# Patient Record
Sex: FEMALE | Race: WHITE | NOT HISPANIC OR LATINO | Employment: UNEMPLOYED | URBAN - METROPOLITAN AREA
[De-identification: names, ages, dates, MRNs, and addresses within clinical notes are randomized per-mention and may not be internally consistent; named-entity substitution may affect disease eponyms.]

---

## 2017-04-21 ENCOUNTER — APPOINTMENT (EMERGENCY)
Dept: RADIOLOGY | Facility: HOSPITAL | Age: 37
End: 2017-04-21
Payer: COMMERCIAL

## 2017-04-21 ENCOUNTER — APPOINTMENT (EMERGENCY)
Dept: CT IMAGING | Facility: HOSPITAL | Age: 37
End: 2017-04-21
Payer: COMMERCIAL

## 2017-04-21 ENCOUNTER — HOSPITAL ENCOUNTER (OUTPATIENT)
Facility: HOSPITAL | Age: 37
Setting detail: OBSERVATION
Discharge: HOME | End: 2017-04-22
Attending: INTERNAL MEDICINE | Admitting: INTERNAL MEDICINE
Payer: COMMERCIAL

## 2017-04-21 DIAGNOSIS — J45.909 ASTHMA: ICD-10-CM

## 2017-04-21 DIAGNOSIS — R10.9 ABDOMINAL PAIN: ICD-10-CM

## 2017-04-21 DIAGNOSIS — R07.89 ATYPICAL CHEST PAIN: ICD-10-CM

## 2017-04-21 DIAGNOSIS — I10 HYPERTENSION: ICD-10-CM

## 2017-04-21 DIAGNOSIS — R11.2 INTRACTABLE NAUSEA AND VOMITING: Primary | ICD-10-CM

## 2017-04-21 PROBLEM — I16.0 HYPERTENSIVE URGENCY: Status: ACTIVE | Noted: 2017-04-21

## 2017-04-21 LAB
ALBUMIN SERPL BCP-MCNC: 4.2 G/DL (ref 3.5–5)
ALP SERPL-CCNC: 49 U/L (ref 46–116)
ALT SERPL W P-5'-P-CCNC: 31 U/L (ref 12–78)
ANION GAP SERPL CALCULATED.3IONS-SCNC: 10 MMOL/L (ref 4–13)
AST SERPL W P-5'-P-CCNC: 20 U/L (ref 5–45)
ATRIAL RATE: 60 BPM
BACTERIA UR QL AUTO: ABNORMAL /HPF
BASOPHILS # BLD AUTO: 0.03 THOUSANDS/ΜL (ref 0–0.1)
BASOPHILS NFR BLD AUTO: 0 % (ref 0–1)
BILIRUB SERPL-MCNC: 0.69 MG/DL (ref 0.2–1)
BILIRUB UR QL STRIP: NEGATIVE
BUN SERPL-MCNC: 17 MG/DL (ref 5–25)
CALCIUM SERPL-MCNC: 8.9 MG/DL (ref 8.3–10.1)
CHLORIDE SERPL-SCNC: 104 MMOL/L (ref 100–108)
CLARITY UR: CLEAR
CO2 SERPL-SCNC: 27 MMOL/L (ref 21–32)
COLOR UR: YELLOW
CREAT SERPL-MCNC: 0.87 MG/DL (ref 0.6–1.3)
EOSINOPHIL # BLD AUTO: 0.07 THOUSAND/ΜL (ref 0–0.61)
EOSINOPHIL NFR BLD AUTO: 1 % (ref 0–6)
ERYTHROCYTE [DISTWIDTH] IN BLOOD BY AUTOMATED COUNT: 12.5 % (ref 11.6–15.1)
GFR SERPL CREATININE-BSD FRML MDRD: >60 ML/MIN/1.73SQ M
GLUCOSE SERPL-MCNC: 105 MG/DL (ref 65–140)
GLUCOSE UR STRIP-MCNC: NEGATIVE MG/DL
HCG UR QL: NEGATIVE
HCT VFR BLD AUTO: 42.2 % (ref 34.8–46.1)
HGB BLD-MCNC: 15 G/DL (ref 11.5–15.4)
HGB UR QL STRIP.AUTO: ABNORMAL
KETONES UR STRIP-MCNC: NEGATIVE MG/DL
LEUKOCYTE ESTERASE UR QL STRIP: NEGATIVE
LIPASE SERPL-CCNC: 295 U/L (ref 73–393)
LYMPHOCYTES # BLD AUTO: 3.33 THOUSANDS/ΜL (ref 0.6–4.47)
LYMPHOCYTES NFR BLD AUTO: 32 % (ref 14–44)
MCH RBC QN AUTO: 30.2 PG (ref 26.8–34.3)
MCHC RBC AUTO-ENTMCNC: 35.5 G/DL (ref 31.4–37.4)
MCV RBC AUTO: 85 FL (ref 82–98)
MONOCYTES # BLD AUTO: 0.45 THOUSAND/ΜL (ref 0.17–1.22)
MONOCYTES NFR BLD AUTO: 4 % (ref 4–12)
NEUTROPHILS # BLD AUTO: 6.63 THOUSANDS/ΜL (ref 1.85–7.62)
NEUTS SEG NFR BLD AUTO: 63 % (ref 43–75)
NITRITE UR QL STRIP: NEGATIVE
NON-SQ EPI CELLS URNS QL MICRO: ABNORMAL /HPF
P AXIS: 85 DEGREES
PH UR STRIP.AUTO: 7 [PH] (ref 4.5–8)
PLATELET # BLD AUTO: 261 THOUSANDS/UL (ref 149–390)
PMV BLD AUTO: 11.2 FL (ref 8.9–12.7)
POTASSIUM SERPL-SCNC: 3.5 MMOL/L (ref 3.5–5.3)
PR INTERVAL: 144 MS
PROT SERPL-MCNC: 7.5 G/DL (ref 6.4–8.2)
PROT UR STRIP-MCNC: NEGATIVE MG/DL
QRS AXIS: 77 DEGREES
QRSD INTERVAL: 74 MS
QT INTERVAL: 418 MS
QTC INTERVAL: 418 MS
RBC # BLD AUTO: 4.97 MILLION/UL (ref 3.81–5.12)
RBC #/AREA URNS AUTO: ABNORMAL /HPF
SODIUM SERPL-SCNC: 141 MMOL/L (ref 136–145)
SP GR UR STRIP.AUTO: 1.01 (ref 1–1.03)
SPECIMEN SOURCE: NORMAL
SPECIMEN SOURCE: NORMAL
T WAVE AXIS: 65 DEGREES
TROPONIN I BLD-MCNC: 0 NG/ML (ref 0–0.08)
TROPONIN I BLD-MCNC: 0 NG/ML (ref 0–0.08)
TSH SERPL DL<=0.05 MIU/L-ACNC: 1.36 UIU/ML (ref 0.36–3.74)
UROBILINOGEN UR QL STRIP.AUTO: 0.2 E.U./DL
VENTRICULAR RATE: 60 BPM
WBC # BLD AUTO: 10.51 THOUSAND/UL (ref 4.31–10.16)
WBC #/AREA URNS AUTO: ABNORMAL /HPF

## 2017-04-21 PROCEDURE — 96361 HYDRATE IV INFUSION ADD-ON: CPT

## 2017-04-21 PROCEDURE — 80053 COMPREHEN METABOLIC PANEL: CPT | Performed by: PHYSICIAN ASSISTANT

## 2017-04-21 PROCEDURE — 81025 URINE PREGNANCY TEST: CPT | Performed by: PHYSICIAN ASSISTANT

## 2017-04-21 PROCEDURE — 84443 ASSAY THYROID STIM HORMONE: CPT | Performed by: PHYSICIAN ASSISTANT

## 2017-04-21 PROCEDURE — 96376 TX/PRO/DX INJ SAME DRUG ADON: CPT

## 2017-04-21 PROCEDURE — 99285 EMERGENCY DEPT VISIT HI MDM: CPT

## 2017-04-21 PROCEDURE — 81002 URINALYSIS NONAUTO W/O SCOPE: CPT | Performed by: PHYSICIAN ASSISTANT

## 2017-04-21 PROCEDURE — 93005 ELECTROCARDIOGRAM TRACING: CPT | Performed by: PHYSICIAN ASSISTANT

## 2017-04-21 PROCEDURE — 83690 ASSAY OF LIPASE: CPT | Performed by: PHYSICIAN ASSISTANT

## 2017-04-21 PROCEDURE — 96375 TX/PRO/DX INJ NEW DRUG ADDON: CPT

## 2017-04-21 PROCEDURE — 36415 COLL VENOUS BLD VENIPUNCTURE: CPT | Performed by: PHYSICIAN ASSISTANT

## 2017-04-21 PROCEDURE — 85025 COMPLETE CBC W/AUTO DIFF WBC: CPT | Performed by: PHYSICIAN ASSISTANT

## 2017-04-21 PROCEDURE — 84484 ASSAY OF TROPONIN QUANT: CPT

## 2017-04-21 PROCEDURE — 74177 CT ABD & PELVIS W/CONTRAST: CPT

## 2017-04-21 PROCEDURE — 71020 HB CHEST X-RAY 2VW FRONTAL&LATL: CPT

## 2017-04-21 PROCEDURE — 96374 THER/PROPH/DIAG INJ IV PUSH: CPT

## 2017-04-21 PROCEDURE — 87086 URINE CULTURE/COLONY COUNT: CPT

## 2017-04-21 PROCEDURE — 81001 URINALYSIS AUTO W/SCOPE: CPT

## 2017-04-21 PROCEDURE — 94640 AIRWAY INHALATION TREATMENT: CPT

## 2017-04-21 RX ORDER — IPRATROPIUM BROMIDE AND ALBUTEROL SULFATE 2.5; .5 MG/3ML; MG/3ML
3 SOLUTION RESPIRATORY (INHALATION)
Status: DISCONTINUED | OUTPATIENT
Start: 2017-04-21 | End: 2017-04-21

## 2017-04-21 RX ORDER — NICOTINE 21 MG/24HR
1 PATCH, TRANSDERMAL 24 HOURS TRANSDERMAL DAILY
Status: DISCONTINUED | OUTPATIENT
Start: 2017-04-22 | End: 2017-04-22 | Stop reason: HOSPADM

## 2017-04-21 RX ORDER — MAGNESIUM HYDROXIDE/ALUMINUM HYDROXICE/SIMETHICONE 120; 1200; 1200 MG/30ML; MG/30ML; MG/30ML
30 SUSPENSION ORAL ONCE
Status: COMPLETED | OUTPATIENT
Start: 2017-04-21 | End: 2017-04-21

## 2017-04-21 RX ORDER — ONDANSETRON 2 MG/ML
4 INJECTION INTRAMUSCULAR; INTRAVENOUS ONCE
Status: COMPLETED | OUTPATIENT
Start: 2017-04-21 | End: 2017-04-21

## 2017-04-21 RX ORDER — MORPHINE SULFATE 2 MG/ML
2 INJECTION, SOLUTION INTRAMUSCULAR; INTRAVENOUS ONCE
Status: COMPLETED | OUTPATIENT
Start: 2017-04-21 | End: 2017-04-21

## 2017-04-21 RX ORDER — ONDANSETRON 2 MG/ML
INJECTION INTRAMUSCULAR; INTRAVENOUS
Status: COMPLETED
Start: 2017-04-21 | End: 2017-04-21

## 2017-04-21 RX ORDER — ONDANSETRON 2 MG/ML
4 INJECTION INTRAMUSCULAR; INTRAVENOUS EVERY 6 HOURS PRN
Status: DISCONTINUED | OUTPATIENT
Start: 2017-04-21 | End: 2017-04-22 | Stop reason: HOSPADM

## 2017-04-21 RX ORDER — CALCIUM CARBONATE 200(500)MG
1000 TABLET,CHEWABLE ORAL DAILY PRN
Status: DISCONTINUED | OUTPATIENT
Start: 2017-04-21 | End: 2017-04-22 | Stop reason: HOSPADM

## 2017-04-21 RX ORDER — ACETAMINOPHEN 325 MG/1
650 TABLET ORAL EVERY 6 HOURS PRN
Status: DISCONTINUED | OUTPATIENT
Start: 2017-04-21 | End: 2017-04-22 | Stop reason: HOSPADM

## 2017-04-21 RX ORDER — ALBUTEROL SULFATE 90 UG/1
2 AEROSOL, METERED RESPIRATORY (INHALATION) EVERY 4 HOURS PRN
Status: DISCONTINUED | OUTPATIENT
Start: 2017-04-21 | End: 2017-04-22 | Stop reason: HOSPADM

## 2017-04-21 RX ORDER — PANTOPRAZOLE SODIUM 40 MG/1
40 TABLET, DELAYED RELEASE ORAL
Status: DISCONTINUED | OUTPATIENT
Start: 2017-04-21 | End: 2017-04-22 | Stop reason: HOSPADM

## 2017-04-21 RX ORDER — HYDRALAZINE HYDROCHLORIDE 20 MG/ML
10 INJECTION INTRAMUSCULAR; INTRAVENOUS EVERY 6 HOURS PRN
Status: DISCONTINUED | OUTPATIENT
Start: 2017-04-21 | End: 2017-04-22 | Stop reason: HOSPADM

## 2017-04-21 RX ORDER — MAGNESIUM HYDROXIDE/ALUMINUM HYDROXICE/SIMETHICONE 120; 1200; 1200 MG/30ML; MG/30ML; MG/30ML
30 SUSPENSION ORAL EVERY 6 HOURS PRN
Status: DISCONTINUED | OUTPATIENT
Start: 2017-04-21 | End: 2017-04-22 | Stop reason: HOSPADM

## 2017-04-21 RX ORDER — LISINOPRIL 10 MG/1
10 TABLET ORAL DAILY
Status: DISCONTINUED | OUTPATIENT
Start: 2017-04-21 | End: 2017-04-22 | Stop reason: HOSPADM

## 2017-04-21 RX ORDER — SODIUM CHLORIDE 9 MG/ML
125 INJECTION, SOLUTION INTRAVENOUS CONTINUOUS
Status: DISCONTINUED | OUTPATIENT
Start: 2017-04-21 | End: 2017-04-22 | Stop reason: HOSPADM

## 2017-04-21 RX ORDER — SUCRALFATE ORAL 1 G/10ML
1000 SUSPENSION ORAL
Status: DISCONTINUED | OUTPATIENT
Start: 2017-04-21 | End: 2017-04-22 | Stop reason: HOSPADM

## 2017-04-21 RX ORDER — METOCLOPRAMIDE HYDROCHLORIDE 5 MG/ML
10 INJECTION INTRAMUSCULAR; INTRAVENOUS EVERY 6 HOURS PRN
Status: DISCONTINUED | OUTPATIENT
Start: 2017-04-21 | End: 2017-04-22 | Stop reason: HOSPADM

## 2017-04-21 RX ADMIN — ONDANSETRON 4 MG: 2 INJECTION INTRAMUSCULAR; INTRAVENOUS at 11:23

## 2017-04-21 RX ADMIN — Medication 1000 MG: at 15:51

## 2017-04-21 RX ADMIN — SUCRALFATE 1000 MG: 1 SUSPENSION ORAL at 21:01

## 2017-04-21 RX ADMIN — SUCRALFATE 1000 MG: 1 SUSPENSION ORAL at 15:50

## 2017-04-21 RX ADMIN — SODIUM CHLORIDE 125 ML/HR: 0.9 INJECTION, SOLUTION INTRAVENOUS at 14:03

## 2017-04-21 RX ADMIN — LISINOPRIL 10 MG: 5 TABLET ORAL at 15:49

## 2017-04-21 RX ADMIN — ONDANSETRON 4 MG: 2 INJECTION INTRAMUSCULAR; INTRAVENOUS at 09:36

## 2017-04-21 RX ADMIN — IOHEXOL 100 ML: 350 INJECTION, SOLUTION INTRAVENOUS at 10:46

## 2017-04-21 RX ADMIN — ALUMINUM HYDROXIDE, MAGNESIUM HYDROXIDE, AND SIMETHICONE 30 ML: 200; 200; 20 SUSPENSION ORAL at 13:41

## 2017-04-21 RX ADMIN — MORPHINE SULFATE 2 MG: 2 INJECTION, SOLUTION INTRAMUSCULAR; INTRAVENOUS at 11:24

## 2017-04-21 RX ADMIN — SODIUM CHLORIDE 1000 ML: 0.9 INJECTION, SOLUTION INTRAVENOUS at 09:35

## 2017-04-21 RX ADMIN — IPRATROPIUM BROMIDE AND ALBUTEROL SULFATE 3 ML: .5; 3 SOLUTION RESPIRATORY (INHALATION) at 11:21

## 2017-04-21 RX ADMIN — PANTOPRAZOLE SODIUM 40 MG: 40 TABLET, DELAYED RELEASE ORAL at 15:48

## 2017-04-22 VITALS
RESPIRATION RATE: 18 BRPM | HEIGHT: 63 IN | DIASTOLIC BLOOD PRESSURE: 94 MMHG | BODY MASS INDEX: 16.72 KG/M2 | SYSTOLIC BLOOD PRESSURE: 130 MMHG | WEIGHT: 94.36 LBS | OXYGEN SATURATION: 98 % | HEART RATE: 54 BPM | TEMPERATURE: 98.1 F

## 2017-04-22 PROBLEM — I16.0 HYPERTENSIVE URGENCY: Status: RESOLVED | Noted: 2017-04-21 | Resolved: 2017-04-22

## 2017-04-22 LAB
ANION GAP SERPL CALCULATED.3IONS-SCNC: 8 MMOL/L (ref 4–13)
BACTERIA UR CULT: NORMAL
BUN SERPL-MCNC: 10 MG/DL (ref 5–25)
CALCIUM SERPL-MCNC: 8.1 MG/DL (ref 8.3–10.1)
CHLORIDE SERPL-SCNC: 108 MMOL/L (ref 100–108)
CO2 SERPL-SCNC: 26 MMOL/L (ref 21–32)
CREAT SERPL-MCNC: 0.81 MG/DL (ref 0.6–1.3)
ERYTHROCYTE [DISTWIDTH] IN BLOOD BY AUTOMATED COUNT: 12.7 % (ref 11.6–15.1)
GFR SERPL CREATININE-BSD FRML MDRD: >60 ML/MIN/1.73SQ M
GLUCOSE SERPL-MCNC: 76 MG/DL (ref 65–140)
HCT VFR BLD AUTO: 42.9 % (ref 34.8–46.1)
HGB BLD-MCNC: 14.9 G/DL (ref 11.5–15.4)
MCH RBC QN AUTO: 30.3 PG (ref 26.8–34.3)
MCHC RBC AUTO-ENTMCNC: 34.7 G/DL (ref 31.4–37.4)
MCV RBC AUTO: 87 FL (ref 82–98)
PLATELET # BLD AUTO: 235 THOUSANDS/UL (ref 149–390)
PMV BLD AUTO: 11.3 FL (ref 8.9–12.7)
POTASSIUM SERPL-SCNC: 3.5 MMOL/L (ref 3.5–5.3)
RBC # BLD AUTO: 4.92 MILLION/UL (ref 3.81–5.12)
SODIUM SERPL-SCNC: 142 MMOL/L (ref 136–145)
WBC # BLD AUTO: 9.98 THOUSAND/UL (ref 4.31–10.16)

## 2017-04-22 PROCEDURE — 80307 DRUG TEST PRSMV CHEM ANLYZR: CPT | Performed by: INTERNAL MEDICINE

## 2017-04-22 PROCEDURE — 94640 AIRWAY INHALATION TREATMENT: CPT

## 2017-04-22 PROCEDURE — 94760 N-INVAS EAR/PLS OXIMETRY 1: CPT

## 2017-04-22 PROCEDURE — 85027 COMPLETE CBC AUTOMATED: CPT | Performed by: PHYSICIAN ASSISTANT

## 2017-04-22 PROCEDURE — 80048 BASIC METABOLIC PNL TOTAL CA: CPT | Performed by: PHYSICIAN ASSISTANT

## 2017-04-22 RX ORDER — LISINOPRIL 10 MG/1
10 TABLET ORAL DAILY
Qty: 30 TABLET | Refills: 0 | Status: SHIPPED | OUTPATIENT
Start: 2017-04-22 | End: 2017-04-22 | Stop reason: HOSPADM

## 2017-04-22 RX ORDER — SODIUM CHLORIDE FOR INHALATION 0.9 %
3 VIAL, NEBULIZER (ML) INHALATION EVERY 6 HOURS PRN
Status: DISCONTINUED | OUTPATIENT
Start: 2017-04-22 | End: 2017-04-22 | Stop reason: HOSPADM

## 2017-04-22 RX ORDER — LEVALBUTEROL 1.25 MG/.5ML
1.25 SOLUTION, CONCENTRATE RESPIRATORY (INHALATION) EVERY 6 HOURS PRN
Status: DISCONTINUED | OUTPATIENT
Start: 2017-04-22 | End: 2017-04-22 | Stop reason: CLARIF

## 2017-04-22 RX ORDER — HYDROXYZINE HYDROCHLORIDE 25 MG/1
25 TABLET, FILM COATED ORAL EVERY 6 HOURS PRN
Status: DISCONTINUED | OUTPATIENT
Start: 2017-04-22 | End: 2017-04-22 | Stop reason: HOSPADM

## 2017-04-22 RX ORDER — ALBUTEROL SULFATE 90 UG/1
2 AEROSOL, METERED RESPIRATORY (INHALATION) EVERY 4 HOURS PRN
Qty: 1 INHALER | Refills: 2 | Status: SHIPPED | OUTPATIENT
Start: 2017-04-22 | End: 2017-05-22

## 2017-04-22 RX ORDER — LEVALBUTEROL 1.25 MG/.5ML
1.25 SOLUTION, CONCENTRATE RESPIRATORY (INHALATION) EVERY 6 HOURS PRN
Status: DISCONTINUED | OUTPATIENT
Start: 2017-04-22 | End: 2017-04-22 | Stop reason: HOSPADM

## 2017-04-22 RX ADMIN — SUCRALFATE 1000 MG: 1 SUSPENSION ORAL at 11:47

## 2017-04-22 RX ADMIN — SODIUM CHLORIDE 125 ML/HR: 0.9 INJECTION, SOLUTION INTRAVENOUS at 01:30

## 2017-04-22 RX ADMIN — PANTOPRAZOLE SODIUM 40 MG: 40 TABLET, DELAYED RELEASE ORAL at 05:42

## 2017-04-22 RX ADMIN — ALUMINUM HYDROXIDE, MAGNESIUM HYDROXIDE, AND SIMETHICONE 30 ML: 200; 200; 20 SUSPENSION ORAL at 10:15

## 2017-04-22 RX ADMIN — LEVALBUTEROL 1.25 MG: 1.25 SOLUTION, CONCENTRATE RESPIRATORY (INHALATION) at 12:01

## 2017-04-22 RX ADMIN — METOCLOPRAMIDE 10 MG: 5 INJECTION, SOLUTION INTRAMUSCULAR; INTRAVENOUS at 11:48

## 2017-04-22 RX ADMIN — LISINOPRIL 10 MG: 5 TABLET ORAL at 10:16

## 2017-04-22 RX ADMIN — ISODIUM CHLORIDE 3 ML: 0.03 SOLUTION RESPIRATORY (INHALATION) at 12:01

## 2017-04-27 LAB
AMPHETAMINES UR QL SCN: NEGATIVE NG/ML
BARBITURATES UR QL SCN: NEGATIVE NG/ML
BENZODIAZ UR QL SCN: NEGATIVE NG/ML
BZE UR QL SCN: NEGATIVE NG/ML
CANNABINOIDS UR QL SCN: POSITIVE
METHADONE UR QL SCN: NEGATIVE NG/ML
OPIATES UR QL: NEGATIVE NG/ML
PCP UR QL: NEGATIVE NG/ML
PROPOXYPH UR QL: NEGATIVE NG/ML

## 2017-05-30 ENCOUNTER — ALLSCRIPTS OFFICE VISIT (OUTPATIENT)
Dept: OTHER | Facility: OTHER | Age: 37
End: 2017-05-30

## 2017-08-01 ENCOUNTER — GENERIC CONVERSION - ENCOUNTER (OUTPATIENT)
Dept: OTHER | Facility: OTHER | Age: 37
End: 2017-08-01

## 2017-09-08 ENCOUNTER — HOSPITAL ENCOUNTER (EMERGENCY)
Facility: HOSPITAL | Age: 37
Discharge: HOME | End: 2017-09-08
Attending: EMERGENCY MEDICINE | Admitting: EMERGENCY MEDICINE
Payer: COMMERCIAL

## 2017-09-08 VITALS
OXYGEN SATURATION: 98 % | HEART RATE: 75 BPM | DIASTOLIC BLOOD PRESSURE: 82 MMHG | WEIGHT: 112 LBS | RESPIRATION RATE: 16 BRPM | TEMPERATURE: 98.2 F | SYSTOLIC BLOOD PRESSURE: 122 MMHG | BODY MASS INDEX: 19.84 KG/M2

## 2017-09-08 DIAGNOSIS — E03.9 HYPOTHYROIDISM: ICD-10-CM

## 2017-09-08 DIAGNOSIS — F41.9 ANXIETY: ICD-10-CM

## 2017-09-08 DIAGNOSIS — Z32.00 ENCOUNTER FOR PREGNANCY TEST: ICD-10-CM

## 2017-09-08 DIAGNOSIS — N92.6 ABNORMAL MENSES: Primary | ICD-10-CM

## 2017-09-08 LAB — HCG UR QL: NEGATIVE

## 2017-09-08 PROCEDURE — 99284 EMERGENCY DEPT VISIT MOD MDM: CPT

## 2017-09-08 PROCEDURE — 81025 URINE PREGNANCY TEST: CPT | Performed by: EMERGENCY MEDICINE

## 2017-09-19 ENCOUNTER — ALLSCRIPTS OFFICE VISIT (OUTPATIENT)
Dept: OTHER | Facility: OTHER | Age: 37
End: 2017-09-19

## 2017-09-19 DIAGNOSIS — E03.9 HYPOTHYROIDISM: ICD-10-CM

## 2017-09-19 DIAGNOSIS — Z86.79 PERSONAL HISTORY OF OTHER DISEASES OF THE CIRCULATORY SYSTEM: ICD-10-CM

## 2017-09-19 DIAGNOSIS — E87.6 HYPOKALEMIA: ICD-10-CM

## 2017-10-30 ENCOUNTER — GENERIC CONVERSION - ENCOUNTER (OUTPATIENT)
Dept: OTHER | Facility: OTHER | Age: 37
End: 2017-10-30

## 2018-01-02 ENCOUNTER — GENERIC CONVERSION - ENCOUNTER (OUTPATIENT)
Dept: OTHER | Facility: OTHER | Age: 38
End: 2018-01-02

## 2018-01-10 NOTE — PROGRESS NOTES
Assessment    1  Well adult on routine health check (V70 0) (Z00 00)   2  Nicotine dependence (305 1) (F17 200)   3  Cervical cancer screening (V76 2) (Z12 4)    Plan  Nicotine dependence    · Nicotine 14 MG/24HR Transdermal Patch 24 Hour; APPLY 1 PATCH DAILY AS  DIRECTED   · Nicotine 21 MG/24HR Transdermal Patch 24 Hour; APPLY 1 PATCH DAILY AS  DIRECTED   · Nicotine 7 MG/24HR Transdermal Patch 24 Hour; APPLY 1 PATCH DAILY AS  DIRECTED  PMH: History of hypertension, Hypokalemia, Hypothyroidism    · (1) CBC/PLT/DIFF; Status:Active; Requested for:99Nse8990;    · (1) COMPREHENSIVE METABOLIC PANEL; Status:Active; Requested for:35Dhl0300;    · (1) LIPID PANEL, FASTING; Status:Active; Requested for:98Prf2800;    · (1) MICROALBUMIN CREATININE RATIO, RANDOM URINE; Status:Active; Requested  for:92Lld5938;     Discussion/Summary  health maintenance visit Currently, she eats an adequate diet  She will sign a release form to get her recent Pap smear done at [facility]  Advice and education were given regarding weight bearing exercise, tobacco cessation and sHe states that she does plan on joining a gym soon  Patient discussion: discussed with the patient  She will proceed with the smoking cessation patches starting with the 21 mg every 24 hours for 6 weeks then decrease to the 14 mg patch for 6 weeks and then lastly the 7 mg patch  Her 's license form has been completed and photocopied by myself  She will drop off her work form when she finds at home  She's unsure of what was needed with regards to titers on the form  She will proceed with blood work and make a follow-up appointment for her chronic medical conditions  Chief Complaint  Physical, has 's permit form to be completed  History of Present Illness  HM, Adult Female: The patient is being seen for a health maintenance evaluation  General Health: The patient's health since the last visit is described as good   She has regular dental visits  The patient hasn't had a dental visit  She complains of vision problems  She denies hearing loss  Lifestyle:  She consumes a diverse and healthy diet  She does not have any weight concerns  She does not exercise regularly  She uses tobacco  She denies alcohol use  She denies drug use  Reproductive health:  has appt with chyna  [facility]  Screening:   HPI: Patient states that she would also like the patches to quit smoking  She smokes close to one pack per day  She has had success with the patches in the past   The medical conditions on the 's license form has been reviewed and patient denies any of these conditions at this time  She states that she is temporarily coming from a family practice in [place]  When she gets insurance through her employer she would like to transfer to a provider with [facility] which she prefers  She states that overall she has never been too happy with the [facility]  Review of Systems    Constitutional: No fever, no chills, feels well, no tiredness, no recent weight gain or weight loss  Eyes: eyesight problems  ENT: no complaints of earache, no loss of hearing, no nose bleeds, no nasal discharge, no sore throat, no hoarseness  Cardiovascular: No complaints of slow heart rate, no fast heart rate, no chest pain, no palpitations, no leg claudication, no lower extremity edema  Respiratory: No complaints of shortness of breath, no wheezing, no cough, no SOB on exertion, no orthopnea, no PND  Gastrointestinal: No complaints of abdominal pain, no constipation, no nausea or vomiting, no diarrhea, no bloody stools  Active Problems    1  Acute severe exacerbation of asthma (493 92) (J45 901)   2  Acute upper respiratory infection (465 9) (J06 9)   3  Anxiety disorder (300 00) (F41 9)   4  Asthma (493 90) (J45 909)   5  Cervical cancer screening (V76 2) (Z12 4)   6   Chronic obstructive pulmonary disease (496) (J44 9)   7  Contraceptive management (V25 9) (Z30 9)   8  Depression screening (V79 0) (Z13 89)   9  Encounter for cervical Pap smear with pelvic exam (V76 2,V72 31) (Z01 419)   10  Encounter for 's license history and physical (V70 3) (Z02 4)   11  Hemorrhoids (455 6) (K64 9)   12  History of hypertension (V12 59) (Z86 79)   13  Hypokalemia (276 8) (E87 6)   14  Hypothyroidism (244 9) (E03 9)   15  Migraine headache (346 90) (G43 909)   16  Mitral regurgitation (424 0) (I34 0)   17  Nicotine dependence (305 1) (F17 200)   18  Polyuria (788 42) (R35 8)   19  Spontaneous , complete, without mention of complication (155 52) (D10 3)    Past Medical History    · History of hypertension (V12 59) (Z86 79)    Surgical History    · History of Appendectomy   · Contraceptive management (V25 9) (Z30 9)   · History of Exploratory Laparoscopy   · History of Splenectomy    Family History  Mother    · Family history of COPD (chronic obstructive pulmonary disease)  Brother    · Family history of Asthma   · Family history of COPD (chronic obstructive pulmonary disease)  Maternal Grandmother    · Family history of Asthma   · Family history of COPD (chronic obstructive pulmonary disease)   · Family history of Lung cancer  Maternal Grandfather    · Family history of multiple sclerosis (V17 2) (Z82 0)  Family History    · Family history of diabetes mellitus (V18 0) (Z83 3)   · Family history of multiple sclerosis (V17 2) (Z82 0)    Social History    · Denied: History of Alcohol use   · Current Every Day Smoker (305 1)   · Denied: History of Drug use   · No preference on Rastafarian beliefs    Current Meds   1  Albuterol Sulfate (2 5 MG/3ML) 0 083% Inhalation Nebulization Solution; Therapy: 94Mmd5641 to Recorded   2  [place] Thyroid 60 MG Oral Tablet; Therapy: 05OJV2723 to Recorded   3  Flovent  MCG/ACT Inhalation Aerosol; INHALE 1 PUFF TWICE DAILY;    Therapy: 25Apj2571 to (Last Kendal Kaur)  Requested for: 96Sna3404 Ordered   4  Ibuprofen 600 MG Oral Tablet; take 1 tablet every 6-8 hours as needed for pain  take   with food; Therapy: 66RSE1191 to (Evaluate:10Aug2015)  Requested for: 43NAR2099; Last   Rx:11Jun2015 Ordered   5  Magnesium 250 MG Oral Tablet; take one tablet daily; Therapy: 62TOG2702 to (Evaluate:30Mar2016)  Requested for: 15DVR0262; Last   Rx:16Mar2016 Ordered   6  Naproxen Sodium 550 MG Oral Tablet; TAKE 1 TABLET EVERY 12 HOURS AS   NEEDED; Therapy: 18TEL4235 to (Evaluate:15Apr2016)  Requested for: 24BBE9928; Last   Rx:16Mar2016 Ordered   7  Ondansetron HCl - 4 MG Oral Tablet; TAKE 1 TABLET EVERY 8 HOURS AS NEEDED   FOR NAUSEA; Therapy: 74EDL2161 to (Evaluate:21Mar2016)  Requested for: 53UKW6183; Last   Rx:16Mar2016 Ordered   8  PredniSONE 10 MG Oral Tablet; TAKE 4 TABLETS DAILY FOR 2 DAYS,3 TABLETS   DAILY FOR 2 DAYS, 2 TABLETS DAILY FOR 2 DAYS AND 1 TABLET DAILY FOR   2 DAYS, THEN STOP; Therapy: 04HTY8216 to (Last Rx:18Feb2016)  Requested for: 16RCT1866 Ordered   9  Symbicort 80-4 5 MCG/ACT Inhalation Aerosol; Therapy: (Recorded:19Sep2017) to Recorded   10  Ventolin  (90 Base) MCG/ACT Inhalation Aerosol Solution; INHALE 1 TO 2    PUFFS EVERY 4 TO 6 HOURS AS NEEDED; Therapy: 53LTN1606 to (Evaluate:11Mar2017)  Requested for: 76QTF1218; Last    Rx:16Mar2016 Ordered    Allergies    1  Ativan TABS   2  Penicillins   3  TEGretol TABS    Vitals   Recorded: 19Sep2017 04:55PM   Temperature 98 F, Tympanic   Heart Rate 92   Respiration 18   Systolic 970   Diastolic 70   Height 5 ft 3 5 in   Weight 110 lb    BMI Calculated 19 18   BSA Calculated 1 5   O2 Saturation 97   LMP 01-Sep-2017   Pain Scale 0     Physical Exam    Constitutional   General appearance: No acute distress, well appearing and well nourished      Ears, Nose, Mouth, and Throat   External inspection of ears and nose: Normal     Otoscopic examination: Tympanic membranes translucent with normal light reflex  Canals patent without erythema  Lips, teeth, and gums: Normal, good dentition  Oropharynx: Normal with no erythema, edema, exudate or lesions  Neck   Neck: Supple, symmetric, trachea midline, no masses  Thyroid: Normal, no thyromegaly  Pulmonary   Respiratory effort: No increased work of breathing or signs of respiratory distress  Auscultation of lungs: Clear to auscultation  Cardiovascular   Auscultation of heart: Normal rate and rhythm, normal S1 and S2, no murmurs  Abdomen   Abdomen: Non-tender, no masses  Liver and spleen: No hepatomegaly or splenomegaly  Lymphatic   Palpation of lymph nodes in neck: No lymphadenopathy      Musculoskeletal   Gait and station: Normal     Joints, bones, and muscles: Normal     Range of motion: Normal        Signatures   Electronically signed by : JULIA Fermin; Sep 19 2017  5:33PM EST                       (Author)    Electronically signed by : MEG Bruce ; Sep 20 2017  1:06PM EST

## 2018-01-13 VITALS
TEMPERATURE: 98 F | BODY MASS INDEX: 18.78 KG/M2 | RESPIRATION RATE: 18 BRPM | WEIGHT: 110 LBS | HEART RATE: 92 BPM | OXYGEN SATURATION: 97 % | HEIGHT: 64 IN | DIASTOLIC BLOOD PRESSURE: 70 MMHG | SYSTOLIC BLOOD PRESSURE: 100 MMHG

## 2018-01-15 NOTE — MISCELLANEOUS
Provider Comments  Provider Comments:   Patient is a no-show for her 9:00 appointment today      Signatures   Electronically signed by : Paola Lucas, [facility]; May 30 2017  9:24AM EST                       (Author)

## 2018-01-17 NOTE — MISCELLANEOUS
Dear Cruz Sites:    Cl Man have had 2 no-show appointments at our office (5/30/17,7/25/17)  A no-show  is defined as any patient who fails to arrive for a scheduled appointment without canceling the appointment prior to the scheduled time  A patient who has more than 3 no-shows will be dismissed from an [address] [place] [address] practice  Please call  24 hours in advance to cancel appointments  If you continue to no-show for scheduled appointments, you will be dismissed from our practice        Respectfully,

## 2018-01-22 VITALS
SYSTOLIC BLOOD PRESSURE: 100 MMHG | HEIGHT: 64 IN | HEART RATE: 88 BPM | BODY MASS INDEX: 19.12 KG/M2 | TEMPERATURE: 97.1 F | WEIGHT: 112 LBS | OXYGEN SATURATION: 98 % | DIASTOLIC BLOOD PRESSURE: 68 MMHG | RESPIRATION RATE: 18 BRPM

## 2018-01-23 NOTE — MISCELLANEOUS
Please contact our office at your convenience  It is important that we speak to you  REGARDING:   Porfavor contacte a  nuestra oficina  Es muy importante que hablemos con usted   SOBRE:         X Scheduling an  Appointment/ Programar césar Teena          Rescheduling an Appointment/ Re-programar césar Teena     Cancelling an Appointment/Cancelar loyd Teena     Your Lab/ X-ray Results/Resultados          Updating your Phone number or Address/ Actualizar loyd [employer]           Verify your Primary Providor/ Verificar loyd Proveedor primario     Other/Otro:     Please Contact Our Office to Schedule Your Appointment  It is Very Important That You See Your Provider for your  Routine Medical Care  If you are seeing a New Providor,  Please Contact our Office so we can update our Records  Thank You  Comuníquese con nuestra oficina para programar loyd  teena  Es Muy Importante que usted ryland loyd proveedor para loyd   [address] [facility] Floor de [place]  Si usted esta viendo un Proveedor  [place], [place] con Bedolla Kenny oficina para  actualizar   nuestro registros  Parth

## 2018-05-27 ENCOUNTER — HOSPITAL ENCOUNTER (EMERGENCY)
Facility: HOSPITAL | Age: 38
Discharge: HOME | End: 2018-05-27
Attending: EMERGENCY MEDICINE
Payer: COMMERCIAL

## 2018-05-27 VITALS
HEART RATE: 77 BPM | OXYGEN SATURATION: 100 % | DIASTOLIC BLOOD PRESSURE: 80 MMHG | BODY MASS INDEX: 20.4 KG/M2 | SYSTOLIC BLOOD PRESSURE: 117 MMHG | RESPIRATION RATE: 18 BRPM | TEMPERATURE: 97.7 F | WEIGHT: 117 LBS

## 2018-05-27 DIAGNOSIS — J45.901 ASTHMA EXACERBATION: Primary | ICD-10-CM

## 2018-05-27 DIAGNOSIS — R06.00 DYSPNEA: ICD-10-CM

## 2018-05-27 DIAGNOSIS — R06.2 WHEEZING: ICD-10-CM

## 2018-05-27 PROCEDURE — 99283 EMERGENCY DEPT VISIT LOW MDM: CPT

## 2018-05-27 PROCEDURE — 93005 ELECTROCARDIOGRAM TRACING: CPT

## 2018-05-27 PROCEDURE — 96375 TX/PRO/DX INJ NEW DRUG ADDON: CPT

## 2018-05-27 PROCEDURE — 96365 THER/PROPH/DIAG IV INF INIT: CPT

## 2018-05-27 PROCEDURE — 94644 CONT INHLJ TX 1ST HOUR: CPT

## 2018-05-27 RX ORDER — BUDESONIDE AND FORMOTEROL FUMARATE DIHYDRATE 160; 4.5 UG/1; UG/1
AEROSOL RESPIRATORY (INHALATION)
COMMUNITY
Start: 2015-09-11 | End: 2019-11-27

## 2018-05-27 RX ORDER — MAGNESIUM SULFATE HEPTAHYDRATE 40 MG/ML
2 INJECTION, SOLUTION INTRAVENOUS ONCE
Status: COMPLETED | OUTPATIENT
Start: 2018-05-27 | End: 2018-05-27

## 2018-05-27 RX ORDER — SODIUM CHLORIDE FOR INHALATION 0.9 %
3 VIAL, NEBULIZER (ML) INHALATION ONCE
Status: COMPLETED | OUTPATIENT
Start: 2018-05-27 | End: 2018-05-27

## 2018-05-27 RX ORDER — PREDNISONE 10 MG/1
60 TABLET ORAL DAILY
Qty: 30 TABLET | Refills: 0 | Status: SHIPPED | OUTPATIENT
Start: 2018-05-27 | End: 2018-06-01

## 2018-05-27 RX ORDER — LEVOTHYROXINE AND LIOTHYRONINE 38; 9 UG/1; UG/1
TABLET ORAL
COMMUNITY
Start: 2014-10-14 | End: 2019-11-27

## 2018-05-27 RX ORDER — FLUTICASONE PROPIONATE 220 UG/1
1 AEROSOL, METERED RESPIRATORY (INHALATION) ONCE
Status: COMPLETED | OUTPATIENT
Start: 2018-05-27 | End: 2018-05-27

## 2018-05-27 RX ORDER — ALBUTEROL SULFATE 2.5 MG/3ML
1 SOLUTION RESPIRATORY (INHALATION) EVERY 6 HOURS PRN
COMMUNITY
Start: 2014-09-17 | End: 2020-10-19

## 2018-05-27 RX ORDER — DEXAMETHASONE SODIUM PHOSPHATE 10 MG/ML
10 INJECTION, SOLUTION INTRAMUSCULAR; INTRAVENOUS ONCE
Status: COMPLETED | OUTPATIENT
Start: 2018-05-27 | End: 2018-05-27

## 2018-05-27 RX ORDER — IBUPROFEN 600 MG/1
TABLET ORAL
COMMUNITY
Start: 2015-06-11 | End: 2020-09-02 | Stop reason: CLARIF

## 2018-05-27 RX ORDER — ALBUTEROL SULFATE 90 UG/1
AEROSOL, METERED RESPIRATORY (INHALATION)
COMMUNITY
Start: 2015-07-01 | End: 2020-03-17 | Stop reason: SDUPTHER

## 2018-05-27 RX ADMIN — SODIUM CHLORIDE 1000 ML: 0.9 INJECTION, SOLUTION INTRAVENOUS at 13:10

## 2018-05-27 RX ADMIN — DEXAMETHASONE SODIUM PHOSPHATE 10 MG: 10 INJECTION, SOLUTION INTRAMUSCULAR; INTRAVENOUS at 13:13

## 2018-05-27 RX ADMIN — FLUTICASONE PROPIONATE 1 PUFF: 220 AEROSOL, METERED RESPIRATORY (INHALATION) at 14:42

## 2018-05-27 RX ADMIN — ISODIUM CHLORIDE 3 ML: 0.03 SOLUTION RESPIRATORY (INHALATION) at 13:01

## 2018-05-27 RX ADMIN — ALBUTEROL SULFATE 10 MG: 2.5 SOLUTION RESPIRATORY (INHALATION) at 13:01

## 2018-05-27 RX ADMIN — IPRATROPIUM BROMIDE 1 MG: 0.5 SOLUTION RESPIRATORY (INHALATION) at 13:01

## 2018-05-27 RX ADMIN — MAGNESIUM SULFATE HEPTAHYDRATE 2 G: 40 INJECTION, SOLUTION INTRAVENOUS at 13:23

## 2018-05-27 NOTE — ED NOTES
[place] Grate still going  Pt states some breathing improvement       Delores Brock, DESHAUN  05/27/18 9677

## 2018-05-27 NOTE — ED PROVIDER NOTES
Final Diagnosis:  1  Asthma exacerbation    2  Wheezing    3  Dyspnea      Chief Complaint   Patient presents with    Asthma     pt comes in with cough and  asthma exacerbation for about a week  pt has used inhaler and neb tx at home  pt states it was helping in the beginning but now is not helping  This is a 30year old female presenting for likely asthma exacerbation  She states that every year she would get asthma exacerbations especially with seasonal changes or very humid weather  It feels like she is having one  For the past 1 week she has been having gradually worsening SOB, chest tightness  It was not sudden in onset, just gradually worsening with each day  She ran out of her inhaled steroids x1 5 weeks ago and couldn't refill it because she can't afford the $35 copay  She's been using inhaler + nebulizer of albuterol with minimal improvement so came in today for evaluation  Denies f/ch/n/v  Denies arm pain jaw pain back pain  Denies diaphoresis  She is SOB and it is exertional, but feels like her asthma  She is continuing to work despite the symptoms  Denies sick contacts at home  She is a smoker and is continuing to smoke despite symptoms  PMH:  - COPD/asthma  - MV regurg  - hypothyroidism  PSH:  - ovarian cyst removal  +smoking daily  No alcohol/drugs/cocaine  No recent travel, long drives  PE:   Vitals:    05/27/18 1238 05/27/18 1355   BP: 162/84 123/71   BP Location: Right arm    Pulse: 91 63   Resp: 20 18   Temp: 97 7 °F (36 5 °C)    TempSrc: Oral    SpO2: 98% 100%   Weight: 53 1 kg (117 lb)    General: VSS, NAD, awake, alert  Well-nourished, well-developed  Appears stated age  Head: Normocephalic, atraumatic, nontender  Eyes: PERRL, EOM-I  No diplopia  No hyphema  No subconjunctival hemorrhages  Symmetrical lids  ENT: Atraumatic external nose and ears  MMM  No malocclusion  No stridor  Normal phonation  No drooling  Normal swallowing  Neck: Symmetric, trachea midline   No JVD   CV: RRR  +S1/S2  No murmurs or gallops  Peripheral pulses +2 throughout  No chest wall tenderness  Lungs:   Very very poor air movement, tight sounding    +labored breathing  No retractions  lungs sounds equal bilateral    +tachypnea  Abd: +BS, soft, NT/ND  No guarding  No rigidity  MSK:   FROM   No lower extremity edema  Back:   No CVAT  Skin: Dry, intact  Neuro: AAOx3, GCS 15, CN II-XII grossly intact  Motor grossly intact  Psychiatric/Behavioral: Appropriate mood and affect   Exam: deferred  A:  - Wheezing/poor air movement  P:  - We will do IVF for insensible losses  - Will do IV steroids: given monetary concerns, will do decadron so that if she doesn't fill the prednisone, there will be some component in her system tomorrow  - We will do aggressive beta-agonist therapy, specifically 1 hour HADLEY neb   - CXR if no improvement in symptoms for PNA  - Given how dyspneic the patient appears, will do IV magnesium 2g over 30 minutes as well  - Will continue to monitor patient  - Re-assess --> disposition based on improvement  If still wheezing, tachypneic, or tachycardic, or appears ill and unable to do baseline activities, will admit  - 13 point ROS was performed and all are normal unless stated in the history above  - Nursing note reviewed  Vitals reviewed  - Orders placed by myself and/or advanced practitioner / resident     - Previous chart was reviewed  - No language barrier    - History obtained from patient  - There are no limitations to the history obtained  - Critical care time: 33 minutes  - Critical care time was exclusive of seperately bilable procedures and treating other patients as well as teaching time     - Critical care was necessary to treat or prevent imminent or life-threatening deterioration of the following condition: Dyspnea, COPD exacerbation necessitating 1 hour neb treatment  - Critical care time was spent personally by me on the following activities as well as the above as per the ED course and rest of chart: blood draw for specimens, obtaining history from patient / surrogate, developement of a treatment plan, discussions with consultants, evaluation of patient's response to the treatment, examination of the patient, ordering/performing treatements and interventions, re-evaluation of the patient's condition, review of old charts    ED Course as of May 27 1436   Sun May 27, 2018   1339 Procedure Note: EKG  Date/Time: 05/27/18 1:39 PM   Performed by: Paula Sumner  Authorized by: Paula Sumner   Indications / Diagnosis: chest tightness  ECG reviewed by me, the ED Provider: yes   The EKG demonstrates:  Rhythm: HR 65 normal sinus  Intervals: normal intervals  Axis: normal axis  QRS/Blocks: normal QRS  ST Changes: No acute ST Changes, no STD/DIMITRY  April 25 2017 is similar  1435 Patient feels significantly better  She is requesting 8 days off for work  I said that that is inappropriate  I'll write a work note for today but technically, I've fixed her asthma exacerbation  Will DC home on steroids  Patient's lungs on re-auscultation are completely clear, not tachycardic, normoxic         Medications   fluticasone (FLOVENT HFA) 220 mcg/act inhaler 1 puff (not administered)   sodium chloride 0 9 % bolus 1,000 mL (1,000 mL Intravenous New Bag 5/27/18 1310)   magnesium sulfate 2 g/50 mL IVPB (premix) 2 g (0 g Intravenous Stopped 5/27/18 1354)   albuterol inhalation solution 10 mg (10 mg Nebulization Given 5/27/18 1301)   ipratropium (ATROVENT) 0 02 % inhalation solution 1 mg (1 mg Nebulization Given 5/27/18 1301)   sodium chloride 0 9 % inhalation solution 3 mL (3 mL Nebulization Given 5/27/18 1301)   dexamethasone (PF) (DECADRON) injection 10 mg (10 mg Intravenous Given 5/27/18 1313)     No orders to display     Orders Placed This Encounter   Procedures    Insert peripheral IV    ECG 12 lead     Labs Reviewed - No data to display  Time reflects when diagnosis was documented in both MDM as applicable and the Disposition within this note     Time User Action Codes Description Comment    5/27/2018 12:54 PM Marychuy Smolder Add [R06 2] Wheezing     5/27/2018 12:54 PM Marychuy Smolder Add [R06 00] Dyspnea     5/27/2018 12:54 PM Marychuy Smolder Add [J45 901] Asthma exacerbation     5/27/2018 12:54 PM Marychuy Smolder Modify [R06 2] Wheezing     5/27/2018 12:54 PM Marychuy Smolder Modify [U03 612] Asthma exacerbation       ED Disposition     ED Disposition Condition Comment    Discharge  Cecelia Elizabeth discharge to home/self care  Condition at discharge: Good        Follow-up Information     Follow up With Specialties Details Why [address] [place] [address] Emergency Department Emergency Medicine Go to If symptoms worsen [address] [facility]  [phone] [place] ED, [address]  , [place], [address]    Marc Krishnamurthy PA-C Family Medicine, Physician Assistant Call in 1 day To make appointment for re-evaluation in 1 week [address] [id number] [facility]           Patient's Medications   Discharge Prescriptions    PREDNISONE 10 MG TABLET    Take 6 tablets (60 mg total) by mouth daily for 5 days       Start Date: 5/27/2018 End Date: 6/1/2018       Order Dose: 60 mg       Quantity: 30 tablet    Refills: 0     No discharge procedures on file  Prior to Admission Medications   Prescriptions Last Dose Informant Patient Reported? Taking?    Montelukast Sodium (SINGULAIR PO)   Yes Yes   Sig: Take by mouth   albuterol (2 5 mg/3 mL) 0 083 % nebulizer solution   Yes Yes   Sig: Inhale 1 each every 6 (six) hours as needed   albuterol (VENTOLIN HFA) 90 mcg/act inhaler   Yes Yes   budesonide-formoterol (SYMBICORT) 160-4 5 mcg/act inhaler   Yes Yes   ibuprofen (MOTRIN) 600 mg tablet   Yes Yes   Sig: Take by mouth   thyroid (ARMOUR THYROID) 60 MG tablet   Yes Yes   Sig: Take by mouth      Facility-Administered Medications: None       Portions of the record may have been created with voice recognition software  Occasional wrong word or "sound a like" substitutions may have occurred due to the inherent limitations of voice recognition software  Read the chart carefully and recognize, using context, where substitutions have occurred      Electronically signed by:  Ender Browning MD  05/27/18 1073

## 2018-05-27 NOTE — DISCHARGE INSTRUCTIONS
Asthma, Ambulatory Care   GENERAL INFORMATION:   Asthma  is a lung disease that makes breathing difficult  Chronic inflammation and reactions to triggers narrow the airways in your lungs  Asthma can become life-threatening if it is not managed  Common symptoms include the following:   · Coughing     · Wheezing     · Shortness of breath     · Chest tightness  Seek immediate care for the following symptoms:   · Severe shortness of breath    · Blue or gray lips or nails    · Skin around your neck and ribs pulls in with each breath    · Shortness of breath, even after you take your short-term medicine as directed     · Peak flow numbers in the red zone of your asthma action plan  Treatment for asthma  will depend on how severe it is  Medicine may decrease inflammation, open airways, and make it easier to breathe  Medicines may be inhaled, taken as a pill, or injected  Short-term medicines relieve your symptoms quickly  Long-term medicines are used to prevent future attacks  You may also need medicine to help control your allergies  Manage and prevent future asthma attacks:   · Follow your asthma action pan  This is a written plan that you and your healthcare provider create  It explains which medicine you need and when to change doses if necessary  It also explains how you can monitor symptoms and use a peak flow meter  The meter measures how well your lungs are working  · Manage other health conditions , such as allergies, acid reflux, and sleep apnea  · Identify and avoid triggers  These may include pets, dust mites, mold, and cockroaches  · Do not smoke and avoid others who smoke  If you smoke, it is never too late to quit  Ask your healthcare provider if you need help quitting  · Ask about a flu vaccine  The flu can make your asthma worse  You may need a yearly flu shot  Follow up with your healthcare provider as directed:   You will need to return to make sure your medicine is working and your symptoms are controlled  You may be referred to an asthma or allergy specialist  Maallen Whittaker may be asked to keep a record of your peak flow values and bring it with you to your appointments  Write down your questions so you remember to ask them during your visits  CARE AGREEMENT:   You have the right to help plan your care  Learn about your health condition and how it may be treated  Discuss treatment options with your caregivers to decide what care you want to receive  You always have the right to refuse treatment  The above information is an  only  It is not intended as medical advice for individual conditions or treatments  Talk to your doctor, nurse or pharmacist before following any medical regimen to see if it is safe and effective for you  © 2014 [address] is for End User's use only and may not be sold, redistributed or otherwise used for commercial purposes  All illustrations and images included in CareNotes® are the copyrighted property of A D A M , Inc  or Abhinav Nagel

## 2018-05-29 ENCOUNTER — TELEPHONE (OUTPATIENT)
Dept: FAMILY MEDICINE CLINIC | Facility: CLINIC | Age: 38
End: 2018-05-29

## 2018-05-29 LAB
ATRIAL RATE: 65 BPM
P AXIS: 70 DEGREES
PR INTERVAL: 148 MS
QRS AXIS: 72 DEGREES
QRSD INTERVAL: 70 MS
QT INTERVAL: 410 MS
QTC INTERVAL: 426 MS
T WAVE AXIS: 20 DEGREES
VENTRICULAR RATE: 65 BPM

## 2018-05-29 PROCEDURE — 93010 ELECTROCARDIOGRAM REPORT: CPT | Performed by: INTERNAL MEDICINE

## 2018-05-29 NOTE — TELEPHONE ENCOUNTER
----- Message from Brayan Dupree PA-C sent at 5/29/2018  7:09 AM EDT -----  Regarding: ER FU for asthma  Patient needs an ER follow-up from 05/27 for asthma

## 2019-09-12 ENCOUNTER — LAB (OUTPATIENT)
Dept: LAB | Facility: CLINIC | Age: 39
End: 2019-09-12
Payer: COMMERCIAL

## 2019-09-12 ENCOUNTER — OFFICE VISIT (OUTPATIENT)
Dept: FAMILY MEDICINE CLINIC | Facility: CLINIC | Age: 39
End: 2019-09-12

## 2019-09-12 ENCOUNTER — TRANSCRIBE ORDERS (OUTPATIENT)
Dept: LAB | Facility: CLINIC | Age: 39
End: 2019-09-12

## 2019-09-12 VITALS
OXYGEN SATURATION: 99 % | BODY MASS INDEX: 18.23 KG/M2 | DIASTOLIC BLOOD PRESSURE: 78 MMHG | WEIGHT: 102.9 LBS | HEIGHT: 63 IN | HEART RATE: 109 BPM | TEMPERATURE: 97.3 F | SYSTOLIC BLOOD PRESSURE: 106 MMHG | RESPIRATION RATE: 18 BRPM

## 2019-09-12 DIAGNOSIS — E06.3 HYPOTHYROIDISM DUE TO HASHIMOTO'S THYROIDITIS: ICD-10-CM

## 2019-09-12 DIAGNOSIS — R00.0 TACHYCARDIA: ICD-10-CM

## 2019-09-12 DIAGNOSIS — E03.8 HYPOTHYROIDISM DUE TO HASHIMOTO'S THYROIDITIS: ICD-10-CM

## 2019-09-12 DIAGNOSIS — E03.8 HYPOTHYROIDISM DUE TO HASHIMOTO'S THYROIDITIS: Primary | ICD-10-CM

## 2019-09-12 DIAGNOSIS — R25.1 OCCASIONAL TREMORS: ICD-10-CM

## 2019-09-12 DIAGNOSIS — R63.4 WEIGHT LOSS: ICD-10-CM

## 2019-09-12 DIAGNOSIS — E06.3 HYPOTHYROIDISM DUE TO HASHIMOTO'S THYROIDITIS: Primary | ICD-10-CM

## 2019-09-12 LAB
ALBUMIN SERPL BCP-MCNC: 4.1 G/DL (ref 3.5–5)
ALP SERPL-CCNC: 41 U/L (ref 46–116)
ALT SERPL W P-5'-P-CCNC: 70 U/L (ref 12–78)
ANION GAP SERPL CALCULATED.3IONS-SCNC: 6 MMOL/L (ref 4–13)
AST SERPL W P-5'-P-CCNC: 34 U/L (ref 5–45)
BASOPHILS # BLD AUTO: 0.04 THOUSANDS/ΜL (ref 0–0.1)
BASOPHILS NFR BLD AUTO: 1 % (ref 0–1)
BILIRUB SERPL-MCNC: 0.55 MG/DL (ref 0.2–1)
BUN SERPL-MCNC: 20 MG/DL (ref 5–25)
CALCIUM SERPL-MCNC: 9.1 MG/DL (ref 8.3–10.1)
CHLORIDE SERPL-SCNC: 110 MMOL/L (ref 100–108)
CO2 SERPL-SCNC: 23 MMOL/L (ref 21–32)
CREAT SERPL-MCNC: 0.95 MG/DL (ref 0.6–1.3)
EOSINOPHIL # BLD AUTO: 0.04 THOUSAND/ΜL (ref 0–0.61)
EOSINOPHIL NFR BLD AUTO: 1 % (ref 0–6)
ERYTHROCYTE [DISTWIDTH] IN BLOOD BY AUTOMATED COUNT: 13.1 % (ref 11.6–15.1)
GFR SERPL CREATININE-BSD FRML MDRD: 77 ML/MIN/1.73SQ M
GLUCOSE SERPL-MCNC: 92 MG/DL (ref 65–140)
HCT VFR BLD AUTO: 45.8 % (ref 34.8–46.1)
HGB BLD-MCNC: 14.9 G/DL (ref 11.5–15.4)
IMM GRANULOCYTES # BLD AUTO: 0.02 THOUSAND/UL (ref 0–0.2)
IMM GRANULOCYTES NFR BLD AUTO: 0 % (ref 0–2)
LYMPHOCYTES # BLD AUTO: 1.1 THOUSANDS/ΜL (ref 0.6–4.47)
LYMPHOCYTES NFR BLD AUTO: 13 % (ref 14–44)
MCH RBC QN AUTO: 30 PG (ref 26.8–34.3)
MCHC RBC AUTO-ENTMCNC: 32.5 G/DL (ref 31.4–37.4)
MCV RBC AUTO: 92 FL (ref 82–98)
MONOCYTES # BLD AUTO: 0.25 THOUSAND/ΜL (ref 0.17–1.22)
MONOCYTES NFR BLD AUTO: 3 % (ref 4–12)
NEUTROPHILS # BLD AUTO: 6.74 THOUSANDS/ΜL (ref 1.85–7.62)
NEUTS SEG NFR BLD AUTO: 82 % (ref 43–75)
NRBC BLD AUTO-RTO: 0 /100 WBCS
PLATELET # BLD AUTO: 227 THOUSANDS/UL (ref 149–390)
PMV BLD AUTO: 11.2 FL (ref 8.9–12.7)
POTASSIUM SERPL-SCNC: 3.3 MMOL/L (ref 3.5–5.3)
PROT SERPL-MCNC: 7.8 G/DL (ref 6.4–8.2)
RBC # BLD AUTO: 4.96 MILLION/UL (ref 3.81–5.12)
SODIUM SERPL-SCNC: 139 MMOL/L (ref 136–145)
T3 SERPL-MCNC: 1.2 NG/ML (ref 0.6–1.8)
T3FREE SERPL-MCNC: 2.3 PG/ML (ref 2.3–4.2)
TSH SERPL DL<=0.05 MIU/L-ACNC: 1.1 UIU/ML (ref 0.36–3.74)
WBC # BLD AUTO: 8.19 THOUSAND/UL (ref 4.31–10.16)

## 2019-09-12 PROCEDURE — 85025 COMPLETE CBC W/AUTO DIFF WBC: CPT

## 2019-09-12 PROCEDURE — 84481 FREE ASSAY (FT-3): CPT

## 2019-09-12 PROCEDURE — 84443 ASSAY THYROID STIM HORMONE: CPT

## 2019-09-12 PROCEDURE — 80053 COMPREHEN METABOLIC PANEL: CPT

## 2019-09-12 PROCEDURE — 99214 OFFICE O/P EST MOD 30 MIN: CPT | Performed by: FAMILY MEDICINE

## 2019-09-12 PROCEDURE — 84480 ASSAY TRIIODOTHYRONINE (T3): CPT

## 2019-09-12 PROCEDURE — 87521 HEPATITIS C PROBE&RVRS TRNSC: CPT

## 2019-09-12 PROCEDURE — 36415 COLL VENOUS BLD VENIPUNCTURE: CPT

## 2019-09-12 PROCEDURE — 3008F BODY MASS INDEX DOCD: CPT | Performed by: FAMILY MEDICINE

## 2019-09-12 PROCEDURE — 80307 DRUG TEST PRSMV CHEM ANLYZR: CPT | Performed by: STUDENT IN AN ORGANIZED HEALTH CARE EDUCATION/TRAINING PROGRAM
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
## 2019-09-13 ENCOUNTER — TELEPHONE (OUTPATIENT)
Dept: FAMILY MEDICINE CLINIC | Facility: CLINIC | Age: 39
End: 2019-09-13

## 2019-09-13 ENCOUNTER — HOSPITAL ENCOUNTER (EMERGENCY)
Facility: HOSPITAL | Age: 39
Discharge: HOME | End: 2019-09-13
Attending: EMERGENCY MEDICINE
Payer: COMMERCIAL

## 2019-09-13 VITALS
SYSTOLIC BLOOD PRESSURE: 124 MMHG | HEART RATE: 62 BPM | OXYGEN SATURATION: 98 % | RESPIRATION RATE: 16 BRPM | DIASTOLIC BLOOD PRESSURE: 82 MMHG

## 2019-09-13 DIAGNOSIS — R89.9 ABNORMAL LABORATORY TEST RESULT: Primary | ICD-10-CM

## 2019-09-13 PROBLEM — R00.0 TACHYCARDIA: Status: ACTIVE | Noted: 2019-09-13

## 2019-09-13 PROBLEM — R25.1 OCCASIONAL TREMORS: Status: ACTIVE | Noted: 2019-09-13

## 2019-09-13 PROBLEM — R63.4 WEIGHT LOSS: Status: ACTIVE | Noted: 2019-09-13

## 2019-09-13 PROBLEM — F41.9 ANXIETY: Status: ACTIVE | Noted: 2019-09-13

## 2019-09-13 LAB
ANION GAP SERPL CALCULATED.3IONS-SCNC: 11 MMOL/L (ref 4–13)
BUN SERPL-MCNC: 17 MG/DL (ref 5–25)
CALCIUM SERPL-MCNC: 9.1 MG/DL (ref 8.3–10.1)
CHLORIDE SERPL-SCNC: 108 MMOL/L (ref 100–108)
CO2 SERPL-SCNC: 23 MMOL/L (ref 21–32)
CREAT SERPL-MCNC: 0.88 MG/DL (ref 0.6–1.3)
GFR SERPL CREATININE-BSD FRML MDRD: 84 ML/MIN/1.73SQ M
GLUCOSE SERPL-MCNC: 73 MG/DL (ref 65–140)
POTASSIUM SERPL-SCNC: 3.8 MMOL/L (ref 3.5–5.3)
SODIUM SERPL-SCNC: 142 MMOL/L (ref 136–145)

## 2019-09-13 PROCEDURE — 80048 BASIC METABOLIC PNL TOTAL CA: CPT | Performed by: EMERGENCY MEDICINE

## 2019-09-13 PROCEDURE — 93005 ELECTROCARDIOGRAM TRACING: CPT

## 2019-09-13 PROCEDURE — 36415 COLL VENOUS BLD VENIPUNCTURE: CPT | Performed by: EMERGENCY MEDICINE

## 2019-09-13 PROCEDURE — 99283 EMERGENCY DEPT VISIT LOW MDM: CPT

## 2019-09-13 PROCEDURE — 99282 EMERGENCY DEPT VISIT SF MDM: CPT | Performed by: EMERGENCY MEDICINE

## 2019-09-13 NOTE — TELEPHONE ENCOUNTER
Called ED and case discussed with Dr Nechama Curling  K 3 3 and patient refused  supplemental K PO, and preferred IV K, with history of MI 2/2 hypokalemia and her recent tachycardia concerned for arhythmia, dehydration, other electrolyte abnormality including mag and phos  I am ok with her receiving potasium if patient agrees

## 2019-09-13 NOTE — TELEPHONE ENCOUNTER
Called patient to inform her of lab results with hypokalemia  She states "oh no, not that again"  She continues to explain that she had history of hypokalemia  When she did have hypokalemia and she would have  muscle paralysis, and in fact had a MI during her hypokalemia episodes  Was hospitalized multiple [facility] [place] and treated with IV potasium  Currently denies any headache, chest pain, palpitation, shortness of breath, fevers, chills, nausea, vomiting, diarrhea, constipation  Patient denies any urinary symptoms  patient was instructed to go to [facility] , she said she will go to [facility] for electrolyte replacement (K)replacement, and check mag and phos, and EKG  In light of history of intermittent paralysis and MI which was strongly coordinated with hypokalemia per patient would like to rule out any arrhythmias within EKG and to monitor and replete electrolytes  I discussed with patient if she would to take Po potassium she stated that this was always treated with IV potasium in the past  Due to the fact I am not very familiar with patient, no medical records available,  and that she has a complex medical history, and patient's preference to be treated at the hospital I will send to the ED for work up and treatment

## 2019-09-13 NOTE — ASSESSMENT & PLAN NOTE
Tremor, hotflash, and tachycardia improved bu twas still present toward the end of office visit  At times patient looked anxious but when asked if she was anxious she said she did not feel anxious  Patient denies any chest pain or shortness of breath  Unclear of this tremor at this time, the could be secondary of thyroid dysfunction, electrolyte imbalance, anxiety, endocrine like metanephrine or adrenal in origin  With a mixed history that seems somewhat intermittent and contradicting with hyper or hypo thyroidism,  hyper or hypotension will evaluate thyroid fx, CBC and electrolyte at this time    Patient does have history of drug use and anxiety would like to rule out organic cause first   Patient agreed to urine tox screen   U tox ordered  Will call patient with results and follow up with her

## 2019-09-13 NOTE — ASSESSMENT & PLAN NOTE
Unknown reason at this time  Patient denies any shortness of breath or chest pain or lightheadedness  Differential include thyroid or metabolic derangement, anxiety, dehydration  CBC, CMP  Encourage oral hydration

## 2019-09-14 NOTE — ED PROVIDER NOTES
History  Chief Complaint   Patient presents with    Abnormal Lab     states with low potassium told by pcp to go to ER for IV potassium       History provided by:  Patient and medical records  Evaluation of Abnormal Diagnostic Test   Time since result:  1 day  Patient referred by:  ED personnel  Resulting agency:  Internal  Result type: chemistry    Chemistry:     Potassium:  Low      Prior to Admission Medications   Prescriptions Last Dose Informant Patient Reported? Taking? Montelukast Sodium (SINGULAIR PO)   Yes No   Sig: Take by mouth   albuterol (2 5 mg/3 mL) 0 083 % nebulizer solution   Yes No   Sig: Inhale 1 each every 6 (six) hours as needed   albuterol (VENTOLIN HFA) 90 mcg/act inhaler   Yes No   budesonide-formoterol (SYMBICORT) 160-4 5 mcg/act inhaler   Yes No   ibuprofen (MOTRIN) 600 mg tablet   Yes No   Sig: Take by mouth   thyroid (ARMOUR THYROID) 60 MG tablet   Yes No   Sig: Take by mouth      Facility-Administered Medications: None       Past Medical History:   Diagnosis Date    Anemia     Anxiety     Arthritis     Asthma     COPD (chronic obstructive pulmonary disease) (HCC)     Coronary artery disease     GERD (gastroesophageal reflux disease)     Heart murmur     Hypertension     Hypothyroidism     Infectious viral hepatitis     Mitral valve regurgitation     My[facility] infarction ([facility] [address] )     Pneumonia     Substance abuse ([facility] [address] )     herion last used 3 5 years     Tobacco abuse     Urinary tract infection        Past Surgical History:   Procedure Laterality Date    APPENDECTOMY      OVARIAN CYST REMOVAL         History reviewed  No pertinent family history  I have reviewed and agree with the history as documented      Social History     Tobacco Use    Smoking status: Current Every Day Smoker     Packs/day: 0 50     Types: Cigarettes    Smokeless tobacco: Never Used   Substance Use Topics    Alcohol use: No    Drug use: Yes     Types: Marijuana     Comment: gel for arthrisis Review of Systems   Constitutional: Negative for activity change, appetite change, fatigue and fever  HENT: Negative for congestion, dental problem, ear pain, rhinorrhea and sore throat  Eyes: Negative for pain and redness  Respiratory: Negative for chest tightness, shortness of breath and wheezing  Cardiovascular: Negative for chest pain and palpitations  Gastrointestinal: Negative for abdominal pain, blood in stool, constipation, diarrhea, nausea and vomiting  Endocrine: Negative for cold intolerance and heat intolerance  Genitourinary: Negative for dysuria, frequency and hematuria  Musculoskeletal: Negative for arthralgias and myalgias  Skin: Negative for color change, pallor and rash  Neurological: Negative for weakness and numbness  Hematological: Does not bruise/bleed easily  Psychiatric/Behavioral: Negative for agitation, hallucinations and suicidal ideas  Physical Exam  Physical Exam   Constitutional: She is oriented to person, place, and time  She appears well-developed and well-nourished  HENT:   Mouth/Throat: No oropharyngeal exudate  TMs normal bilaterally no pharyngeal erythema no rhinorrhea nontender palpation of sinuses, normal looking turbinates   Eyes: Conjunctivae and EOM are normal    Neck: Normal range of motion  Neck supple  No meningeal signs   Cardiovascular: Normal rate, regular rhythm, normal heart sounds and intact distal pulses  Pulmonary/Chest: Effort normal and breath sounds normal  No respiratory distress  She has no wheezes  She has no rales  She exhibits no tenderness  Abdominal: Soft  Bowel sounds are normal  She exhibits no distension and no mass  There is no tenderness  No hernia  No cvat   Musculoskeletal: Normal range of motion  She exhibits no edema  Lymphadenopathy:     She has no cervical adenopathy  Neurological: She is alert and oriented to person, place, and time  No cranial nerve deficit  Skin: No rash noted   No erythema  No edema   Psychiatric: She has a normal mood and affect  Her behavior is normal    Nursing note and vitals reviewed        Vital Signs  ED Triage Vitals [09/13/19 1953]   Temp Pulse Respirations Blood Pressure SpO2   -- 62 16 124/82 98 %      Temp src Heart Rate Source Patient Position - Orthostatic VS BP Location FiO2 (%)   -- Monitor Sitting Right arm --      Pain Score       3           Vitals:    09/13/19 1953   BP: 124/82   Pulse: 62   Patient Position - Orthostatic VS: Sitting         Visual Acuity      ED Medications  Medications - No data to display    Diagnostic Studies  Results Reviewed     Procedure Component Value Units Date/Time    Basic metabolic panel [[id number]] Collected:  09/13/19 2036    Lab Status:  Final result Specimen:  Blood from Arm, Right Updated:  09/13/19 2102     Sodium 142 mmol/L      Potassium 3 8 mmol/L      Chloride 108 mmol/L      CO2 23 mmol/L      ANION GAP 11 mmol/L      BUN 17 mg/dL      Creatinine 0 88 mg/dL      Glucose 73 mg/dL      Calcium 9 1 mg/dL      eGFR 84 ml/min/1 73sq m     Narrative:       [place] guidelines for Chronic Kidney Disease (CKD):     Stage 1 with normal or high GFR (GFR > 90 mL/min/1 73 square meters)    Stage 2 Mild CKD (GFR = 60-89 mL/min/1 73 square meters)    Stage 3A Moderate CKD (GFR = 45-59 mL/min/1 73 square meters)    Stage 3B Moderate CKD (GFR = 30-44 mL/min/1 73 square meters)    Stage 4 Severe CKD (GFR = 15-29 mL/min/1 73 square meters)    Stage 5 End Stage CKD (GFR <15 mL/min/1 73 square meters)  Note: GFR calculation is accurate only with a steady state creatinine                 No orders to display              Procedures  Procedures       ED Course                               MDM  Number of Diagnoses or Management Options  Abnormal laboratory test result:   Diagnosis management comments: Abnormal lab-will repeat lab, ekg, reassess      Disposition  Final diagnoses:   Abnormal laboratory test result - resolved     Time reflects when diagnosis was documented in both MDM as applicable and the Disposition within this note     Time User Action Codes Description Comment    9/13/2019  9:19 PM Rakesh Matter Add [R89 9] Abnormal laboratory test result     9/13/2019  9:19 PM Rakesh Matter Modify [R89 9] Abnormal laboratory test result resolved      ED Disposition     ED Disposition Condition Date/Time Comment    Discharge Stable Fri Sep 13, 2019  9:19 PM Marylene Frame discharge to home/self care  Follow-up Information     Follow up With Specialties Details Why Contact Info Additional [address] Family Medicine Schedule an appointment as soon as possible for a visit in 2 days  [address], [address] [facility] [address]  [address], [address], [address] [place], [place], [address]    [phone]             Discharge Medication List as of 9/13/2019  9:20 PM      CONTINUE these medications which have NOT CHANGED    Details   albuterol (2 5 mg/3 mL) 0 083 % nebulizer solution Inhale 1 each every 6 (six) hours as needed, Starting Wed 9/17/2014, Historical Med      albuterol (VENTOLIN HFA) 90 mcg/act inhaler Starting Wed 7/1/2015, Historical Med      budesonide-formoterol (SYMBICORT) 160-4 5 mcg/act inhaler Starting Fri 9/11/2015, Historical Med      ibuprofen (MOTRIN) 600 mg tablet Take by mouth, Starting Thu 6/11/2015, Historical Med      Montelukast Sodium (SINGULAIR PO) Take by mouth, Historical Med      thyroid (ARMOUR THYROID) 60 MG tablet Take by mouth, Starting Tue 10/14/2014, Historical Med           No discharge procedures on file      ED Provider  Electronically Signed by           So Guevara MD  09/13/19 9709

## 2019-09-15 LAB
ATRIAL RATE: 65 BPM
P AXIS: 88 DEGREES
PR INTERVAL: 144 MS
QRS AXIS: 83 DEGREES
QRSD INTERVAL: 70 MS
QT INTERVAL: 420 MS
QTC INTERVAL: 436 MS
T WAVE AXIS: 67 DEGREES
VENTRICULAR RATE: 65 BPM

## 2019-09-15 PROCEDURE — 93010 ELECTROCARDIOGRAM REPORT: CPT | Performed by: INTERNAL MEDICINE

## 2019-09-17 ENCOUNTER — TELEPHONE (OUTPATIENT)
Dept: FAMILY MEDICINE CLINIC | Facility: CLINIC | Age: 39
End: 2019-09-17

## 2019-09-17 DIAGNOSIS — R63.4 WEIGHT LOSS: Primary | ICD-10-CM

## 2019-09-17 LAB
AMPHETAMINES UR QL SCN: NEGATIVE NG/ML
BARBITURATES UR QL SCN: NEGATIVE NG/ML
BENZODIAZ UR QL: NEGATIVE NG/ML
BZE UR QL: NEGATIVE NG/ML
CANNABINOIDS UR QL SCN: POSITIVE
METHADONE UR QL SCN: NEGATIVE NG/ML
OPIATES UR QL: NEGATIVE NG/ML
PCP UR QL: NEGATIVE NG/ML
PROPOXYPH UR QL SCN: NEGATIVE NG/ML

## 2019-09-17 NOTE — TELEPHONE ENCOUNTER
Patient came into office stating you were to write a letter medically clearing her to work  She says she did everything you asked of her and she really needs this paper

## 2019-09-17 NOTE — TELEPHONE ENCOUNTER
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 she may need to be clear for hepatitis a and improve of PPD testing or x-ray pending what her job requires  Currently patient feels overall well and offers no complaints  Patient reports anxiety and tremors have resolved  She did root report to the ED after speaking to her on Friday where they recheck her potassium and was 3 8 she reports no treatment was given  Patient denies any headache, chest pain, palpitation, shortness of breath, fevers, chills, nausea, vomiting, diarrhea, constipation,  Patient denies any urinary symptoms  Patient to make an appointment with me in 2-6 weeks to follow-up on current labs and previous documents that she is provided from PCP

## 2019-09-17 NOTE — LETTER
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

## 2019-09-20 ENCOUNTER — TELEPHONE (OUTPATIENT)
Dept: FAMILY MEDICINE CLINIC | Facility: CLINIC | Age: 39
End: 2019-09-20

## 2019-09-20 LAB — MISCELLANEOUS LAB TEST RESULT: NORMAL

## 2019-09-20 NOTE — TELEPHONE ENCOUNTER
LM on VM  PFT apt([phone]) is on 10/1/2019 at 12:30 pm at [address]   Pt to not take any inhalers or neb machine meds 6 hrs before test and no caffeine 4 hrs before

## 2019-09-24 ENCOUNTER — HOSPITAL ENCOUNTER (EMERGENCY)
Facility: HOSPITAL | Age: 39
Discharge: HOME | End: 2019-09-25
Attending: EMERGENCY MEDICINE | Admitting: EMERGENCY MEDICINE
Payer: COMMERCIAL

## 2019-09-24 DIAGNOSIS — T78.40XA ACUTE ALLERGIC REACTION, INITIAL ENCOUNTER: Primary | ICD-10-CM

## 2019-09-24 DIAGNOSIS — L50.9 URTICARIA: ICD-10-CM

## 2019-09-24 DIAGNOSIS — T78.2XXA ANAPHYLAXIS, INITIAL ENCOUNTER: ICD-10-CM

## 2019-09-24 PROCEDURE — 96374 THER/PROPH/DIAG INJ IV PUSH: CPT

## 2019-09-24 PROCEDURE — 96361 HYDRATE IV INFUSION ADD-ON: CPT

## 2019-09-24 PROCEDURE — 99284 EMERGENCY DEPT VISIT MOD MDM: CPT

## 2019-09-24 PROCEDURE — 99291 CRITICAL CARE FIRST HOUR: CPT | Performed by: EMERGENCY MEDICINE

## 2019-09-24 PROCEDURE — 96375 TX/PRO/DX INJ NEW DRUG ADDON: CPT

## 2019-09-24 PROCEDURE — 96372 THER/PROPH/DIAG INJ SC/IM: CPT

## 2019-09-24 RX ORDER — ONDANSETRON 2 MG/ML
4 INJECTION INTRAMUSCULAR; INTRAVENOUS ONCE
Status: COMPLETED | OUTPATIENT
Start: 2019-09-24 | End: 2019-09-24

## 2019-09-24 RX ORDER — DIPHENHYDRAMINE HYDROCHLORIDE 50 MG/ML
25 INJECTION INTRAMUSCULAR; INTRAVENOUS ONCE
Status: COMPLETED | OUTPATIENT
Start: 2019-09-24 | End: 2019-09-24

## 2019-09-24 RX ORDER — EPINEPHRINE 1 MG/ML
0.3 INJECTION, SOLUTION, CONCENTRATE INTRAVENOUS ONCE
Status: COMPLETED | OUTPATIENT
Start: 2019-09-24 | End: 2019-09-24

## 2019-09-24 RX ORDER — METHYLPREDNISOLONE SODIUM SUCCINATE 125 MG/2ML
80 INJECTION, POWDER, LYOPHILIZED, FOR SOLUTION INTRAMUSCULAR; INTRAVENOUS ONCE
Status: COMPLETED | OUTPATIENT
Start: 2019-09-24 | End: 2019-09-24

## 2019-09-24 RX ADMIN — EPINEPHRINE 0.3 MG: 1 INJECTION, SOLUTION, CONCENTRATE INTRAVENOUS at 23:08

## 2019-09-24 RX ADMIN — ONDANSETRON 4 MG: 2 INJECTION INTRAMUSCULAR; INTRAVENOUS at 23:03

## 2019-09-24 RX ADMIN — SODIUM CHLORIDE 1000 ML: 0.9 INJECTION, SOLUTION INTRAVENOUS at 23:01

## 2019-09-24 RX ADMIN — DIPHENHYDRAMINE HYDROCHLORIDE 25 MG: 50 INJECTION, SOLUTION INTRAMUSCULAR; INTRAVENOUS at 23:05

## 2019-09-24 RX ADMIN — METHYLPREDNISOLONE SODIUM SUCCINATE 80 MG: 125 INJECTION, POWDER, FOR SOLUTION INTRAMUSCULAR; INTRAVENOUS at 23:01

## 2019-09-25 VITALS
DIASTOLIC BLOOD PRESSURE: 88 MMHG | HEART RATE: 89 BPM | RESPIRATION RATE: 18 BRPM | SYSTOLIC BLOOD PRESSURE: 134 MMHG | BODY MASS INDEX: 16.09 KG/M2 | OXYGEN SATURATION: 98 % | WEIGHT: 90.83 LBS | TEMPERATURE: 98.7 F

## 2019-09-25 PROCEDURE — 96361 HYDRATE IV INFUSION ADD-ON: CPT

## 2019-09-25 RX ORDER — DIPHENHYDRAMINE HCL 25 MG
50 TABLET ORAL EVERY 8 HOURS PRN
Qty: 20 TABLET | Refills: 0 | Status: SHIPPED | OUTPATIENT
Start: 2019-09-25 | End: 2019-12-04

## 2019-09-25 RX ORDER — PREDNISONE 20 MG/1
40 TABLET ORAL DAILY
Qty: 10 TABLET | Refills: 0 | Status: SHIPPED | OUTPATIENT
Start: 2019-09-25 | End: 2019-09-30

## 2019-09-25 RX ORDER — EPINEPHRINE 0.3 MG/.3ML
0.3 INJECTION SUBCUTANEOUS ONCE
Qty: 0.6 ML | Refills: 0 | Status: SHIPPED | OUTPATIENT
Start: 2019-09-25 | End: 2019-11-27 | Stop reason: SDUPTHER

## 2019-09-25 NOTE — ED PROVIDER NOTES
History  Chief Complaint   Patient presents with    Allergic Reaction     Pt arrives via EMS  Pt reported touching banana nut bread, is allergic to nuts, approx  9:45pm pt reports nausea and vomiting, hives all over  SOB and trouble breathing  61-year-old female presents for evaluation of a generalized allergic reaction that began after she dinner at a friend's house  Patient came home and started to have some itching in the eyes  She then took a shower and had swelling of her face with trouble swallowing  She noticed that she had diffuse urticaria and called 911  The patient was given 50 mg of IV Benadryl by EMS and had improvement without resolution of her symptoms  The patient still feels like she is tight in her chest now having trouble breathing  The patient also tremulous at this time  Patient still has persistent redness and itching of her skin  Patient believes she has multiple allergies to nuts, and Ativan however the patient does not recall specifically eating any foods that contained any known allergens  Prior to Admission Medications   Prescriptions Last Dose Informant Patient Reported? Taking?    Montelukast Sodium (SINGULAIR PO)   Yes No   Sig: Take by mouth   albuterol (2 5 mg/3 mL) 0 083 % nebulizer solution   Yes No   Sig: Inhale 1 each every 6 (six) hours as needed   albuterol (VENTOLIN HFA) 90 mcg/act inhaler   Yes No   budesonide-formoterol (SYMBICORT) 160-4 5 mcg/act inhaler   Yes No   ibuprofen (MOTRIN) 600 mg tablet   Yes No   Sig: Take by mouth   thyroid (ARMOUR THYROID) 60 MG tablet   Yes No   Sig: Take by mouth      Facility-Administered Medications: None       Past Medical History:   Diagnosis Date    Anemia     Anxiety     Arthritis     Asthma     COPD (chronic obstructive pulmonary disease) (HCC)     Coronary artery disease     GERD (gastroesophageal reflux disease)     Heart murmur     Hypertension     Hypothyroidism     Infectious viral hepatitis     Mitral valve regurgitation     [facility] [address] [facility]     Pneumonia     Substance abuse ([facility] [facility]     herion last used 3 5 years     Tobacco abuse     Urinary tract infection        Past Surgical History:   Procedure Laterality Date    APPENDECTOMY      OVARIAN CYST REMOVAL         History reviewed  No pertinent family history  I have reviewed and agree with the history as documented  Social History     Tobacco Use    Smoking status: Current Every Day Smoker     Packs/day: 0 50     Types: Cigarettes    Smokeless tobacco: Never Used   Substance Use Topics    Alcohol use: No    Drug use: Not Currently     Comment: gel for arthrisis        Review of Systems   Eyes: Positive for itching  Respiratory: Positive for shortness of breath and stridor  Skin: Positive for color change  All other systems reviewed and are negative  Physical Exam  Physical Exam   Constitutional: She is oriented to person, place, and time  She appears well-developed and well-nourished  No distress  HENT:   Head: Normocephalic and atraumatic  Right Ear: External ear normal    Left Ear: External ear normal    Eyes: Pupils are equal, round, and reactive to light  Conjunctivae and EOM are normal  No scleral icterus  Neck: Normal range of motion  Cardiovascular: Normal rate, regular rhythm and normal heart sounds  Pulmonary/Chest: Effort normal  Stridor present  Tachypnea noted  No respiratory distress  She has decreased breath sounds  She has wheezes ( Diffuse)  Abdominal: Soft  Bowel sounds are normal  There is no tenderness  There is no rebound and no guarding  Musculoskeletal: Normal range of motion  She exhibits no edema  Neurological: She is alert and oriented to person, place, and time  She displays tremor  Skin: Skin is warm and dry  Rash noted  Rash is urticarial ( Diffuse)  Psychiatric: Her mood appears anxious  Nursing note and vitals reviewed        Vital Signs  ED Triage Vitals   Temperature Pulse Respirations Blood Pressure SpO2   09/24/19 2245 09/24/19 2240 09/24/19 2240 09/24/19 2240 09/24/19 2240   98 7 °F (37 1 °C) 88 (!) 32 (!) 177/85 98 %      Temp Source Heart Rate Source Patient Position - Orthostatic VS BP Location FiO2 (%)   09/24/19 2245 09/24/19 2240 09/24/19 2240 09/24/19 2240 --   Oral Monitor Lying Right arm       Pain Score       --                  Vitals:    09/24/19 2306 09/24/19 2346 09/25/19 0151 09/25/19 0305   BP: (!) 150/107 142/75 106/60 134/88   Pulse: 79 77 87 89   Patient Position - Orthostatic VS:   Lying Sitting         Visual Acuity      ED Medications  Medications   diphenhydrAMINE (FOR EMS ONLY) (BENADRYL) injection 50 mg (0 mg Does not apply Given to EMS 9/24/19 2246)   sodium chloride 0 9 % bolus 1,000 mL (0 mL Intravenous Stopped 9/25/19 0151)   EPINEPHrine PF (ADRENALIN) 1 mg/mL injection 0 3 mg (0 3 mg Intramuscular Given 9/24/19 2308)   methylPREDNISolone sodium succinate (Solu-MEDROL) injection 80 mg (80 mg Intravenous Given 9/24/19 2301)   diphenhydrAMINE (BENADRYL) injection 25 mg (25 mg Intravenous Given 9/24/19 2305)   ondansetron (ZOFRAN) injection 4 mg (4 mg Intravenous Given 9/24/19 2303)       Diagnostic Studies  Results Reviewed     None                 No orders to display              Procedures  CriticalCare Time  Performed by: Diya Gaona DO  Authorized by:  Diya Gaona DO     Critical care provider statement:     Critical care time (minutes):  35    Critical care time was exclusive of:  Separately billable procedures and treating other patients and teaching time    Critical care was necessary to treat or prevent imminent or life-threatening deterioration of the following conditions:  Respiratory failure and cardiac failure    Critical care was time spent personally by me on the following activities:  Blood draw for specimens, obtaining history from patient or surrogate, development of treatment plan with patient or surrogate, discussions with consultants, evaluation of patient's response to treatment, examination of patient, ordering and performing treatments and interventions, re-evaluation of patient's condition, review of old charts and interpretation of cardiac output measurements    I assumed direction of critical care for this patient from another provider in my specialty: no    Comments:      Treatment of anaphylaxis           ED Course  ED Course as of Sep 26 0832   Tue Sep 24, 2019   2555 Patient clinically improved upon re-evaluation at this time  The patient is informed that she will be observed for period of 4 hours after the administration of the epinephrine  She is no longer tremulous or stridulous  The patient is signed out to Dr Rainer Cheng                                  [facility]  Number of Diagnoses or Management Options  Acute allergic reaction, initial encounter:   Anaphylaxis, initial encounter:   Urticaria:   Diagnosis management comments: The plan is for IM epinephrine and steroids and additional antihistamine given the patient's anaphylactic reaction to unknown substance  The patient was clinically improved after initial medications and observed with plan for discharge if continued clinical improvement       Amount and/or Complexity of Data Reviewed  Review and summarize past medical records: yes        Disposition  Final diagnoses:   Acute allergic reaction, initial encounter   Anaphylaxis, initial encounter   Urticaria     Time reflects when diagnosis was documented in both MDM as applicable and the Disposition within this note     Time User Action Codes Description Comment    9/25/2019  2:51 AM Lemon Fu M Add [T78 40XA] Acute allergic reaction, initial encounter     9/25/2019  2:51 AM [address]  [address]  2XXA] Anaphylaxis, initial encounter     9/25/2019  2:51 AM Ajay WEAVER Add [L50 9] Urticaria       ED Disposition     ED Disposition Condition Date/Time Comment    Discharge Stable Wed Sep 25, 2019  2:51 AM Addi Lassiter discharge to home/self care  Follow-up Information    None         Discharge Medication List as of 9/25/2019  2:52 AM      START taking these medications    Details   diphenhydrAMINE (BENADRYL) 25 mg tablet Take 2 tablets (50 mg total) by mouth every 8 (eight) hours as needed for itching, Starting Wed 9/25/2019, Print      EPINEPHrine (EPIPEN) 0 3 mg/0 3 mL SOAJ Inject 0 3 mL (0 3 mg total) into a muscle once for 1 dose, Starting Wed 9/25/2019, Print      predniSONE 20 mg tablet Take 2 tablets (40 mg total) by mouth daily for 5 days, Starting Wed 9/25/2019, Until Mon 9/30/2019, Print         CONTINUE these medications which have NOT CHANGED    Details   albuterol (2 5 mg/3 mL) 0 083 % nebulizer solution Inhale 1 each every 6 (six) hours as needed, Starting Wed 9/17/2014, Historical Med      albuterol (VENTOLIN HFA) 90 mcg/act inhaler Starting Wed 7/1/2015, Historical Med      budesonide-formoterol (SYMBICORT) 160-4 5 mcg/act inhaler Starting Fri 9/11/2015, Historical Med      ibuprofen (MOTRIN) 600 mg tablet Take by mouth, Starting Thu 6/11/2015, Historical Med      Montelukast Sodium (SINGULAIR PO) Take by mouth, Historical Med      thyroid (ARMOUR THYROID) 60 MG tablet Take by mouth, Starting Tue 10/14/2014, Historical Med           No discharge procedures on file      ED Provider  Electronically Signed by           Bhavani Moran,   09/26/19 5169

## 2019-09-26 ENCOUNTER — TELEPHONE (OUTPATIENT)
Dept: FAMILY MEDICINE CLINIC | Facility: CLINIC | Age: 39
End: 2019-09-26

## 2019-09-26 DIAGNOSIS — B19.20 HEPATITIS C VIRUS INFECTION WITHOUT HEPATIC COMA, UNSPECIFIED CHRONICITY: Primary | ICD-10-CM

## 2019-09-30 ENCOUNTER — TELEPHONE (OUTPATIENT)
Dept: FAMILY MEDICINE CLINIC | Facility: CLINIC | Age: 39
End: 2019-09-30

## 2019-09-30 RX ORDER — ALBUTEROL SULFATE 2.5 MG/3ML
2.5 SOLUTION RESPIRATORY (INHALATION) ONCE AS NEEDED
Status: DISCONTINUED | OUTPATIENT
Start: 2019-09-30 | End: 2019-09-30

## 2019-09-30 RX ORDER — ALBUTEROL SULFATE 2.5 MG/3ML
2.5 SOLUTION RESPIRATORY (INHALATION) ONCE AS NEEDED
Status: CANCELLED | OUTPATIENT
Start: 2019-09-30

## 2019-09-30 NOTE — TELEPHONE ENCOUNTER
LM on VM  Pt not aware of lab results yet  US of liver([phone]) is on 10/11/2019 at 9 am at [address], [place]    Pt to not eat or drink after midnight til after test   Ana Adorno ENR([phone]) is on 11/22/2019 at 10:40 am at [address], [address], [place]

## 2019-10-01 ENCOUNTER — HOSPITAL ENCOUNTER (OUTPATIENT)
Dept: PULMONOLOGY | Facility: HOSPITAL | Age: 39
Discharge: HOME | End: 2019-10-01

## 2019-10-02 ENCOUNTER — HOSPITAL ENCOUNTER (OUTPATIENT)
Dept: ULTRASOUND IMAGING | Facility: HOSPITAL | Age: 39
Discharge: HOME | End: 2019-10-02
Payer: COMMERCIAL

## 2019-10-02 ENCOUNTER — HOSPITAL ENCOUNTER (OUTPATIENT)
Dept: PULMONOLOGY | Facility: HOSPITAL | Age: 39
Discharge: HOME | End: 2019-10-02
Payer: COMMERCIAL

## 2019-10-02 DIAGNOSIS — E03.8 HYPOTHYROIDISM DUE TO HASHIMOTO'S THYROIDITIS: ICD-10-CM

## 2019-10-02 DIAGNOSIS — B19.20 HEPATITIS C VIRUS INFECTION WITHOUT HEPATIC COMA, UNSPECIFIED CHRONICITY: ICD-10-CM

## 2019-10-02 DIAGNOSIS — E06.3 HYPOTHYROIDISM DUE TO HASHIMOTO'S THYROIDITIS: ICD-10-CM

## 2019-10-02 PROCEDURE — 94060 EVALUATION OF WHEEZING: CPT | Performed by: INTERNAL MEDICINE

## 2019-10-02 PROCEDURE — 94760 N-INVAS EAR/PLS OXIMETRY 1: CPT

## 2019-10-02 PROCEDURE — 94060 EVALUATION OF WHEEZING: CPT

## 2019-10-02 PROCEDURE — 94726 PLETHYSMOGRAPHY LUNG VOLUMES: CPT

## 2019-10-02 PROCEDURE — 94729 DIFFUSING CAPACITY: CPT | Performed by: INTERNAL MEDICINE

## 2019-10-02 PROCEDURE — 94729 DIFFUSING CAPACITY: CPT

## 2019-10-02 PROCEDURE — 76705 ECHO EXAM OF ABDOMEN: CPT

## 2019-10-02 PROCEDURE — 94726 PLETHYSMOGRAPHY LUNG VOLUMES: CPT | Performed by: INTERNAL MEDICINE

## 2019-10-02 RX ORDER — ALBUTEROL SULFATE 2.5 MG/3ML
2.5 SOLUTION RESPIRATORY (INHALATION) ONCE AS NEEDED
Status: COMPLETED | OUTPATIENT
Start: 2019-10-02 | End: 2019-10-02

## 2019-10-02 RX ADMIN — ALBUTEROL SULFATE 2.5 MG: 2.5 SOLUTION RESPIRATORY (INHALATION) at 16:00

## 2019-10-09 ENCOUNTER — TELEPHONE (OUTPATIENT)
Dept: FAMILY MEDICINE CLINIC | Facility: CLINIC | Age: 39
End: 2019-10-09

## 2019-10-09 NOTE — TELEPHONE ENCOUNTER
Please contact the pt to schedule an appt with Dr Jimenez to review the PFTs (pulmonary function test) thank you as per his request

## 2019-11-12 ENCOUNTER — OFFICE VISIT (OUTPATIENT)
Dept: URGENT CARE | Age: 39
End: 2019-11-12
Payer: COMMERCIAL

## 2019-11-12 ENCOUNTER — HOSPITAL ENCOUNTER (EMERGENCY)
Facility: HOSPITAL | Age: 39
Discharge: HOME | End: 2019-11-12
Attending: EMERGENCY MEDICINE
Payer: COMMERCIAL

## 2019-11-12 VITALS
HEART RATE: 110 BPM | SYSTOLIC BLOOD PRESSURE: 126 MMHG | BODY MASS INDEX: 20.02 KG/M2 | OXYGEN SATURATION: 97 % | DIASTOLIC BLOOD PRESSURE: 78 MMHG | RESPIRATION RATE: 54 BRPM | TEMPERATURE: 98.2 F | HEIGHT: 63 IN | WEIGHT: 113 LBS

## 2019-11-12 VITALS
SYSTOLIC BLOOD PRESSURE: 130 MMHG | HEIGHT: 63 IN | RESPIRATION RATE: 18 BRPM | WEIGHT: 112 LBS | OXYGEN SATURATION: 99 % | HEART RATE: 78 BPM | TEMPERATURE: 98.5 F | DIASTOLIC BLOOD PRESSURE: 90 MMHG | BODY MASS INDEX: 19.84 KG/M2

## 2019-11-12 DIAGNOSIS — R06.2 WHEEZING: ICD-10-CM

## 2019-11-12 DIAGNOSIS — R06.02 SHORTNESS OF BREATH: Primary | ICD-10-CM

## 2019-11-12 DIAGNOSIS — R05.9 COUGH: ICD-10-CM

## 2019-11-12 DIAGNOSIS — J40 BRONCHITIS: Primary | ICD-10-CM

## 2019-11-12 PROCEDURE — 99284 EMERGENCY DEPT VISIT MOD MDM: CPT

## 2019-11-12 PROCEDURE — G0382 LEV 3 HOSP TYPE B ED VISIT: HCPCS | Performed by: PHYSICIAN ASSISTANT

## 2019-11-12 PROCEDURE — 99283 EMERGENCY DEPT VISIT LOW MDM: CPT | Performed by: PHYSICIAN ASSISTANT

## 2019-11-12 PROCEDURE — 99284 EMERGENCY DEPT VISIT MOD MDM: CPT | Performed by: STUDENT IN AN ORGANIZED HEALTH CARE EDUCATION/TRAINING PROGRAM

## 2019-11-12 RX ORDER — AZITHROMYCIN 250 MG/1
500 TABLET, FILM COATED ORAL ONCE
Status: COMPLETED | OUTPATIENT
Start: 2019-11-12 | End: 2019-11-12

## 2019-11-12 RX ORDER — BUDESONIDE AND FORMOTEROL FUMARATE DIHYDRATE 160; 4.5 UG/1; UG/1
AEROSOL RESPIRATORY (INHALATION)
COMMUNITY
Start: 2015-09-11 | End: 2019-11-12 | Stop reason: SDUPTHER

## 2019-11-12 RX ORDER — AZITHROMYCIN 250 MG/1
TABLET, FILM COATED ORAL
Qty: 6 TABLET | Refills: 0 | Status: SHIPPED | OUTPATIENT
Start: 2019-11-12 | End: 2019-11-12

## 2019-11-12 RX ORDER — IBUPROFEN 600 MG/1
TABLET ORAL
COMMUNITY
Start: 2015-06-11 | End: 2019-11-12 | Stop reason: SDUPTHER

## 2019-11-12 RX ORDER — PROCHLORPERAZINE MALEATE 10 MG
10 TABLET ORAL 3 TIMES DAILY
Refills: 0 | COMMUNITY
Start: 2019-08-23 | End: 2019-11-27 | Stop reason: SDUPTHER

## 2019-11-12 RX ORDER — ALBUTEROL SULFATE 2.5 MG/3ML
2.5 SOLUTION RESPIRATORY (INHALATION) ONCE
Status: COMPLETED | OUTPATIENT
Start: 2019-11-12 | End: 2019-11-12

## 2019-11-12 RX ORDER — PREDNISONE 20 MG/1
40 TABLET ORAL DAILY
Qty: 10 TABLET | Refills: 0 | Status: SHIPPED | OUTPATIENT
Start: 2019-11-12 | End: 2019-11-17

## 2019-11-12 RX ORDER — DICYCLOMINE HCL 20 MG
20 TABLET ORAL EVERY 6 HOURS PRN
Refills: 0 | COMMUNITY
Start: 2019-08-23 | End: 2019-11-27 | Stop reason: SDUPTHER

## 2019-11-12 RX ORDER — NAPROXEN SODIUM 550 MG/1
TABLET ORAL
COMMUNITY
Start: 2016-03-16 | End: 2020-09-02

## 2019-11-12 RX ORDER — PREDNISONE 10 MG/1
TABLET ORAL
COMMUNITY
Start: 2016-02-18 | End: 2020-05-29 | Stop reason: ALTCHOICE

## 2019-11-12 RX ORDER — ONDANSETRON 4 MG/1
TABLET, ORALLY DISINTEGRATING ORAL
COMMUNITY
Start: 2016-06-26 | End: 2020-09-02 | Stop reason: CLARIF

## 2019-11-12 RX ORDER — ALBUTEROL SULFATE 2.5 MG/3ML
SOLUTION RESPIRATORY (INHALATION)
COMMUNITY
Start: 2014-09-17 | End: 2019-11-12 | Stop reason: SDUPTHER

## 2019-11-12 RX ORDER — AZITHROMYCIN 250 MG/1
TABLET, FILM COATED ORAL
Qty: 6 TABLET | Refills: 0 | Status: SHIPPED | OUTPATIENT
Start: 2019-11-12 | End: 2019-11-16

## 2019-11-12 RX ORDER — TRAMADOL HYDROCHLORIDE 50 MG/1
TABLET ORAL
COMMUNITY
Start: 2016-06-27 | End: 2019-12-04

## 2019-11-12 RX ORDER — PREDNISONE 50 MG/1
50 TABLET ORAL DAILY
Refills: 0 | COMMUNITY
Start: 2019-08-21 | End: 2020-05-29 | Stop reason: ALTCHOICE

## 2019-11-12 RX ORDER — FLUTICASONE PROPIONATE AND SALMETEROL 113; 14 UG/1; UG/1
1 POWDER, METERED RESPIRATORY (INHALATION) 2 TIMES DAILY
Refills: 2 | COMMUNITY
Start: 2019-08-21 | End: 2019-11-27

## 2019-11-12 RX ORDER — PREDNISONE 20 MG/1
40 TABLET ORAL DAILY
Qty: 10 TABLET | Refills: 0 | Status: SHIPPED | OUTPATIENT
Start: 2019-11-12 | End: 2019-11-12

## 2019-11-12 RX ORDER — ALBUTEROL SULFATE 90 UG/1
2 AEROSOL, METERED RESPIRATORY (INHALATION) ONCE
Status: COMPLETED | OUTPATIENT
Start: 2019-11-12 | End: 2019-11-12

## 2019-11-12 RX ORDER — ALBUTEROL SULFATE 90 UG/1
AEROSOL, METERED RESPIRATORY (INHALATION)
COMMUNITY
Start: 2014-10-14 | End: 2019-11-12 | Stop reason: SDUPTHER

## 2019-11-12 RX ADMIN — ALBUTEROL SULFATE 2.5 MG: 2.5 SOLUTION RESPIRATORY (INHALATION) at 19:03

## 2019-11-12 RX ADMIN — PREDNISONE 50 MG: 20 TABLET ORAL at 22:30

## 2019-11-12 RX ADMIN — ALBUTEROL SULFATE 2 PUFF: 90 AEROSOL, METERED RESPIRATORY (INHALATION) at 22:30

## 2019-11-12 RX ADMIN — AZITHROMYCIN 500 MG: 250 TABLET, FILM COATED ORAL at 22:31

## 2019-11-13 ENCOUNTER — TELEPHONE (OUTPATIENT)
Dept: FAMILY MEDICINE CLINIC | Facility: CLINIC | Age: 39
End: 2019-11-13

## 2019-11-13 NOTE — TELEPHONE ENCOUNTER
Patient is calling in regarding receiving letter about going over test results  Patient can only do Wednesdays however provider isn't in on that day  Only wants to see/ speak with Dr Lizzie Johnston  Wondering if he could speak with her on the phone but she can only talk on a Wednesday

## 2019-11-13 NOTE — ED PROVIDER NOTES
History  Chief Complaint   Patient presents with    Shortness of Breath     pt  has hx of copd and was self medicating with left over prednisone x 3 days with no relief, pt  also using nebulizer, increased sob and cold symptoms, cough, body aches, headaches, pt  was seen at urgent care [facility] and was given albuterol treatment and sent to ed for further eval     This is a 51-year-old female with a past medical history of COPD, hypertension, hypothyroidism, and asthma who presents to the emergency department this evening with cough and nasal congestion for the last two and half weeks  Patient was initially trying to treat the symptoms herself with albuterol nebulizers and her own prednisone and took 50 mg, 50 mg, and 40 mg over three days but states that she ran out  After work today she decided to go to Urgent Care to see if she can get an antibiotic and more prednisone but on exam at her urgent care she had some abnormal breath sounds and was given an albuterol nebulizer 2 5 mg and sent to the ER for further evaluation  Patient denies any fevers, chills, nausea, vomiting, diarrhea, constipation, chest pain, shortness of breath, abdominal pain, or any weakness numbness, or tingling  Patient states that she does have an increase in sputum production and it is occasionally yellow  Patient smokes about a pack cigarettes per day  Denies any excessive alcohol or drug use  Prior to Admission Medications   Prescriptions Last Dose Informant Patient Reported? Taking?    EPINEPHrine (EPIPEN) 0 3 mg/0 3 mL SOAJ   No No   Sig: Inject 0 3 mL (0 3 mg total) into a muscle once for 1 dose   Montelukast Sodium (SINGULAIR PO)   Yes No   Sig: Take by mouth   albuterol (2 5 mg/3 mL) 0 083 % nebulizer solution   Yes No   Sig: Inhale 1 each every 6 (six) hours as needed   albuterol (VENTOLIN HFA) 90 mcg/act inhaler   Yes No   budesonide-formoterol (SYMBICORT) 160-4 5 mcg/act inhaler   Yes No   dicyclomine (BENTYL) 20 mg tablet   Yes No   Sig: Take 20 mg by mouth every 6 (six) hours as needed   diphenhydrAMINE (BENADRYL) 25 mg tablet   No No   Sig: Take 2 tablets (50 mg total) by mouth every 8 (eight) hours as needed for itching   fluticasone-salmeterol (AIRDUO RESPICLICK) 225-13 mcg/act dry powder inhaler   Yes No   Sig: Inhale 1 puff 2 (two) times a day   ibuprofen (MOTRIN) 600 mg tablet   Yes No   Sig: Take by mouth   naproxen sodium (ANAPROX) 550 mg tablet   Yes No   Sig: Take by mouth   ondansetron (ZOFRAN-ODT) 4 mg disintegrating tablet   Yes No   Sig: Take by mouth   predniSONE 10 mg tablet   Yes No   Sig: Take by mouth   predniSONE 50 mg tablet   Yes No   Sig: Take 50 mg by mouth daily With food   prochlorperazine (COMPAZINE) 10 mg tablet   Yes No   Sig: Take 10 mg by mouth 3 (three) times a day   thyroid (ARMOUR THYROID) 60 MG tablet   Yes No   Sig: Take by mouth   traMADol (ULTRAM) 50 mg tablet   Yes No   Sig: Take by mouth      Facility-Administered Medications Last Administration Doses Remaining   albuterol inhalation solution 2 5 mg 11/12/2019  7:03 PM 0          Past Medical History:   Diagnosis Date    Anemia     Anxiety     Arthritis     Asthma     COPD (chronic obstructive pulmonary disease) (HCC)     Coronary artery disease     GERD (gastroesophageal reflux disease)     Heart murmur     Hypertension     Hypothyroidism     Infectious viral hepatitis     Mitral valve regurgitation     [facility] [address] [facility]     Pneumonia     [facility]     herion last used 3 5 years     Tobacco abuse     Urinary tract infection        Past Surgical History:   Procedure Laterality Date    APPENDECTOMY      OVARIAN CYST REMOVAL         No family history on file  I have reviewed and agree with the history as documented      Social History     Tobacco Use    Smoking status: Current Every Day Smoker     Packs/day: 0 50     Types: Cigarettes    Smokeless tobacco: Never Used   Substance Use Topics    Alcohol use: No    Drug use: Not Currently     Comment: gel for arthrisis        Review of Systems   Constitutional: Negative for chills, fatigue and fever  HENT: Positive for congestion  Negative for rhinorrhea, sinus pressure and sore throat  Eyes: Negative for visual disturbance  Respiratory: Positive for cough  Negative for shortness of breath  Cardiovascular: Negative for chest pain  Gastrointestinal: Negative for abdominal pain, constipation, diarrhea, nausea and vomiting  Genitourinary: Negative for dysuria, frequency, hematuria and urgency  Musculoskeletal: Negative for arthralgias and myalgias  Skin: Negative for color change and rash  Neurological: Negative for dizziness, light-headedness and numbness  Physical Exam  ED Triage Vitals   Temperature Pulse Respirations Blood Pressure SpO2   11/12/19 1949 11/12/19 1949 11/12/19 1949 11/12/19 1949 11/12/19 1949   98 2 °F (36 8 °C) 81 20 134/80 97 %      Temp Source Heart Rate Source Patient Position - Orthostatic VS BP Location FiO2 (%)   11/12/19 1949 11/12/19 1949 11/12/19 1949 11/12/19 1949 --   Oral Monitor Lying Right arm       Pain Score       11/12/19 1952       No Pain             Orthostatic Vital Signs  Vitals:    11/12/19 1949 11/12/19 2000 11/12/19 2157   BP: 134/80 128/81 126/78   Pulse: 81 72 (!) 110   Patient Position - Orthostatic VS: Lying  Sitting       Physical Exam   Constitutional: She is oriented to person, place, and time  She appears well-developed and well-nourished  No distress  HENT:   Head: Normocephalic and atraumatic  Eyes: Pupils are equal, round, and reactive to light  Conjunctivae and EOM are normal  Right eye exhibits no discharge  Left eye exhibits no discharge  No scleral icterus  Neck: Normal range of motion  Neck supple  No JVD present  Cardiovascular: Normal rate, regular rhythm and normal heart sounds  Exam reveals no gallop and no friction rub  No murmur heard    Pulmonary/Chest: Effort normal and breath sounds normal  No stridor  No respiratory distress  She has no wheezes  She has no rales  Patient with normal breath sounds on exam   Abdominal: Soft  Bowel sounds are normal  She exhibits no distension  There is no tenderness  There is no guarding  Musculoskeletal: Normal range of motion  She exhibits no edema, tenderness or deformity  Neurological: She is alert and oriented to person, place, and time  No cranial nerve deficit or sensory deficit  She exhibits normal muscle tone  Skin: Skin is warm and dry  No rash noted  She is not diaphoretic  No erythema  No pallor  Psychiatric: She has a normal mood and affect  Her behavior is normal    Nursing note and vitals reviewed  ED Medications  Medications   albuterol (PROVENTIL HFA,VENTOLIN HFA) inhaler 2 puff (2 puffs Inhalation Given 11/12/19 2230)   azithromycin (ZITHROMAX) tablet 500 mg (500 mg Oral Given 11/12/19 2231)   predniSONE tablet 50 mg (50 mg Oral Given 11/12/19 2230)       Diagnostic Studies  Results Reviewed     None                 No orders to display         Procedures  Procedures        ED Course                               MDM  Number of Diagnoses or Management Options  Bronchitis:   Diagnosis management comments: Patient likely with a bronchitis and will prescribe azithromycin Z-Jose Guadalupe for her due to the increase in sputum production  Will also refill the patient's prednisone and give her a steroid burst over the next five days  Patient discharged home in good condition with instructions to follow up with the primary care physician at the [facility] in [facility] or the with the infolink number  Return to the emergency department sooner if she develops any new or otherwise concerning symptoms        Disposition  Final diagnoses:   Bronchitis     Time reflects when diagnosis was documented in both MDM as applicable and the Disposition within this note     Time User Action Codes Description Comment 11/12/2019  9:59 PM Mckenna Murdock Add [J40] Bronchitis       ED Disposition     ED Disposition Condition Date/Time Comment    Discharge Stable Tue Nov 12, 2019  9:59 PM Moshe Clay discharge to home/self care  Follow-up Information     Follow up With Specialties Details Why Contact Info Additional [address] Emergency Department Emergency Medicine  If symptoms worsen [address]  [phone]  ED, [address], [address] Internal Medicine Schedule an appointment as soon as possible for a visit   [address] [facility] [id number]  [address] [place] [address], [address] [place] [address], [place], [place], [address] [phone]    [employer]  Call   [phone]             Discharge Medication List as of 11/12/2019 10:02 PM      START taking these medications    Details   azithromycin (ZITHROMAX) 250 mg tablet Take 2 tablets today then 1 tablet daily x 4 days, Print      !! predniSONE 20 mg tablet Take 2 tablets (40 mg total) by mouth daily for 5 days, Starting Tue 11/12/2019, Until Sun 11/17/2019, Print       !! - Potential duplicate medications found  Please discuss with provider        CONTINUE these medications which have NOT CHANGED    Details   albuterol (2 5 mg/3 mL) 0 083 % nebulizer solution Inhale 1 each every 6 (six) hours as needed, Starting Wed 9/17/2014, Historical Med      albuterol (VENTOLIN HFA) 90 mcg/act inhaler Starting Wed 7/1/2015, Historical Med      budesonide-formoterol (SYMBICORT) 160-4 5 mcg/act inhaler Starting Fri 9/11/2015, Historical Med      dicyclomine (BENTYL) 20 mg tablet Take 20 mg by mouth every 6 (six) hours as needed, Starting Fri 8/23/2019, Historical Med      diphenhydrAMINE (BENADRYL) 25 mg tablet Take 2 tablets (50 mg total) by mouth every 8 (eight) hours as needed for itching, Starting Wed 9/25/2019, Print      EPINEPHrine (EPIPEN) 0 3 mg/0 3 mL SOAJ Inject 0 3 mL (0 3 mg total) into a muscle once for 1 dose, Starting Wed 9/25/2019, Print      fluticasone-salmeterol (AIRDUO RESPICLICK) 759-77 mcg/act dry powder inhaler Inhale 1 puff 2 (two) times a day, Starting Wed 8/21/2019, Historical Med      ibuprofen (MOTRIN) 600 mg tablet Take by mouth, Starting Thu 6/11/2015, Historical Med      Montelukast Sodium (SINGULAIR PO) Take by mouth, Historical Med      naproxen sodium (ANAPROX) 550 mg tablet Take by mouth, Starting Wed 3/16/2016, Historical Med      ondansetron (ZOFRAN-ODT) 4 mg disintegrating tablet Take by mouth, Starting Sun 6/26/2016, Historical Med      !! predniSONE 10 mg tablet Take by mouth, Starting Thu 2/18/2016, Historical Med      !! predniSONE 50 mg tablet Take 50 mg by mouth daily With food, Starting Wed 8/21/2019, Historical Med      prochlorperazine (COMPAZINE) 10 mg tablet Take 10 mg by mouth 3 (three) times a day, Starting Fri 8/23/2019, Historical Med      thyroid (ARMOUR THYROID) 60 MG tablet Take by mouth, Starting Tue 10/14/2014, Historical Med      traMADol (ULTRAM) 50 mg tablet Take by mouth, Starting Mon 6/27/2016, Historical Med       !! - Potential duplicate medications found  Please discuss with provider  No discharge procedures on file  ED Provider  Attending physically available and evaluated Cesar Monteiro  DORIS managed the patient along with the ED Attending      Electronically Signed by         Gabriel Fairchild MD  11/13/19 9634

## 2019-11-13 NOTE — DISCHARGE INSTRUCTIONS
Please follow up with the [facility] and/or [facility] link numbers at the contact information below in order to find a primary care physician to follow up with  Return to the emergency department sooner if you develop any new or otherwise concerning symptoms

## 2019-11-13 NOTE — PATIENT INSTRUCTIONS
Patient would like to go via ambulance to [facility] ER  Please go to the [facility] Emergency Department now for further evaluation and treatment- hospital address verified with the patient  Patient agreed to go immediately to the ED

## 2019-11-13 NOTE — PROGRESS NOTES
[facility] Now        NAME: Uriel Hudson is a 42 y o  female  : 1980    MRN: [id number]  DATE: 2019  TIME: 7:03 PM    Assessment and Plan   Shortness of breath [R06 02]  1  Shortness of breath  albuterol inhalation solution 2 5 mg    Transfer to other facility   2  Wheezing  Transfer to other facility   3  Cough  Transfer to other facility     Shortness of breath, chest tightness, failing outpatient treatment with history of COPD and asthma- has been on 4 days of 50 mgs of prednisone with no relief, has been worsening  Has been using albuterol inhalers, nebulizers as well as her daily medications with no relief  Patient Instructions     Patient would like to go via ambulance to [facility] ER  Please go to the [facility] Emergency Department now for further evaluation and treatment- hospital address verified with the patient  Patient agreed to go immediately to the ED  Chief Complaint     Chief Complaint   Patient presents with    Shortness of Breath     asthma flare - up and chest tightness and heavy for 2 weeks patient states she used her mediication     Cough         History of Present Illness       31-year-old female presents with worsening shortness of breath, chest tightness and cough over the past 2 5 weeks  Patient states it started with a sore throat and nasal congestion over 2 weeks ago then moved down to her chest the past 2 weeks  She states the cough started off dry but is now productive of clearish phlegm  States she has been using her albuterol inhaler multiple times throughout the day and then using nebulizers when she gets home at night  States she has been using her Symbicort 4 times a day as it of 2 times a day with little relief  Patient states she has also completed a 4 day course of 50 mgs of prednisone daily with no relief  Has also tried over-the-counter cough/cold medications with no relief  Patient also notes she has been feeling tired  Notes a history of asthma and COPD- seeing pulmonology  She denies any chest pain, GI/ symptoms, fevers or other complaints  Denies any pregnancy risk  Review of Systems   Review of Systems   Constitutional: Positive for fatigue  Negative for activity change, appetite change, chills and fever  HENT: Positive for congestion and rhinorrhea  Negative for ear pain, facial swelling, sinus pressure, sinus pain, sore throat, trouble swallowing and voice change  Eyes: Negative for discharge, itching and visual disturbance  Respiratory: Positive for cough, chest tightness, shortness of breath and wheezing  Cardiovascular: Negative for chest pain and palpitations  Gastrointestinal: Negative for abdominal pain, diarrhea, nausea and vomiting  Musculoskeletal: Negative for back pain and neck pain  Skin: Negative for rash  Neurological: Negative for dizziness, syncope, weakness, numbness and headaches  All other systems reviewed and are negative          Current Medications       Current Outpatient Medications:     albuterol (2 5 mg/3 mL) 0 083 % nebulizer solution, Inhale 1 each every 6 (six) hours as needed, Disp: , Rfl:     albuterol (VENTOLIN HFA) 90 mcg/act inhaler, , Disp: , Rfl:     budesonide-formoterol (SYMBICORT) 160-4 5 mcg/act inhaler, , Disp: , Rfl:     dicyclomine (BENTYL) 20 mg tablet, Take 20 mg by mouth every 6 (six) hours as needed, Disp: , Rfl: 0    diphenhydrAMINE (BENADRYL) 25 mg tablet, Take 2 tablets (50 mg total) by mouth every 8 (eight) hours as needed for itching, Disp: 20 tablet, Rfl: 0    EPINEPHrine (EPIPEN) 0 3 mg/0 3 mL SOAJ, Inject 0 3 mL (0 3 mg total) into a muscle once for 1 dose, Disp: 0 6 mL, Rfl: 0    fluticasone-salmeterol (AIRDUO RESPICLICK) 581-60 mcg/act dry powder inhaler, Inhale 1 puff 2 (two) times a day, Disp: , Rfl: 2    ibuprofen (MOTRIN) 600 mg tablet, Take by mouth, Disp: , Rfl:     Montelukast Sodium (SINGULAIR PO), Take by mouth, Disp: , Rfl:     naproxen sodium (ANAPROX) 550 mg tablet, Take by mouth, Disp: , Rfl:     ondansetron (ZOFRAN-ODT) 4 mg disintegrating tablet, Take by mouth, Disp: , Rfl:     predniSONE 10 mg tablet, Take by mouth, Disp: , Rfl:     predniSONE 50 mg tablet, Take 50 mg by mouth daily With food, Disp: , Rfl: 0    prochlorperazine (COMPAZINE) 10 mg tablet, Take 10 mg by mouth 3 (three) times a day, Disp: , Rfl: 0    traMADol (ULTRAM) 50 mg tablet, Take by mouth, Disp: , Rfl:     thyroid (ARMOUR THYROID) 60 MG tablet, Take by mouth, Disp: , Rfl:   No current facility-administered medications for this visit  Current Allergies     Allergies as of 11/12/2019 - Reviewed 11/12/2019   Allergen Reaction Noted    Ativan [lorazepam]  04/21/2017    Coconut oil  09/24/2019    Nuts  09/24/2019    Penicillins  04/24/2014    Shellfish-derived products  09/24/2015    Tegretol [carbamazepine]  04/21/2017            The following portions of the patient's history were reviewed and updated as appropriate: allergies, current medications, past family history, past medical history, past social history, past surgical history and problem list      Past Medical History:   Diagnosis Date    Anemia     Anxiety     Arthritis     Asthma     [facility] [address] [facility]     Coronary artery disease     GERD (gastroesophageal reflux disease)     Heart murmur     Hypertension     Hypothyroidism     Infectious viral hepatitis     Mitral valve regurgitation     Myocardial infarction [facility] [address] [facility]     Pneumonia     Substance abuse ([facility] [address] [facility]     herion last used 3 5 years     Tobacco abuse     Urinary tract infection        Past Surgical History:   Procedure Laterality Date    APPENDECTOMY      OVARIAN CYST REMOVAL         No family history on file  Medications have been verified          Objective   /90 (BP Location: Left arm, Patient Position: Sitting, Cuff Size: Standard)   Pulse 78   Temp 98 5 °F (36 9 °C) (Temporal)   Resp 18   Ht 5' 3" (1 6 m)   Wt 50 8 kg (112 lb)   SpO2 99%   BMI 19 84 kg/m²        Physical Exam     Physical Exam   Constitutional: She is oriented to person, place, and time  She appears well-developed and well-nourished  Appears uncomfortable  Patient is fatigued and tries to catch her breath after speaking a few words   HENT:   Right Ear: Tympanic membrane normal    Left Ear: Tympanic membrane normal    Mouth/Throat: Uvula is midline and mucous membranes are normal  No uvula swelling  Mild PND present with mildly erythematous posterior pharynx  Airway patent, handling secretions  Eyes: Pupils are equal, round, and reactive to light  Cardiovascular: Normal rate, regular rhythm and normal heart sounds  Pulmonary/Chest: Effort normal  No respiratory distress  She has decreased breath sounds (Diffusely, not pushing much air)  She has wheezes in the right upper field and the left upper field  Neurological: She is alert and oriented to person, place, and time  Psychiatric: She has a normal mood and affect  Her behavior is normal    Nursing note and vitals reviewed

## 2019-11-13 NOTE — ED ATTENDING ATTESTATION
11/12/2019  Connor Palomares DO, saw and evaluated the patient  I have discussed the patient with the resident/non-physician practitioner and agree with the resident's/non-physician practitioner's findings, Plan of Care, and MDM as documented in the resident's/non-physician practitioner's note, except where noted  All available labs and Radiology studies were reviewed  I was present for key portions of any procedure(s) performed by the resident/non-physician practitioner and I was immediately available to provide assistance  At this point I agree with the current assessment done in the Emergency Department  I have conducted an independent evaluation of this patient a history and physical is as follows:    42 yof with COPD presents with cough, sore throat and nasal congestion for two weeks  Pt seen at [facility] and given neb  Pt also tried self-treating at home with prednisone  Past Medical History:   Diagnosis Date    Anemia     Anxiety     Arthritis     Asthma     [facility]) [facility] [address] [facility]     Coronary artery disease     GERD (gastroesophageal reflux disease)     Heart murmur     Hypertension     Hypothyroidism     Infectious viral hepatitis     Mitral valve regurgitation     [facility]     Pneumonia     Substance [facility]     herion last used 3 5 years     Tobacco abuse     Urinary tract infection      /81   Pulse 72   Temp 98 2 °F (36 8 °C) (Oral)   Resp 22   Ht 5' 3" (1 6 m)   Wt 51 3 kg (113 lb)   SpO2 97%   BMI 20 02 kg/m²    NAD, A&Ox4, CTA, RRR, abd soft/NT, ext NT    Zpak, prednisone, albuterol inhaler for COPD exacerbation  DC and f/u  Pt happy with care           ED Course         Critical Care Time  Procedures

## 2019-11-22 NOTE — TELEPHONE ENCOUNTER
Called patient back to answer her questions and left a voice mail  No answer   Left message to call me back, before I go on break

## 2019-11-27 ENCOUNTER — OFFICE VISIT (OUTPATIENT)
Dept: FAMILY MEDICINE CLINIC | Facility: CLINIC | Age: 39
End: 2019-11-27

## 2019-11-27 ENCOUNTER — TELEPHONE (OUTPATIENT)
Dept: FAMILY MEDICINE CLINIC | Facility: CLINIC | Age: 39
End: 2019-11-27

## 2019-11-27 ENCOUNTER — APPOINTMENT (OUTPATIENT)
Dept: LAB | Facility: CLINIC | Age: 39
End: 2019-11-27
Payer: COMMERCIAL

## 2019-11-27 VITALS
RESPIRATION RATE: 16 BRPM | BODY MASS INDEX: 20.55 KG/M2 | SYSTOLIC BLOOD PRESSURE: 110 MMHG | WEIGHT: 116 LBS | HEART RATE: 83 BPM | OXYGEN SATURATION: 98 % | TEMPERATURE: 98 F | DIASTOLIC BLOOD PRESSURE: 86 MMHG

## 2019-11-27 DIAGNOSIS — T78.40XS ACUTE ALLERGIC REACTION, SEQUELA: ICD-10-CM

## 2019-11-27 DIAGNOSIS — Z00.00 HEALTHCARE MAINTENANCE: ICD-10-CM

## 2019-11-27 DIAGNOSIS — R74.8 LOW SERUM ALKALINE PHOSPHATASE: ICD-10-CM

## 2019-11-27 DIAGNOSIS — G89.29 CHRONIC GENERALIZED PAIN: ICD-10-CM

## 2019-11-27 DIAGNOSIS — J45.40 MODERATE PERSISTENT ASTHMA, UNSPECIFIED WHETHER COMPLICATED: ICD-10-CM

## 2019-11-27 DIAGNOSIS — R11.2 NAUSEA AND VOMITING, INTRACTABILITY OF VOMITING NOT SPECIFIED, UNSPECIFIED VOMITING TYPE: ICD-10-CM

## 2019-11-27 DIAGNOSIS — Z11.4 SCREENING FOR HIV (HUMAN IMMUNODEFICIENCY VIRUS): ICD-10-CM

## 2019-11-27 DIAGNOSIS — J02.9 VIRAL PHARYNGITIS: ICD-10-CM

## 2019-11-27 DIAGNOSIS — Z72.0 TOBACCO ABUSE: ICD-10-CM

## 2019-11-27 DIAGNOSIS — R52 CHRONIC GENERALIZED PAIN: ICD-10-CM

## 2019-11-27 DIAGNOSIS — J44.9 CHRONIC OBSTRUCTIVE PULMONARY DISEASE, UNSPECIFIED COPD TYPE (HCC): Primary | ICD-10-CM

## 2019-11-27 DIAGNOSIS — K29.70 GASTRITIS WITHOUT BLEEDING, UNSPECIFIED CHRONICITY, UNSPECIFIED GASTRITIS TYPE: ICD-10-CM

## 2019-11-27 DIAGNOSIS — J44.9 CHRONIC OBSTRUCTIVE PULMONARY DISEASE, UNSPECIFIED COPD TYPE (HCC): ICD-10-CM

## 2019-11-27 LAB
ALBUMIN SERPL BCP-MCNC: 4.2 G/DL (ref 3.5–5)
ALP SERPL-CCNC: 31 U/L (ref 46–116)
ALT SERPL W P-5'-P-CCNC: 53 U/L (ref 12–78)
ANION GAP SERPL CALCULATED.3IONS-SCNC: 3 MMOL/L (ref 4–13)
AST SERPL W P-5'-P-CCNC: 35 U/L (ref 5–45)
BASOPHILS # BLD AUTO: 0.06 THOUSANDS/ΜL (ref 0–0.1)
BASOPHILS NFR BLD AUTO: 1 % (ref 0–1)
BILIRUB SERPL-MCNC: 0.63 MG/DL (ref 0.2–1)
BUN SERPL-MCNC: 11 MG/DL (ref 5–25)
CALCIUM SERPL-MCNC: 9 MG/DL (ref 8.3–10.1)
CHLORIDE SERPL-SCNC: 109 MMOL/L (ref 100–108)
CHOLEST SERPL-MCNC: 176 MG/DL (ref 50–200)
CO2 SERPL-SCNC: 29 MMOL/L (ref 21–32)
CREAT SERPL-MCNC: 0.79 MG/DL (ref 0.6–1.3)
EOSINOPHIL # BLD AUTO: 0.14 THOUSAND/ΜL (ref 0–0.61)
EOSINOPHIL NFR BLD AUTO: 2 % (ref 0–6)
ERYTHROCYTE [DISTWIDTH] IN BLOOD BY AUTOMATED COUNT: 13.1 % (ref 11.6–15.1)
EST. AVERAGE GLUCOSE BLD GHB EST-MCNC: 105 MG/DL
GFR SERPL CREATININE-BSD FRML MDRD: 96 ML/MIN/1.73SQ M
GLUCOSE P FAST SERPL-MCNC: 86 MG/DL (ref 65–99)
HBA1C MFR BLD: 5.3 % (ref 4.2–6.3)
HCT VFR BLD AUTO: 43.8 % (ref 34.8–46.1)
HDLC SERPL-MCNC: 96 MG/DL
HGB BLD-MCNC: 14.1 G/DL (ref 11.5–15.4)
IMM GRANULOCYTES # BLD AUTO: 0.04 THOUSAND/UL (ref 0–0.2)
IMM GRANULOCYTES NFR BLD AUTO: 0 % (ref 0–2)
LDLC SERPL CALC-MCNC: 65 MG/DL (ref 0–100)
LYMPHOCYTES # BLD AUTO: 1.93 THOUSANDS/ΜL (ref 0.6–4.47)
LYMPHOCYTES NFR BLD AUTO: 21 % (ref 14–44)
MCH RBC QN AUTO: 30.5 PG (ref 26.8–34.3)
MCHC RBC AUTO-ENTMCNC: 32.2 G/DL (ref 31.4–37.4)
MCV RBC AUTO: 95 FL (ref 82–98)
MONOCYTES # BLD AUTO: 0.46 THOUSAND/ΜL (ref 0.17–1.22)
MONOCYTES NFR BLD AUTO: 5 % (ref 4–12)
NEUTROPHILS # BLD AUTO: 6.41 THOUSANDS/ΜL (ref 1.85–7.62)
NEUTS SEG NFR BLD AUTO: 71 % (ref 43–75)
NONHDLC SERPL-MCNC: 80 MG/DL
NRBC BLD AUTO-RTO: 0 /100 WBCS
PLATELET # BLD AUTO: 229 THOUSANDS/UL (ref 149–390)
PMV BLD AUTO: 10.5 FL (ref 8.9–12.7)
POTASSIUM SERPL-SCNC: 4.3 MMOL/L (ref 3.5–5.3)
PROT SERPL-MCNC: 7 G/DL (ref 6.4–8.2)
RBC # BLD AUTO: 4.63 MILLION/UL (ref 3.81–5.12)
SODIUM SERPL-SCNC: 141 MMOL/L (ref 136–145)
TRIGL SERPL-MCNC: 77 MG/DL
WBC # BLD AUTO: 9.04 THOUSAND/UL (ref 4.31–10.16)

## 2019-11-27 PROCEDURE — 36415 COLL VENOUS BLD VENIPUNCTURE: CPT

## 2019-11-27 PROCEDURE — 87389 HIV-1 AG W/HIV-1&-2 AB AG IA: CPT

## 2019-11-27 PROCEDURE — 80061 LIPID PANEL: CPT

## 2019-11-27 PROCEDURE — 80053 COMPREHEN METABOLIC PANEL: CPT

## 2019-11-27 PROCEDURE — 85025 COMPLETE CBC W/AUTO DIFF WBC: CPT

## 2019-11-27 PROCEDURE — 83036 HEMOGLOBIN GLYCOSYLATED A1C: CPT

## 2019-11-27 PROCEDURE — 82103 ALPHA-1-ANTITRYPSIN TOTAL: CPT

## 2019-11-27 PROCEDURE — 84100 ASSAY OF PHOSPHORUS: CPT

## 2019-11-27 PROCEDURE — 99215 OFFICE O/P EST HI 40 MIN: CPT | Performed by: NURSE PRACTITIONER

## 2019-11-27 RX ORDER — VARENICLINE TARTRATE 25 MG
KIT ORAL
Qty: 53 TABLET | Refills: 0 | Status: SHIPPED | OUTPATIENT
Start: 2019-11-27 | End: 2020-05-08 | Stop reason: SDUPTHER

## 2019-11-27 RX ORDER — MONTELUKAST SODIUM 10 MG/1
10 TABLET ORAL
Qty: 30 TABLET | Refills: 5 | Status: SHIPPED | OUTPATIENT
Start: 2019-11-27 | End: 2020-05-08 | Stop reason: SDUPTHER

## 2019-11-27 RX ORDER — DICYCLOMINE HCL 20 MG
20 TABLET ORAL EVERY 6 HOURS PRN
Qty: 30 TABLET | Refills: 1 | Status: SHIPPED | OUTPATIENT
Start: 2019-11-27 | End: 2020-12-02

## 2019-11-27 RX ORDER — OMEPRAZOLE 40 MG/1
40 CAPSULE, DELAYED RELEASE ORAL DAILY
Qty: 30 CAPSULE | Refills: 5 | Status: SHIPPED | OUTPATIENT
Start: 2019-11-27 | End: 2020-05-08 | Stop reason: SDUPTHER

## 2019-11-27 RX ORDER — GABAPENTIN 100 MG/1
100 CAPSULE ORAL 3 TIMES DAILY
Qty: 90 CAPSULE | Refills: 5 | Status: SHIPPED | OUTPATIENT
Start: 2019-11-27 | End: 2019-12-04

## 2019-11-27 RX ORDER — EPINEPHRINE 0.3 MG/.3ML
0.3 INJECTION SUBCUTANEOUS ONCE
Qty: 0.6 ML | Refills: 0 | Status: SHIPPED | OUTPATIENT
Start: 2019-11-27 | End: 2020-09-02 | Stop reason: CLARIF

## 2019-11-27 RX ORDER — PROCHLORPERAZINE MALEATE 10 MG
10 TABLET ORAL 3 TIMES DAILY
Qty: 30 TABLET | Refills: 0 | Status: SHIPPED | OUTPATIENT
Start: 2019-11-27 | End: 2020-12-03

## 2019-11-27 NOTE — PATIENT INSTRUCTIONS
COPD, Ambulatory Care   GENERAL INFORMATION:   COPD (chronic obstructive pulmonary disease)  is a lung disease that makes it hard for you to breathe  COPD is usually a result of lung damage caused by years of irritation and inflammation  COPD limits air flow in your lungs  Smoking, pollution, genetics, or a history of lung infections can increase your risk for COPD  Common symptoms include the following:   · Shortness of breath     · A dry cough     · Coughing fits that bring up mucus from your lungs     · Wheezing and chest tightness  Seek immediate care for the following symptoms:   · Confusion, dizziness, or lightheadedness    · Red, swollen, warm arm or leg    · Shortness of breath or chest pain    · Coughing up blood  Treatment for COPD  may include medicines to help decrease swelling and inflammation in your lungs  Medicines may also help open your airways or treat and infection  You may need pulmonary rehabilitation to help you manage your symptoms and improve your quality of life  You may need extra oxygen to help you breathe easier  Manage COPD and prevent an exacerbation:   · Do not smoke, and avoid others who smoke  If you smoke, it is never too late to quit  You may have fewer exacerbations  Ask for information about medicines and support programs that can help you quit  · Avoid triggers that make your symptoms worse  Cold weather and sudden temperature changes can trigger an exacerbation  Fumes from cars and chemicals, air pollution, and perfume can also increase your symptoms  · Use pursed-lip breathing when you feel short of breath  Take a deep breath in through your nose  Slowly breathe out through your mouth with your lips pursed for twice as long as you inhaled  You can also practice this breathing pattern while you bend, lift, climb stairs, or exercise  Pursed-lip breathing slows down your breathing and helps move more air in and out of your lungs             · Exercise for at least 20 minutes each day  Exercise can help increase your energy and decrease shortness of breath  Ask about the best exercise plan for you  · Prevent infections that can be dangerous when you have COPD  Get a flu vaccine every year as soon as it becomes available  Ask if you should also get other vaccines, such as those given to prevent pneumonia and tetanus  Avoid people who are sick, and wash your hands often  Follow up with your healthcare provider as directed:  Write down your questions so you remember to ask them during your visits  CARE AGREEMENT:   You have the right to help plan your care  Learn about your health condition and how it may be treated  Discuss treatment options with your caregivers to decide what care you want to receive  You always have the right to refuse treatment  The above information is an  only  It is not intended as medical advice for individual conditions or treatments  Talk to your doctor, nurse or pharmacist before following any medical regimen to see if it is safe and effective for you  © 2014 [address] is for End User's use only and may not be sold, redistributed or otherwise used for commercial purposes  All illustrations and images included in CareNotes® are the copyrighted property of [employer]  or Abhinav Nagel

## 2019-11-27 NOTE — PROGRESS NOTES
Assessment/Plan:    COPD/Asthma  -With obstruction now moderate/severe and overlapping asthma that is poorly controlled will refer to pulmonology for optimal symptom control   -Reordered Singulair and discussed the importance of regular use to control allergies and improve respiratory symptoms  -Insurance does not cover Symbicort ; sent [facility] instead  -Ordered Spiriva for symptom relief   -Will check AAT level to rule out alpha-1 antitrypsin deficiency     Tobacco Abuse  -Patient is ready to quit  -Ordered Chantix kit  -Discussed proper use and possible side effects    Gastritis; Nausea/Vomiting  -Ongoing issue; recommended that patient discuss with gastroenterology when she goes for her appointment in January 2020  -Ordered Prilosec daily and refilled PRN's (Compazine, Bentyl)  -Ordered H  Pylori antigen; stool to rule out H  Pylori infection   -Encouraged to avoid NSAIDS and to keep a log of foods consumed, symptoms, and bowel movements    Chronic Generalized Pain  -Was taking Tramadol; will not reorder  -Start gabapentin 100 mg TID    Viral Pharyngitis  -Discussed with patient that an antibiotic is not necessary   -Encouraged to use saltwater gargles, humidifier at night, and vaseline to the nares  -Informed patient to call office if she develops fever or if symptoms don't improve over next 2 weeks    Healthcare Maintenance/ Screening for HIV  -Labs ordered to check cholesterol levels, screen for diabetes, and screen for HIV    Reordered Epi Pen for patient to have in case of anaphylactic reaction     Return in about 3 months (around 2/27/2020) for Follow up   Kalee Solano was seen today for follow-up and nicotine dependence  Diagnoses and all orders for this visit:    Tobacco abuse  -     varenicline (CHANTIX COLLIN) 0 5 MG X 11 & 1 MG X 42 tablet;  Take one 0 5mg tab by mouth 1x daily for 3 days, then increase to one 0 5mg tab 2x daily for 3 days, then increase to one 1mg tab 2x daily    Chronic obstructive pulmonary disease, unspecified COPD type ([facility] [address] )  -     Ambulatory referral to Pulmonology; Future  -     Alpha-1-antitrypsin; Future    Moderate persistent asthma, unspecified whether complicated  -     tiotropium (SPIRIVA RESPIMAT) 1 25 MCG/ACT AERS inhaler; Inhale 2 puffs daily  -     montelukast (SINGULAIR) 10 mg tablet; Take 1 tablet (10 mg total) by mouth daily at bedtime  -     Ambulatory referral to Pulmonology; Future  -     Alpha-1-antitrypsin; Future  -     fluticasone-salmeterol (ADVAIR, WIXELA) 250-50 mcg/dose inhaler; Inhale 1 puff 2 (two) times a day Rinse mouth after use  Nausea and vomiting, intractability of vomiting not specified, unspecified vomiting type    Gastritis without bleeding, unspecified chronicity, unspecified gastritis type  -     prochlorperazine (COMPAZINE) 10 mg tablet; Take 1 tablet (10 mg total) by mouth 3 (three) times a day  -     dicyclomine (BENTYL) 20 mg tablet; Take 1 tablet (20 mg total) by mouth every 6 (six) hours as needed (stomach cramps)  -     H  pylori antigen, stool; Future  -     omeprazole (PriLOSEC) 40 MG capsule; Take 1 capsule (40 mg total) by mouth daily    Healthcare maintenance  -     CBC and differential; Future  -     Comprehensive metabolic panel; Future  -     Hemoglobin A1C; Future  -     Lipid panel; Future    Screening for HIV (human immunodeficiency virus)  -     Human Immunodeficiency Virus 1/2 Antigen / Antibody ( Fourth Generation) with Reflex Testing; Future    Acute allergic reaction, sequela  -     EPINEPHrine (EPIPEN) 0 3 mg/0 3 mL SOAJ; Inject 0 3 mL (0 3 mg total) into a muscle once for 1 dose    Chronic generalized pain  -     gabapentin (NEURONTIN) 100 mg capsule; Take 1 capsule (100 mg total) by mouth 3 (three) times a day        Patient Instructions     COPD, Ambulatory Care   GENERAL INFORMATION:   COPD (chronic obstructive pulmonary disease)  is a lung disease that makes it hard for you to breathe   COPD is usually a result of lung damage caused by years of irritation and inflammation  COPD limits air flow in your lungs  Smoking, pollution, genetics, or a history of lung infections can increase your risk for COPD  Common symptoms include the following:   · Shortness of breath     · A dry cough     · Coughing fits that bring up mucus from your lungs     · Wheezing and chest tightness  Seek immediate care for the following symptoms:   · Confusion, dizziness, or lightheadedness    · Red, swollen, warm arm or leg    · Shortness of breath or chest pain    · Coughing up blood  Treatment for COPD  may include medicines to help decrease swelling and inflammation in your lungs  Medicines may also help open your airways or treat and infection  You may need pulmonary rehabilitation to help you manage your symptoms and improve your quality of life  You may need extra oxygen to help you breathe easier  Manage COPD and prevent an exacerbation:   · Do not smoke, and avoid others who smoke  If you smoke, it is never too late to quit  You may have fewer exacerbations  Ask for information about medicines and support programs that can help you quit  · Avoid triggers that make your symptoms worse  Cold weather and sudden temperature changes can trigger an exacerbation  Fumes from cars and chemicals, air pollution, and perfume can also increase your symptoms  · Use pursed-lip breathing when you feel short of breath  Take a deep breath in through your nose  Slowly breathe out through your mouth with your lips pursed for twice as long as you inhaled  You can also practice this breathing pattern while you bend, lift, climb stairs, or exercise  Pursed-lip breathing slows down your breathing and helps move more air in and out of your lungs  · Exercise for at least 20 minutes each day  Exercise can help increase your energy and decrease shortness of breath  Ask about the best exercise plan for you       · Prevent infections that can be dangerous when you have COPD  Get a flu vaccine every year as soon as it becomes available  Ask if you should also get other vaccines, such as those given to prevent pneumonia and tetanus  Avoid people who are sick, and wash your hands often  Follow up with your healthcare provider as directed:  Write down your questions so you remember to ask them during your visits  CARE AGREEMENT:   You have the right to help plan your care  Learn about your health condition and how it may be treated  Discuss treatment options with your caregivers to decide what care you want to receive  You always have the right to refuse treatment  The above information is an  only  It is not intended as medical advice for individual conditions or treatments  Talk to your doctor, nurse or pharmacist before following any medical regimen to see if it is safe and effective for you  © 2014 [address] is for End User's use only and may not be sold, redistributed or otherwise used for commercial purposes  All illustrations and images included in CareNotes® are the copyrighted property of A D A M , Inc  or Abhinav Nagel  Subjective:     Elizabethann Goldmann is a 42 y o  female who  has a past medical history of Anemia, Anxiety, Arthritis, Asthma, COPD (chronic obstructive pulmonary disease) ([address] ), Coronary artery disease, GERD (gastroesophageal reflux disease), Heart murmur, Hypertension, Hypothyroidism, Infectious viral hepatitis, Mitral valve regurgitation, Myocardial infarction ([address] ), Pneumonia, Substance abuse ([address] ), Tobacco abuse, and Urinary tract infection  who presented to the office today for follow up of PFT results  HPI  Patient mentioned that she is here to review her PFT results and to discuss a few concerns  She was ordered PFT's by another provider and was unable to follow up with that provider due to scheduling conflicts   She reports that she was diagnosed with asthma when she was 11years old and then with COPD in her 21's  She admits that she has been smoking since she was 17years old  She states that she is using her rescue inhaler at least daily, sometimes 2-3 times per day  She is not taking anything to control her allergies  She reports that she has been using the Symbicort but it is no longer covered by her insurance and is over $200 per inhaler  Pulmonary Functions Testing Results:  PFT results from 10/2/2019:  Results:  FEV1/FVC Ratio: 52 %  Forced Vital Capacity: 3 28 L    85 % predicted  FEV1: 1 70 L     53 % predicted  Lung volumes by body plethysmography:   Total Lung Capacity 127 % predicted   Residual volume 254 % predicted  DLCO corrected for patients hemoglobin level: 69 %  Interpretation:     · Moderately severe obstructive airflow defect   · There is significant airway response with the administration of bronchodilator per ATS standards  · Increased lung volumes indicative of air trapping and hyperinflation  · Mildly decreased diffusion capacity      The patient reports that she has been experiencing ongoing stomach issues  She states that she often has cramping and pain after eating  She frequently experiences nausea and vomiting  She reports that her stools are usually either soft or she is constipated  She states that she notices greasy foods make her symptoms worse but lately there are more foods triggering her symptoms (such as eggs)  She takes over the counter Maalox and was intermittently taking Prilosec  It provides minimal relief  The patient will be following with gastroenterology in January 2020 to follow up for a recent positive hepatitis C screening  The patient reports today that she is interested in quitting smoking  She understands that by her continuing to smoke she is accelerating the progression of her respiratory symptoms  Discussed and educated on various pharmacotherapy options to assist with tobacco cessation       Patient reports that she has been on tramadol for a significant amount of time  She states that she is on this medication for chronic generalized pain  She states today, however, that she does not want to be on this medication as she was just informed that this medication is considered a narcotic  Discussed trialing Cymbalta; patient declined due to her mother having a bad experience with the medication  Discussed gabapentin; patient is willing to be started on this medication and have tramadol stopped  The patient reports that she has been having a "scratchy" throat with voice change for the past few days  She denies fevers  Denies congestion  Denies sputum production  No chest pain or increased shortness of breath  She denies ear pain, sinus pain, and nasal discharge  She has not tried anything over the counter for relief  She is following with [facility] and is due for an exam and PAP smear  She states that she will schedule  She has hypertension in her chart, she is not on any medications for that and her blood pressure today is 110/86  She has hypothyroid in her chart but her last TSH and T3 levels were normal; she is not taking any medication for this either  She does have a history of anaphylactic allergic reaction to nuts, coconut oil, ativan, penicillin, and shellfish  I have recommended that this patient have an influenza vaccination but she declines at this time  I have discussed the risks and benefits of this vaccination  with her  The patient verbalizes understanding  Tobacco Cessation Counseling: Tobacco cessation counseling was provided  The patient is sincerely urged to quit consumption of tobacco  She is ready to quit tobacco  Medication options and side effects of medication discussed  Patient agreed to medication  Varenicline (chantix) was prescribed         The following portions of the patient's history were reviewed and updated as appropriate:   She  has a past medical history of Anemia, Anxiety, Arthritis, Asthma, COPD (chronic obstructive pulmonary disease) ([facility] ), Coronary artery disease, GERD (gastroesophageal reflux disease), Heart murmur, Hypertension, Hypothyroidism, Infectious viral hepatitis, Mitral valve regurgitation, Myocardial infarction ([facility] ), Pneumonia, [facility] Tobacco abuse, and Urinary tract infection  She   Patient Active Problem List    Diagnosis Date Noted    Anxiety 09/13/2019    Occasional tremors 09/13/2019    Tachycardia 09/13/2019    Weight loss 09/13/2019    Nausea & vomiting 04/21/2017    Mitral valve regurgitation     Hypothyroidism     Asthma     Tobacco abuse      She  has a past surgical history that includes Ovarian cyst removal and Appendectomy  Her family history is not on file  She  reports that she has been smoking cigarettes  She has been smoking about 0 50 packs per day  She has never used smokeless tobacco  She reports that she has current or past drug history  She reports that she does not drink alcohol    Current Outpatient Medications   Medication Sig Dispense Refill    albuterol (2 5 mg/3 mL) 0 083 % nebulizer solution Inhale 1 each every 6 (six) hours as needed      albuterol (VENTOLIN HFA) 90 mcg/act inhaler       dicyclomine (BENTYL) 20 mg tablet Take 1 tablet (20 mg total) by mouth every 6 (six) hours as needed (stomach cramps) 30 tablet 1    diphenhydrAMINE (BENADRYL) 25 mg tablet Take 2 tablets (50 mg total) by mouth every 8 (eight) hours as needed for itching 20 tablet 0    ibuprofen (MOTRIN) 600 mg tablet Take by mouth      naproxen sodium (ANAPROX) 550 mg tablet Take by mouth      ondansetron (ZOFRAN-ODT) 4 mg disintegrating tablet Take by mouth      predniSONE 10 mg tablet Take by mouth      predniSONE 50 mg tablet Take 50 mg by mouth daily With food  0    prochlorperazine (COMPAZINE) 10 mg tablet Take 1 tablet (10 mg total) by mouth 3 (three) times a day 30 tablet 0    traMADol (ULTRAM) 50 mg tablet Take by mouth      EPINEPHrine (EPIPEN) 0 3 mg/0 3 mL SOAJ Inject 0 3 mL (0 3 mg total) into a muscle once for 1 dose 0 6 mL 0    fluticasone-salmeterol (ADVAIR, WIXELA) 250-50 mcg/dose inhaler Inhale 1 puff 2 (two) times a day Rinse mouth after use  1 Inhaler 5    gabapentin (NEURONTIN) 100 mg capsule Take 1 capsule (100 mg total) by mouth 3 (three) times a day 90 capsule 5    montelukast (SINGULAIR) 10 mg tablet Take 1 tablet (10 mg total) by mouth daily at bedtime 30 tablet 5    omeprazole (PriLOSEC) 40 MG capsule Take 1 capsule (40 mg total) by mouth daily 30 capsule 5    tiotropium (SPIRIVA RESPIMAT) 1 25 MCG/ACT AERS inhaler Inhale 2 puffs daily 1 Inhaler 5    varenicline (CHANTIX COLLIN) 0 5 MG X 11 & 1 MG X 42 tablet Take one 0 5mg tab by mouth 1x daily for 3 days, then increase to one 0 5mg tab 2x daily for 3 days, then increase to one 1mg tab 2x daily 53 tablet 0     No current facility-administered medications for this visit        Current Outpatient Medications on File Prior to Visit   Medication Sig    albuterol (2 5 mg/3 mL) 0 083 % nebulizer solution Inhale 1 each every 6 (six) hours as needed    albuterol (VENTOLIN HFA) 90 mcg/act inhaler     diphenhydrAMINE (BENADRYL) 25 mg tablet Take 2 tablets (50 mg total) by mouth every 8 (eight) hours as needed for itching    ibuprofen (MOTRIN) 600 mg tablet Take by mouth    naproxen sodium (ANAPROX) 550 mg tablet Take by mouth    ondansetron (ZOFRAN-ODT) 4 mg disintegrating tablet Take by mouth    predniSONE 10 mg tablet Take by mouth    predniSONE 50 mg tablet Take 50 mg by mouth daily With food    traMADol (ULTRAM) 50 mg tablet Take by mouth    [DISCONTINUED] budesonide-formoterol (SYMBICORT) 160-4 5 mcg/act inhaler     [DISCONTINUED] dicyclomine (BENTYL) 20 mg tablet Take 20 mg by mouth every 6 (six) hours as needed    [DISCONTINUED] prochlorperazine (COMPAZINE) 10 mg tablet Take 10 mg by mouth 3 (three) times a day    [DISCONTINUED] EPINEPHrine (EPIPEN) 0 3 mg/0 3 mL SOAJ Inject 0 3 mL (0 3 mg total) into a muscle once for 1 dose    [DISCONTINUED] fluticasone-salmeterol (AIRDUO RESPICLICK) 878-33 mcg/act dry powder inhaler Inhale 1 puff 2 (two) times a day    [DISCONTINUED] Montelukast Sodium (SINGULAIR PO) Take by mouth    [DISCONTINUED] thyroid (ARMOUR THYROID) 60 MG tablet Take by mouth     No current facility-administered medications on file prior to visit  She is allergic to ativan [lorazepam]; coconut oil; nuts; penicillins; shellfish-derived products; and tegretol [carbamazepine]       Current Outpatient Medications on File Prior to Visit   Medication Sig Dispense Refill    albuterol (2 5 mg/3 mL) 0 083 % nebulizer solution Inhale 1 each every 6 (six) hours as needed      albuterol (VENTOLIN HFA) 90 mcg/act inhaler       diphenhydrAMINE (BENADRYL) 25 mg tablet Take 2 tablets (50 mg total) by mouth every 8 (eight) hours as needed for itching 20 tablet 0    ibuprofen (MOTRIN) 600 mg tablet Take by mouth      naproxen sodium (ANAPROX) 550 mg tablet Take by mouth      ondansetron (ZOFRAN-ODT) 4 mg disintegrating tablet Take by mouth      predniSONE 10 mg tablet Take by mouth      predniSONE 50 mg tablet Take 50 mg by mouth daily With food  0    traMADol (ULTRAM) 50 mg tablet Take by mouth      [DISCONTINUED] budesonide-formoterol (SYMBICORT) 160-4 5 mcg/act inhaler       [DISCONTINUED] dicyclomine (BENTYL) 20 mg tablet Take 20 mg by mouth every 6 (six) hours as needed  0    [DISCONTINUED] prochlorperazine (COMPAZINE) 10 mg tablet Take 10 mg by mouth 3 (three) times a day  0    [DISCONTINUED] EPINEPHrine (EPIPEN) 0 3 mg/0 3 mL SOAJ Inject 0 3 mL (0 3 mg total) into a muscle once for 1 dose 0 6 mL 0    [DISCONTINUED] fluticasone-salmeterol (AIRDUO RESPICLICK) 579-36 mcg/act dry powder inhaler Inhale 1 puff 2 (two) times a day  2    [DISCONTINUED] Montelukast Sodium (SINGULAIR PO) Take by mouth      [DISCONTINUED] thyroid (ARMOUR THYROID) 60 MG tablet Take by mouth       No current facility-administered medications on file prior to visit  Review of Systems   Constitutional: Negative for activity change, appetite change, chills, fatigue, fever and unexpected weight change  HENT: Positive for voice change  Negative for hearing loss, nosebleeds, sinus pain, sneezing, sore throat and trouble swallowing  Eyes: Negative for photophobia and visual disturbance  Respiratory: Positive for shortness of breath  Negative for cough, chest tightness and wheezing  Cardiovascular: Negative for chest pain, palpitations and leg swelling  Gastrointestinal: Positive for constipation, diarrhea, nausea and vomiting  Negative for abdominal distention, abdominal pain and blood in stool  Genitourinary: Negative for decreased urine volume, difficulty urinating, dysuria, flank pain, genital sores, hematuria, menstrual problem, pelvic pain and urgency  Musculoskeletal: Positive for arthralgias  Negative for back pain, gait problem, joint swelling, myalgias, neck pain and neck stiffness  Skin: Negative for pallor, rash and wound  Neurological: Negative for dizziness, seizures, syncope, weakness, numbness and headaches  Hematological: Negative for adenopathy  Does not bruise/bleed easily  Psychiatric/Behavioral: Negative for confusion, hallucinations, self-injury, sleep disturbance and suicidal ideas  The patient is not nervous/anxious  Objective:    /86 (BP Location: Right arm, Patient Position: Sitting, Cuff Size: Standard)   Pulse 83   Temp 98 °F (36 7 °C) (Temporal)   Resp 16   Wt 52 6 kg (116 lb)   SpO2 98%   BMI 20 55 kg/m²     Physical Exam   Constitutional: She is oriented to person, place, and time  She appears well-developed and well-nourished  No distress  HENT:   Head: Normocephalic and atraumatic  Right Ear: Tympanic membrane normal    Left Ear: Tympanic membrane normal    Nose: Mucosal edema present     Mouth/Throat: Uvula is midline and mucous membranes are normal  Posterior oropharyngeal erythema (mild) present  No oropharyngeal exudate  Eyes: Pupils are equal, round, and reactive to light  EOM are normal    Neck: Normal range of motion  Neck supple  No thyromegaly present  Cardiovascular: Normal rate, regular rhythm, normal heart sounds and intact distal pulses  Exam reveals no gallop and no friction rub  No murmur heard  Pulmonary/Chest: Effort normal and breath sounds normal  No respiratory distress  She has no wheezes  She exhibits no tenderness  Abdominal: Soft  Bowel sounds are normal  She exhibits no distension  There is no tenderness  There is no rebound and no guarding  Musculoskeletal: Normal range of motion  She exhibits no edema or deformity  Lymphadenopathy:     She has no cervical adenopathy  Neurological: She is alert and oriented to person, place, and time  She displays normal reflexes  No sensory deficit  Coordination normal    Skin: Skin is warm and dry  Capillary refill takes less than 2 seconds  No rash noted  She is not diaphoretic  Psychiatric: She has a normal mood and affect  Her behavior is normal    Nursing note and vitals reviewed        MARSHA Fields  11/27/19  11:48 AM

## 2019-11-28 LAB — A1AT SERPL-MCNC: 115 MG/DL (ref 100–188)

## 2019-11-29 DIAGNOSIS — R74.8 LOW SERUM ALKALINE PHOSPHATASE: Primary | ICD-10-CM

## 2019-11-29 LAB
HIV 1+2 AB+HIV1 P24 AG SERPL QL IA: NORMAL
PHOSPHATE SERPL-MCNC: 3.3 MG/DL (ref 2.7–4.5)

## 2019-12-04 DIAGNOSIS — R52 CHRONIC GENERALIZED PAIN: ICD-10-CM

## 2019-12-04 DIAGNOSIS — G89.29 CHRONIC GENERALIZED PAIN: ICD-10-CM

## 2019-12-04 RX ORDER — GABAPENTIN 100 MG/1
200 CAPSULE ORAL 3 TIMES DAILY
Qty: 180 CAPSULE | Refills: 3 | Status: SHIPPED | OUTPATIENT
Start: 2019-12-04 | End: 2020-01-04

## 2019-12-11 DIAGNOSIS — B00.1 HERPES LABIALIS: ICD-10-CM

## 2019-12-11 DIAGNOSIS — B00.1 HERPES LABIALIS: Primary | ICD-10-CM

## 2019-12-11 RX ORDER — ACYCLOVIR 50 MG/G
OINTMENT TOPICAL EVERY 4 HOURS
Qty: 15 G | Refills: 0 | Status: SHIPPED | OUTPATIENT
Start: 2019-12-11 | End: 2020-07-16 | Stop reason: SDUPTHER

## 2019-12-11 RX ORDER — VALACYCLOVIR HYDROCHLORIDE 1 G/1
2000 TABLET, FILM COATED ORAL 2 TIMES DAILY
Qty: 4 TABLET | Refills: 0 | Status: SHIPPED | OUTPATIENT
Start: 2019-12-11 | End: 2019-12-11

## 2019-12-11 RX ORDER — VALACYCLOVIR HYDROCHLORIDE 1 G/1
2000 TABLET, FILM COATED ORAL 2 TIMES DAILY
Qty: 4 TABLET | Refills: 0 | Status: SHIPPED | OUTPATIENT
Start: 2019-12-11 | End: 2020-05-08 | Stop reason: SDUPTHER

## 2019-12-11 RX ORDER — VALACYCLOVIR HYDROCHLORIDE 1 G/1
2000 TABLET, FILM COATED ORAL 2 TIMES DAILY
Qty: 4 TABLET | Refills: 0 | Status: CANCELLED | OUTPATIENT
Start: 2019-12-11 | End: 2019-12-12

## 2019-12-11 NOTE — TELEPHONE ENCOUNTER
ALL medications have been sent there!      [facility] the [facility] thank you

## 2019-12-17 ENCOUNTER — TELEPHONE (OUTPATIENT)
Dept: PULMONOLOGY | Facility: CLINIC | Age: 39
End: 2019-12-17

## 2019-12-17 NOTE — TELEPHONE ENCOUNTER
Roma Keller called regarding her canceled appointment  She was upset that her [employer] plan was terminated "in error" and has been told it will be reinstated in the next 2-3 wks  I asked her to call us back when she has coverage  I told Chance Hodgson I would note our conversation and CC our triage nurse to find her a new patient appt when she calls

## 2019-12-17 NOTE — TELEPHONE ENCOUNTER
Called patient to confirm appointment for 12/18 with Dr Isaac Booker  Patient was upset on the phone due to information she received from [facility]  I asked her when her insurance would be effective to see to reschedule her appointment and we got disconnected  I called patient back stated I was going to cancel her appointment per our conversation and if she needed anything to contact us

## 2019-12-28 ENCOUNTER — APPOINTMENT (EMERGENCY)
Dept: NON INVASIVE DIAGNOSTICS | Facility: HOSPITAL | Age: 39
End: 2019-12-28
Payer: COMMERCIAL

## 2019-12-28 ENCOUNTER — HOSPITAL ENCOUNTER (EMERGENCY)
Facility: HOSPITAL | Age: 39
Discharge: HOME | End: 2019-12-29
Attending: EMERGENCY MEDICINE | Admitting: EMERGENCY MEDICINE
Payer: COMMERCIAL

## 2019-12-28 VITALS
TEMPERATURE: 98.2 F | DIASTOLIC BLOOD PRESSURE: 50 MMHG | RESPIRATION RATE: 17 BRPM | OXYGEN SATURATION: 100 % | HEART RATE: 85 BPM | BODY MASS INDEX: 21.09 KG/M2 | WEIGHT: 119.05 LBS | SYSTOLIC BLOOD PRESSURE: 93 MMHG

## 2019-12-28 DIAGNOSIS — M54.40 BACK PAIN OF LUMBAR REGION WITH SCIATICA: Primary | ICD-10-CM

## 2019-12-28 DIAGNOSIS — M79.604 RIGHT LEG PAIN: ICD-10-CM

## 2019-12-28 PROCEDURE — 93971 EXTREMITY STUDY: CPT

## 2019-12-28 PROCEDURE — 99284 EMERGENCY DEPT VISIT MOD MDM: CPT

## 2019-12-28 PROCEDURE — 99284 EMERGENCY DEPT VISIT MOD MDM: CPT | Performed by: EMERGENCY MEDICINE

## 2019-12-28 RX ORDER — LIDOCAINE 50 MG/G
1 PATCH TOPICAL ONCE
Status: DISCONTINUED | OUTPATIENT
Start: 2019-12-28 | End: 2019-12-29 | Stop reason: HOSPADM

## 2019-12-28 RX ORDER — GABAPENTIN 100 MG/1
200 CAPSULE ORAL ONCE
Status: COMPLETED | OUTPATIENT
Start: 2019-12-28 | End: 2019-12-28

## 2019-12-28 RX ADMIN — LIDOCAINE 1 PATCH: 50 PATCH TOPICAL at 21:55

## 2019-12-28 RX ADMIN — GABAPENTIN 200 MG: 100 CAPSULE ORAL at 22:13

## 2019-12-29 PROCEDURE — 93971 EXTREMITY STUDY: CPT | Performed by: SURGERY

## 2019-12-29 RX ORDER — GABAPENTIN 100 MG/1
200 CAPSULE ORAL 3 TIMES DAILY
Qty: 42 CAPSULE | Refills: 0 | Status: SHIPPED | OUTPATIENT
Start: 2019-12-29 | End: 2020-01-04

## 2019-12-29 RX ORDER — LIDOCAINE 50 MG/G
1 PATCH TOPICAL DAILY
Qty: 15 PATCH | Refills: 0 | Status: SHIPPED | OUTPATIENT
Start: 2019-12-29 | End: 2020-09-02

## 2019-12-29 RX ADMIN — DICLOFENAC 2 G: 10 GEL TOPICAL at 00:31

## 2019-12-29 NOTE — ED NOTES
Patient requesting something additional for pain at this time   Dr Esperanza Arroyo made aware      Kota Parkinson, RN  12/28/19 [id number]

## 2019-12-29 NOTE — ED PROVIDER NOTES
History  Chief Complaint   Patient presents with    Leg Swelling     pt reports right leg pain for three weeks, reports today she noticed her right ankle starting to swell and developed bruising and redness, denies any injury     Patient presents with right low back pain that radiates to her right foot  This been ongoing for the past 3 weeks  Has chronic back pain but states that the radiation to her foot is new and different  Denies any bowel or bladder incontinence  States that she feels like her leg is swollen and tender to touch as well  States that most of the swelling is in her ankle  Denies any recent injuries, twisting, stress to the area, etc   No prior history of DVT or PE  History provided by:  Patient   used: No    Back Pain   Location:  Lumbar spine and gluteal region  Quality:  Aching, burning, shooting and stabbing  Radiates to:  R foot  Pain severity:  Moderate  Onset quality:  Gradual  Duration:  3 weeks  Timing:  Intermittent  Progression:  Waxing and waning  Context: not recent illness and not recent injury    Relieved by:  Nothing  Associated symptoms: leg pain, numbness and paresthesias    Associated symptoms: no abdominal pain, no bladder incontinence, no bowel incontinence, no fever and no weakness    Leg Pain   Location:  Ankle and leg  Injury: no    Leg location:  R leg and R lower leg  Ankle location:  R ankle  Pain details:     Severity:  Moderate    Progression:  Waxing and waning  Worsened by: Activity  Associated symptoms: back pain    Associated symptoms: no fever        Prior to Admission Medications   Prescriptions Last Dose Informant Patient Reported? Taking?    EPINEPHrine (EPIPEN) 0 3 mg/0 3 mL SOAJ   No No   Sig: Inject 0 3 mL (0 3 mg total) into a muscle once for 1 dose   acyclovir (ZOVIRAX) 5 % ointment   No No   Sig: Apply topically every 4 (four) hours   albuterol (2 5 mg/3 mL) 0 083 % nebulizer solution   Yes No   Sig: Inhale 1 each every 6 (six) hours as needed   albuterol (VENTOLIN HFA) 90 mcg/act inhaler   Yes No   dicyclomine (BENTYL) 20 mg tablet   No No   Sig: Take 1 tablet (20 mg total) by mouth every 6 (six) hours as needed (stomach cramps)   fluticasone-salmeterol (ADVAIR, WIXELA) 250-50 mcg/dose inhaler   No No   Sig: Inhale 1 puff 2 (two) times a day Rinse mouth after use    gabapentin (NEURONTIN) 100 mg capsule   No No   Sig: Take 2 capsules (200 mg total) by mouth 3 (three) times a day   ibuprofen (MOTRIN) 600 mg tablet   Yes No   Sig: Take by mouth   montelukast (SINGULAIR) 10 mg tablet   No No   Sig: Take 1 tablet (10 mg total) by mouth daily at bedtime   naproxen sodium (ANAPROX) 550 mg tablet   Yes No   Sig: Take by mouth   omeprazole (PriLOSEC) 40 MG capsule   No No   Sig: Take 1 capsule (40 mg total) by mouth daily   ondansetron (ZOFRAN-ODT) 4 mg disintegrating tablet   Yes No   Sig: Take by mouth   predniSONE 10 mg tablet   Yes No   Sig: Take by mouth   predniSONE 50 mg tablet   Yes No   Sig: Take 50 mg by mouth daily With food   prochlorperazine (COMPAZINE) 10 mg tablet   No No   Sig: Take 1 tablet (10 mg total) by mouth 3 (three) times a day   tiotropium (SPIRIVA RESPIMAT) 1 25 MCG/ACT AERS inhaler   No No   Sig: Inhale 2 puffs daily   valACYclovir (VALTREX) 1,000 mg tablet   No No   Sig: Take 2 tablets (2,000 mg total) by mouth 2 (two) times a day for 1 day   varenicline (CHANTIX COLLIN) 0 5 MG X 11 & 1 MG X 42 tablet   No No   Sig: Take one 0 5mg tab by mouth 1x daily for 3 days, then increase to one 0 5mg tab 2x daily for 3 days, then increase to one 1mg tab 2x daily      Facility-Administered Medications: None       Past Medical History:   Diagnosis Date    Anemia     Anxiety     Arthritis     Asthma     COPD (chronic obstructive pulmonary disease) (HCC)     Coronary artery disease     GERD (gastroesophageal reflux disease)     Heart murmur     Hypertension     Hypothyroidism     Infectious viral hepatitis     Mitral valve regurgitation     Myocardial infarction ([facility] [address] )     Pneumonia     Substance abuse ([facility] )     herion last used 3 5 years     Tobacco abuse     Urinary tract infection        Past Surgical History:   Procedure Laterality Date    APPENDECTOMY      OVARIAN CYST REMOVAL         History reviewed  No pertinent family history  I have reviewed and agree with the history as documented  Social History     Tobacco Use    Smoking status: Current Every Day Smoker     Packs/day: 0 50     Types: Cigarettes    Smokeless tobacco: Never Used   Substance Use Topics    Alcohol use: No    Drug use: Not Currently     Comment: gel for arthrisis        Review of Systems   Constitutional: Negative for chills and fever  Cardiovascular: Positive for leg swelling  Gastrointestinal: Negative for abdominal distention, abdominal pain and bowel incontinence  Genitourinary: Negative for bladder incontinence, decreased urine volume, difficulty urinating and urgency  Musculoskeletal: Positive for arthralgias, back pain, joint swelling and myalgias  Negative for gait problem  Skin: Negative for color change, pallor, rash and wound  Allergic/Immunologic: Negative for immunocompromised state  Neurological: Positive for numbness and paresthesias  Negative for weakness  All other systems reviewed and are negative  Physical Exam  Physical Exam   Constitutional: She is oriented to person, place, and time  She appears well-developed and well-nourished  No distress  HENT:   Head: Normocephalic and atraumatic  Eyes: Right eye exhibits no discharge  Left eye exhibits no discharge  Cardiovascular: Normal rate and intact distal pulses  Pulmonary/Chest: Effort normal  No stridor  No respiratory distress  Abdominal: Soft  She exhibits no distension  There is no tenderness  There is no guarding  Musculoskeletal: She exhibits tenderness  She exhibits no deformity  Right ankle: She exhibits swelling   She exhibits normal range of motion, no deformity and normal pulse  Tenderness  Medial malleolus tenderness found  Left ankle: Normal         Lumbar back: She exhibits decreased range of motion, tenderness and pain  Right lower leg: She exhibits tenderness  She exhibits no swelling, no edema, no deformity and no laceration  Left lower leg: She exhibits no tenderness, no swelling, no edema and no deformity  Neurological: She is alert and oriented to person, place, and time  Skin: Skin is warm and dry  She is not diaphoretic  Psychiatric: She has a normal mood and affect  Nursing note and vitals reviewed        Vital Signs  ED Triage Vitals   Temperature Pulse Respirations Blood Pressure SpO2   12/28/19 2052 12/28/19 2052 12/28/19 2052 12/28/19 2054 12/28/19 2052   98 2 °F (36 8 °C) 85 17 118/70 100 %      Temp src Heart Rate Source Patient Position - Orthostatic VS BP Location FiO2 (%)   -- 12/28/19 2052 12/28/19 2206 12/28/19 2206 --    Monitor Lying Right arm       Pain Score       12/28/19 2052       7           Vitals:    12/28/19 2052 12/28/19 2054 12/28/19 2206 12/28/19 2311   BP:  118/70 94/55 93/50   Pulse: 85  73 85   Patient Position - Orthostatic VS:   Lying Lying         Visual Acuity      ED Medications  Medications   lidocaine (LIDODERM) 5 % patch 1 patch (1 patch Topical Medication Applied 12/28/19 2155)   diclofenac sodium (VOLTAREN) 1 % topical gel 2 g (has no administration in time range)   gabapentin (NEURONTIN) capsule 200 mg (200 mg Oral Given 12/28/19 2213)       Diagnostic Studies  Results Reviewed     None                 VAS lower limb venous duplex study, unilateral/limited    (Results Pending)              Procedures  Procedures         ED Course                               MDM  Number of Diagnoses or Management Options  Back pain of lumbar region with sciatica: new and requires workup  Right leg pain: new and requires workup  Diagnosis management comments: Patient presents with signs and symptoms that appear consistent with sciatica  Has chronic back problems with pain in her back on a daily basis that waxes and wanes  Patient was told that she was going to developed sciatica at some point by her specialist   Patient does have a positive straight leg raise with radicular pain to the foot  There is redness to the medial malleolus of the right ankle but the patient has been rubbing this area because of pain  Minimal swelling to that area as well which could be reactive  Patient has good pulses and is neurovascularly intact  She also complains of right calf pain  Will obtain a venous duplex, treat supportively for sciatica and will treat for DVT if her venous duplex is positive  12:14 AM  Venous duplex is negative for DVT  Will treat supportively for probable sciatica  Will place an order for a comprehensive spine program   Will treat with Lidoderm, Voltaren gel and patient is requesting gabapentin since she has been on that before  Patient has been out of this for week  Will write her for a few days and have her follow up with her PCP this week  Amount and/or Complexity of Data Reviewed  Tests in the radiology section of CPT®: ordered and reviewed  Review and summarize past medical records: yes    Patient Progress  Patient progress: stable        Disposition  Final diagnoses:   Back pain of lumbar region with sciatica   Right leg pain     Time reflects when diagnosis was documented in both MDM as applicable and the Disposition within this note     Time User Action Codes Description Comment    12/28/2019 11:17 PM Anderson Alberto Add [M54 40] Back pain of lumbar region with sciatica     12/28/2019 11:18 PM Mikaela Hood Add [D00 625] Right leg pain       ED Disposition     ED Disposition Condition Date/Time Comment    Discharge Stable Sat Dec 28, 2019 11:17 PM Uriel Hudson discharge to home/self care              Follow-up Information     Follow up With Specialties Details Why Contact Info Additional Information    [facility] Physical Therapy Call in 2 days To schedule an appointment as soon as you can           Patient's Medications   Discharge Prescriptions    DICLOFENAC SODIUM (VOLTAREN) 1 %    Apply 2 g topically 4 (four) times a day       Start Date: 12/29/2019End Date: --       Order Dose: 2 g       Quantity: 1 Tube    Refills: 0    GABAPENTIN (NEURONTIN) 100 MG CAPSULE    Take 2 capsules (200 mg total) by mouth 3 (three) times a day for 7 days For post-herpetic neuralgia: Take 1 tablet on day 1,  Then take 2 tablets on day 2, Then take 3 tablets on day 3 and every day after that as instructed by your doctor  Start Date: 12/29/2019End Date: 1/5/2020       Order Dose: 200 mg       Quantity: 42 capsule    Refills: 0    LIDOCAINE (LIDODERM) 5 %    Apply 1 patch topically daily Remove & Discard patch within 12 hours or as directed by MD       Start Date: 12/29/2019End Date: --       Order Dose: 1 patch       Quantity: 15 patch    Refills: 0     No discharge procedures on file      ED Provider  Electronically Signed by           Yuliana Baldwin DO  12/29/19 0018

## 2019-12-29 NOTE — ED NOTES
Spoke to Betty Couch vascular tech at this time who states she is not the on call tech, states she has switched with "Erickson Rangel"   called again at this time and page sent out to Erickson Hunt RN  12/28/19 5980

## 2019-12-29 NOTE — ED NOTES
Per provider, Pt allowed food and beverage  Provided Pt with a sandwich, crackers, and water       Vanesa Marin  12/28/19 1734

## 2020-01-04 DIAGNOSIS — M79.606 LUMBAR PAIN WITH RADIATION DOWN LEG: Primary | ICD-10-CM

## 2020-01-04 DIAGNOSIS — G89.29 CHRONIC GENERALIZED PAIN: ICD-10-CM

## 2020-01-04 DIAGNOSIS — M54.50 LUMBAR PAIN WITH RADIATION DOWN LEG: Primary | ICD-10-CM

## 2020-01-04 DIAGNOSIS — R52 CHRONIC GENERALIZED PAIN: ICD-10-CM

## 2020-01-04 RX ORDER — GABAPENTIN 300 MG/1
300 CAPSULE ORAL 3 TIMES DAILY
Qty: 90 CAPSULE | Refills: 2 | Status: SHIPPED | OUTPATIENT
Start: 2020-01-04 | End: 2020-03-17 | Stop reason: SDUPTHER

## 2020-01-07 ENCOUNTER — TELEPHONE (OUTPATIENT)
Dept: PHYSICAL THERAPY | Facility: OTHER | Age: 40
End: 2020-01-07

## 2020-01-07 NOTE — TELEPHONE ENCOUNTER
Voice message left for patient to call back  Phone number and hours of business provided  This is the 1st attempt to reach the patient  Will defer per protocol

## 2020-01-15 ENCOUNTER — TELEPHONE (OUTPATIENT)
Dept: PHYSICAL THERAPY | Facility: OTHER | Age: 40
End: 2020-01-15

## 2020-01-15 NOTE — TELEPHONE ENCOUNTER
Voice message left for patient to call back  Phone number and hours of business provided  This the 2nd attempt to reach the patient  Will defer per protocol

## 2020-01-21 ENCOUNTER — TELEPHONE (OUTPATIENT)
Dept: PHYSICAL THERAPY | Facility: OTHER | Age: 40
End: 2020-01-21

## 2020-01-29 ENCOUNTER — TELEPHONE (OUTPATIENT)
Dept: FAMILY MEDICINE CLINIC | Facility: CLINIC | Age: 40
End: 2020-01-29

## 2020-01-29 NOTE — TELEPHONE ENCOUNTER
An Employment of Assessment Form was completed  Pt was contacted to  form   Form was placed in  chart

## 2020-01-29 NOTE — TELEPHONE ENCOUNTER
----- Message from Aniyah sent at 1/27/2020 10:34 AM EST -----  Completed form placed in completed bin  Please send to patient as she is unable to get to the office to pick it up   Thank you!   Doug

## 2020-01-31 ENCOUNTER — TELEPHONE (OUTPATIENT)
Dept: FAMILY MEDICINE CLINIC | Facility: CLINIC | Age: 40
End: 2020-01-31

## 2020-01-31 DIAGNOSIS — R52 CHRONIC GENERALIZED PAIN: Primary | ICD-10-CM

## 2020-01-31 DIAGNOSIS — G89.29 CHRONIC GENERALIZED PAIN: Primary | ICD-10-CM

## 2020-01-31 NOTE — TELEPHONE ENCOUNTER
Thanks for bringing this to my attention  I have not approved any refills for this patient  I will speak to the pharmacist and patient  Thank you

## 2020-01-31 NOTE — TELEPHONE ENCOUNTER
Damian (Pharmacy Manager) [facility] ([place]) called to inform us that pt has refilled the Neurontin 300 mg 4 times in the last 3 weeks  Somehow it was missed by both [place]  The one in [place] and [place]  Patient has gotten a total of 360 pills  She came back yesterday for another refill  Her insurance did not have a restriction of when it can filled and it was missed by us  He notice it and just wanted to let us know that no more refills will be given to pt for at least 90 days  Patient was very upset with pharmacist when he ask about all the refills given and she stated Its not his business to know  He feels patient might be sharing pills or if she is taking this high of dosage of medication per day, she could be in crisis  This is a big RED FLAG  Any questions Brian Medina will be available to speak to you about it  [phone]  never used

## 2020-02-03 DIAGNOSIS — G89.29 OTHER CHRONIC PAIN: Primary | ICD-10-CM

## 2020-02-06 ENCOUNTER — TELEPHONE (OUTPATIENT)
Dept: FAMILY MEDICINE CLINIC | Facility: CLINIC | Age: 40
End: 2020-02-06

## 2020-02-06 NOTE — TELEPHONE ENCOUNTER
[facility] dosing service    Follow-up Pharmacokinetic Consult for Vancomycin Dosing     Reji Gomez is a 78year old female who is being treated for cellulitis w/ MRSA.    Patient is on day 3 of Vancomycin and is currently receiving 1 gm IV Q 24 hours [facility] sent referral back stating after review they had nothing they could offer patient   Please advise >=4 Resistant      Tetracycline <=1 Sensitive      Trimethoprim/Sulfa >=320 Resistant      Vancomycin 1 Sensitive        Based on the above:    1. Continue Vancomycin at 1 gm IVPB Q 24 hours actual body weight of 60.9 kg and renal function).  Vancomycin had

## 2020-02-12 ENCOUNTER — HOSPITAL ENCOUNTER (EMERGENCY)
Facility: HOSPITAL | Age: 40
Discharge: HOME | End: 2020-02-12
Attending: EMERGENCY MEDICINE
Payer: COMMERCIAL

## 2020-02-12 ENCOUNTER — APPOINTMENT (EMERGENCY)
Dept: RADIOLOGY | Facility: HOSPITAL | Age: 40
End: 2020-02-12

## 2020-02-12 VITALS
TEMPERATURE: 98.3 F | RESPIRATION RATE: 18 BRPM | DIASTOLIC BLOOD PRESSURE: 57 MMHG | BODY MASS INDEX: 21.83 KG/M2 | SYSTOLIC BLOOD PRESSURE: 107 MMHG | WEIGHT: 123.24 LBS | HEART RATE: 61 BPM | OXYGEN SATURATION: 95 %

## 2020-02-12 DIAGNOSIS — J45.901 ASTHMA EXACERBATION: Primary | ICD-10-CM

## 2020-02-12 LAB
ANION GAP SERPL CALCULATED.3IONS-SCNC: 14 MMOL/L (ref 4–13)
BACTERIA UR QL AUTO: ABNORMAL /HPF
BASOPHILS # BLD AUTO: 0.05 THOUSANDS/ΜL (ref 0–0.1)
BASOPHILS NFR BLD AUTO: 1 % (ref 0–1)
BILIRUB UR QL STRIP: ABNORMAL
BUN SERPL-MCNC: 13 MG/DL (ref 5–25)
CALCIUM SERPL-MCNC: 9 MG/DL (ref 8.3–10.1)
CHLORIDE SERPL-SCNC: 105 MMOL/L (ref 100–108)
CLARITY UR: CLEAR
CO2 SERPL-SCNC: 23 MMOL/L (ref 21–32)
COLOR UR: YELLOW
CREAT SERPL-MCNC: 0.88 MG/DL (ref 0.6–1.3)
EOSINOPHIL # BLD AUTO: 0.13 THOUSAND/ΜL (ref 0–0.61)
EOSINOPHIL NFR BLD AUTO: 2 % (ref 0–6)
ERYTHROCYTE [DISTWIDTH] IN BLOOD BY AUTOMATED COUNT: 12 % (ref 11.6–15.1)
EXT PREG TEST URINE: NEGATIVE
EXT. CONTROL ED NAV: NORMAL
GFR SERPL CREATININE-BSD FRML MDRD: 84 ML/MIN/1.73SQ M
GLUCOSE SERPL-MCNC: 101 MG/DL (ref 65–140)
GLUCOSE UR STRIP-MCNC: NEGATIVE MG/DL
HCT VFR BLD AUTO: 44.8 % (ref 34.8–46.1)
HGB BLD-MCNC: 15.1 G/DL (ref 11.5–15.4)
HGB UR QL STRIP.AUTO: ABNORMAL
IMM GRANULOCYTES # BLD AUTO: 0.02 THOUSAND/UL (ref 0–0.2)
IMM GRANULOCYTES NFR BLD AUTO: 0 % (ref 0–2)
KETONES UR STRIP-MCNC: NEGATIVE MG/DL
LEUKOCYTE ESTERASE UR QL STRIP: NEGATIVE
LYMPHOCYTES # BLD AUTO: 2.56 THOUSANDS/ΜL (ref 0.6–4.47)
LYMPHOCYTES NFR BLD AUTO: 33 % (ref 14–44)
MCH RBC QN AUTO: 30.6 PG (ref 26.8–34.3)
MCHC RBC AUTO-ENTMCNC: 33.7 G/DL (ref 31.4–37.4)
MCV RBC AUTO: 91 FL (ref 82–98)
MONOCYTES # BLD AUTO: 0.35 THOUSAND/ΜL (ref 0.17–1.22)
MONOCYTES NFR BLD AUTO: 5 % (ref 4–12)
MUCOUS THREADS UR QL AUTO: ABNORMAL
NEUTROPHILS # BLD AUTO: 4.7 THOUSANDS/ΜL (ref 1.85–7.62)
NEUTS SEG NFR BLD AUTO: 59 % (ref 43–75)
NITRITE UR QL STRIP: NEGATIVE
NON-SQ EPI CELLS URNS QL MICRO: ABNORMAL /HPF
NRBC BLD AUTO-RTO: 0 /100 WBCS
PH UR STRIP.AUTO: 5.5 [PH] (ref 4.5–8)
PLATELET # BLD AUTO: 205 THOUSANDS/UL (ref 149–390)
PMV BLD AUTO: 10.3 FL (ref 8.9–12.7)
POTASSIUM SERPL-SCNC: 2.9 MMOL/L (ref 3.5–5.3)
PROT UR STRIP-MCNC: ABNORMAL MG/DL
RBC # BLD AUTO: 4.93 MILLION/UL (ref 3.81–5.12)
RBC #/AREA URNS AUTO: ABNORMAL /HPF
SODIUM SERPL-SCNC: 142 MMOL/L (ref 136–145)
SP GR UR STRIP.AUTO: >=1.03 (ref 1–1.03)
UROBILINOGEN UR QL STRIP.AUTO: 0.2 E.U./DL
WBC # BLD AUTO: 7.81 THOUSAND/UL (ref 4.31–10.16)
WBC #/AREA URNS AUTO: ABNORMAL /HPF

## 2020-02-12 PROCEDURE — 99285 EMERGENCY DEPT VISIT HI MDM: CPT

## 2020-02-12 PROCEDURE — 99285 EMERGENCY DEPT VISIT HI MDM: CPT | Performed by: EMERGENCY MEDICINE

## 2020-02-12 PROCEDURE — 96375 TX/PRO/DX INJ NEW DRUG ADDON: CPT

## 2020-02-12 PROCEDURE — 94640 AIRWAY INHALATION TREATMENT: CPT

## 2020-02-12 PROCEDURE — 81025 URINE PREGNANCY TEST: CPT | Performed by: EMERGENCY MEDICINE

## 2020-02-12 PROCEDURE — 80048 BASIC METABOLIC PNL TOTAL CA: CPT | Performed by: EMERGENCY MEDICINE

## 2020-02-12 PROCEDURE — 36415 COLL VENOUS BLD VENIPUNCTURE: CPT | Performed by: EMERGENCY MEDICINE

## 2020-02-12 PROCEDURE — 85025 COMPLETE CBC W/AUTO DIFF WBC: CPT | Performed by: EMERGENCY MEDICINE

## 2020-02-12 PROCEDURE — 81001 URINALYSIS AUTO W/SCOPE: CPT

## 2020-02-12 PROCEDURE — 71046 X-RAY EXAM CHEST 2 VIEWS: CPT

## 2020-02-12 PROCEDURE — 96365 THER/PROPH/DIAG IV INF INIT: CPT

## 2020-02-12 RX ORDER — SODIUM CHLORIDE FOR INHALATION 0.9 %
12 VIAL, NEBULIZER (ML) INHALATION ONCE
Status: COMPLETED | OUTPATIENT
Start: 2020-02-12 | End: 2020-02-12

## 2020-02-12 RX ORDER — PREDNISONE 20 MG/1
60 TABLET ORAL DAILY
Qty: 15 TABLET | Refills: 0 | Status: SHIPPED | OUTPATIENT
Start: 2020-02-12 | End: 2020-02-17

## 2020-02-12 RX ORDER — DIPHENHYDRAMINE HYDROCHLORIDE 50 MG/ML
25 INJECTION INTRAMUSCULAR; INTRAVENOUS ONCE
Status: COMPLETED | OUTPATIENT
Start: 2020-02-12 | End: 2020-02-12

## 2020-02-12 RX ORDER — IPRATROPIUM BROMIDE AND ALBUTEROL SULFATE 2.5; .5 MG/3ML; MG/3ML
3 SOLUTION RESPIRATORY (INHALATION) 4 TIMES DAILY
Qty: 120 ML | Refills: 0 | Status: SHIPPED | OUTPATIENT
Start: 2020-02-12 | End: 2020-02-22

## 2020-02-12 RX ORDER — MAGNESIUM SULFATE HEPTAHYDRATE 40 MG/ML
2 INJECTION, SOLUTION INTRAVENOUS ONCE
Status: COMPLETED | OUTPATIENT
Start: 2020-02-12 | End: 2020-02-12

## 2020-02-12 RX ORDER — METHYLPREDNISOLONE SODIUM SUCCINATE 125 MG/2ML
125 INJECTION, POWDER, LYOPHILIZED, FOR SOLUTION INTRAMUSCULAR; INTRAVENOUS ONCE
Status: COMPLETED | OUTPATIENT
Start: 2020-02-12 | End: 2020-02-12

## 2020-02-12 RX ORDER — ALBUTEROL SULFATE 2.5 MG/3ML
5 SOLUTION RESPIRATORY (INHALATION) ONCE
Status: COMPLETED | OUTPATIENT
Start: 2020-02-12 | End: 2020-02-12

## 2020-02-12 RX ADMIN — ALBUTEROL SULFATE 5 MG: 2.5 SOLUTION RESPIRATORY (INHALATION) at 19:04

## 2020-02-12 RX ADMIN — ALBUTEROL SULFATE 10 MG: 2.5 SOLUTION RESPIRATORY (INHALATION) at 19:48

## 2020-02-12 RX ADMIN — IPRATROPIUM BROMIDE 0.5 MG: 0.5 SOLUTION RESPIRATORY (INHALATION) at 19:04

## 2020-02-12 RX ADMIN — DIPHENHYDRAMINE HYDROCHLORIDE 25 MG: 50 INJECTION, SOLUTION INTRAMUSCULAR; INTRAVENOUS at 20:50

## 2020-02-12 RX ADMIN — IPRATROPIUM BROMIDE 1 MG: 0.5 SOLUTION RESPIRATORY (INHALATION) at 19:48

## 2020-02-12 RX ADMIN — METHYLPREDNISOLONE SODIUM SUCCINATE 125 MG: 125 INJECTION, POWDER, FOR SOLUTION INTRAMUSCULAR; INTRAVENOUS at 19:38

## 2020-02-12 RX ADMIN — ISODIUM CHLORIDE 12 ML: 0.03 SOLUTION RESPIRATORY (INHALATION) at 19:48

## 2020-02-12 RX ADMIN — MAGNESIUM SULFATE HEPTAHYDRATE 2 G: 40 INJECTION, SOLUTION INTRAVENOUS at 19:52

## 2020-02-13 NOTE — ED PROVIDER NOTES
Pt Name: Marii Willett  MRN: [id number]  [place] 1980  Age/Sex: 42 y o  female  Date of evaluation: 2/12/2020  PCP: Brown Simpson, [address] [place] [address]    Chief Complaint   Patient presents with    Shortness of Breath     pt c/o SOB for the past x1 month; pt states her condition has gotten worse daily and worse when she ambulates; pt denies cp/n/v/d; pt has a Hx of COPD and asthma and gets no relief with in home treatments         HPI    Mark Pickens presents to the Emergency Department complaining of cough, wheezing and SOB  She has known asthma and has been doing treatments at home but they are not working  She had been set up to see a pulmonologist but she lost her insurance and had to cancel the appointment  HPI      Past Medical and Surgical History    Past Medical History:   Diagnosis Date    Anemia     Anxiety     Arthritis     Asthma     [facility]ronic obstructive [facility] disease) [facility]     Coronary artery disease     GERD (gastroesophageal reflux disease)     Heart murmur     Hypertension     Hypothyroidism     Infectious viral hepatitis     Mitral valve regurgitation     Myocardial infarction ([facility] )     Pneumonia     Substance abuse ([facility] )     herion last used 3 5 years     Tobacco abuse     Urinary tract infection        Past Surgical History:   Procedure Laterality Date    APPENDECTOMY      OVARIAN CYST REMOVAL         History reviewed  No pertinent family history  Social History     Tobacco Use    Smoking status: Current Every Day Smoker     Packs/day: 0 50     Types: Cigarettes    Smokeless tobacco: Never Used   Substance Use Topics    Alcohol use: No    Drug use: Not Currently     Comment: gel for arthrisis               Allergies    Allergies   Allergen Reactions    Ativan [Lorazepam]     Coconut Oil      coconut    Nuts     Penicillins     Shellfish-Derived Products     Tegretol [Carbamazepine]        Home Medications    Prior to Admission medications Medication Sig Start Date End Date Taking?  Authorizing Provider   albuterol (2 5 mg/3 mL) 0 083 % nebulizer solution Inhale 1 each every 6 (six) hours as needed 9/17/14  Yes Historical Provider, MD   albuterol (VENTOLIN HFA) 90 mcg/act inhaler  7/1/15  Yes Historical Provider, MD   diclofenac sodium (VOLTAREN) 1 % Apply 2 g topically 4 (four) times a day 12/29/19  Yes Lit Cam DO   dicyclomine (BENTYL) 20 mg tablet Take 1 tablet (20 mg total) by mouth every 6 (six) hours as needed (stomach cramps) 11/27/19  Yes MARSHA Wood   gabapentin (NEURONTIN) 300 mg capsule Take 1 capsule (300 mg total) by mouth 3 (three) times a day 1/4/20  Yes MARSHA Wood   ibuprofen (MOTRIN) 600 mg tablet Take by mouth 6/11/15  Yes Historical Provider, MD   lidocaine (LIDODERM) 5 % Apply 1 patch topically daily Remove & Discard patch within 12 hours or as directed by MD 12/29/19  Yes Lit Cam DO   montelukast (SINGULAIR) 10 mg tablet Take 1 tablet (10 mg total) by mouth daily at bedtime 11/27/19  Yes MARSHA Wood   omeprazole (PriLOSEC) 40 MG capsule Take 1 capsule (40 mg total) by mouth daily 11/27/19  Yes MARSHA Wood   ondansetron (ZOFRAN-ODT) 4 mg disintegrating tablet Take by mouth 6/26/16  Yes Historical Provider, MD   prochlorperazine (COMPAZINE) 10 mg tablet Take 1 tablet (10 mg total) by mouth 3 (three) times a day 11/27/19  Yes MARSHA Wood   tiotropium (SPIRIVA RESPIMAT) 1 25 MCG/ACT AERS inhaler Inhale 2 puffs daily 11/27/19  Yes MARSHA Wood   acyclovir (ZOVIRAX) 5 % ointment Apply topically every 4 (four) hours  Patient not taking: Reported on 2/12/2020 12/11/19   MARSHA Wood   EPINEPHrine (EPIPEN) 0 3 mg/0 3 mL SOAJ Inject 0 3 mL (0 3 mg total) into a muscle once for 1 dose 11/27/19 11/27/19  MARSHA Wood   fluticasone-salmeterol (ADVAIR, Agnieszka Stratton) 250-50 mcg/dose inhaler Inhale 1 puff 2 (two) times a day Rinse mouth after use   Patient not taking: Reported on 2/12/2020 11/27/19   MARSHA Dowd   naproxen sodium (ANAPROX) 550 mg tablet Take by mouth 3/16/16   Historical Provider, MD   predniSONE 10 mg tablet Take by mouth 2/18/16   Historical Provider, MD   predniSONE 50 mg tablet Take 50 mg by mouth daily With food 8/21/19   Historical Provider, MD   valACYclovir (VALTREX) 1,000 mg tablet Take 2 tablets (2,000 mg total) by mouth 2 (two) times a day for 1 day 12/11/19 12/12/19  MARSHA Dowd   varenicline (CHANTIX COLLIN) 0 5 MG X 11 & 1 MG X 42 tablet Take one 0 5mg tab by mouth 1x daily for 3 days, then increase to one 0 5mg tab 2x daily for 3 days, then increase to one 1mg tab 2x daily  Patient not taking: Reported on 2/12/2020 11/27/19   MARSHA Dowd           Review of Systems    Review of Systems   Constitutional: Negative for activity change, appetite change, chills, diaphoresis, fatigue and fever  HENT: Negative for congestion, postnasal drip, rhinorrhea, sinus pressure, sneezing and sore throat  Eyes: Negative for pain and visual disturbance  Respiratory: Positive for cough, shortness of breath and wheezing  Negative for chest tightness  Cardiovascular: Negative for chest pain, palpitations and leg swelling  Gastrointestinal: Negative for abdominal distention, abdominal pain, constipation, diarrhea, nausea and vomiting  Endocrine: Negative for polydipsia, polyphagia and polyuria  Genitourinary: Negative for decreased urine volume, difficulty urinating, dysuria, flank pain, frequency and hematuria  Musculoskeletal: Negative for arthralgias, gait problem, joint swelling and neck pain  Skin: Negative for pallor and rash  Allergic/Immunologic: Negative for immunocompromised state  Neurological: Negative for syncope, speech difficulty, weakness, light-headedness, numbness and headaches  All other systems reviewed and are negative            All other systems reviewed and negative  Physical Exam      ED Triage Vitals [02/12/20 1837]   Temperature Pulse Respirations Blood Pressure SpO2   98 3 °F (36 8 °C) (!) 107 22 130/70 98 %      Temp Source Heart Rate Source Patient Position - Orthostatic VS BP Location FiO2 (%)   Oral Monitor Sitting Right arm --      Pain Score       3               Physical Exam   Constitutional: She is oriented to person, place, and time  She appears well-developed and well-nourished  No distress  HENT:   Head: Normocephalic and atraumatic  Nose: Nose normal    Mouth/Throat: Oropharynx is clear and moist    Eyes: Pupils are equal, round, and reactive to light  Conjunctivae, EOM and lids are normal    Neck: Normal range of motion  Neck supple  Cardiovascular: Normal rate, regular rhythm and normal heart sounds  Exam reveals no gallop and no friction rub  No murmur heard  Pulmonary/Chest: Accessory muscle usage present  Tachypnea noted  No respiratory distress  She has decreased breath sounds  She has wheezes  She has no rales  Abdominal: Soft  She exhibits no distension  There is no tenderness  There is no rebound and no guarding  Neurological: She is alert and oriented to person, place, and time  No cranial nerve deficit or sensory deficit  Skin: Skin is warm and dry  No rash noted  She is not diaphoretic  No erythema  Psychiatric: She has a normal mood and affect  Her speech is normal and behavior is normal  Judgment and thought content normal    Nursing note and vitals reviewed  Assessment and Plan    Virginia Tavarez is a 42 y o  female who presents with cough and SOB  Physical examination remarkable for diminished breath sounds  Differential diagnosis (not completely inclusive) includes acute asthma exacerbation  Plan will be to perform diagnostic testing and treat symptomatically        MDM    Diagnostic Results      Labs:    Results for orders placed or performed during the hospital encounter of 02/12/20   CBC and differential Result Value Ref Range    WBC 7 81 4 31 - 10 16 Thousand/uL    RBC 4 93 3 81 - 5 12 Million/uL    Hemoglobin 15 1 11 5 - 15 4 g/dL    Hematocrit 44 8 34 8 - 46 1 %    MCV 91 82 - 98 fL    MCH 30 6 26 8 - 34 3 pg    MCHC 33 7 31 4 - 37 4 g/dL    RDW 12 0 11 6 - 15 1 %    MPV 10 3 8 9 - 12 7 fL    Platelets 477 563 - 143 Thousands/uL    nRBC 0 /100 WBCs    Neutrophils Relative 59 43 - 75 %    Immat GRANS % 0 0 - 2 %    Lymphocytes Relative 33 14 - 44 %    Monocytes Relative 5 4 - 12 %    Eosinophils Relative 2 0 - 6 %    Basophils Relative 1 0 - 1 %    Neutrophils Absolute 4 70 1 85 - 7 62 Thousands/µL    Immature Grans Absolute 0 02 0 00 - 0 20 Thousand/uL    Lymphocytes Absolute 2 56 0 60 - 4 47 Thousands/µL    Monocytes Absolute 0 35 0 17 - 1 22 Thousand/µL    Eosinophils Absolute 0 13 0 00 - 0 61 Thousand/µL    Basophils Absolute 0 05 0 00 - 0 10 Thousands/µL   Basic metabolic panel   Result Value Ref Range    Sodium 142 136 - 145 mmol/L    Potassium 2 9 (L) 3 5 - 5 3 mmol/L    Chloride 105 100 - 108 mmol/L    CO2 23 21 - 32 mmol/L    ANION GAP 14 (H) 4 - 13 mmol/L    BUN 13 5 - 25 mg/dL    Creatinine 0 88 0 60 - 1 30 mg/dL    Glucose 101 65 - 140 mg/dL    Calcium 9 0 8 3 - 10 1 mg/dL    eGFR 84 ml/min/1 73sq m   Urine Microscopic   Result Value Ref Range    RBC, UA 2-4 (A) None Seen, 0-5 /hpf    WBC, UA 1-2 (A) None Seen, 0-5, 5-55, 5-65 /hpf    Epithelial Cells Moderate (A) None Seen, Occasional /hpf    Bacteria, UA Moderate (A) None Seen, Occasional /hpf    MUCUS THREADS Moderate (A) None Seen   POCT pregnancy, urine   Result Value Ref Range    EXT PREG TEST UR (Ref: Negative) Negative     Control Valid    Urine Macroscopic, POC   Result Value Ref Range    Color, UA Yellow     Clarity, UA Clear     pH, UA 5 5 4 5 - 8 0    Leukocytes, UA Negative Negative    Nitrite, UA Negative Negative    Protein, UA 30 (1+) (A) Negative mg/dl    Glucose, UA Negative Negative mg/dl    Ketones, UA Negative Negative mg/dl Urobilinogen, UA 0 2 0 2, 1 0 E U /dl E U /dl    Bilirubin, UA Interference- unable to analyze (A) Negative    Blood, UA Trace (A) Negative    Specific Gravity, UA >=1 030 1 003 - 1 030       All labs reviewed and utilized in the medical decision making process    Radiology:    X-ray chest 2 views    (Results Pending)     No acute abnormality    All radiology studies independently viewed by me and interpreted by the radiologist     Procedure    Procedures      ED Course of Care and Re-Assessments      Patient presenting with hx and physical exam consistent with acute asthma exacerbation  Patient treated with steroids and inhaled beta-agonists and has shown significant improvement  Respiratory exam much improved and patient feels well, will discharge home with continued steroids and beta-agonists  Instructed patient to return if any worsening in symptoms      I offered patient admission and she declined  She does not want to miss work  She will do more frequent nebs and steroid burst at home  Medications   albuterol inhalation solution 5 mg (5 mg Nebulization Given 2/12/20 1904)     And   ipratropium (ATROVENT) 0 02 % inhalation solution 0 5 mg (0 5 mg Nebulization Given 2/12/20 1904)   albuterol inhalation solution 10 mg (10 mg Nebulization Given 2/12/20 1948)   ipratropium (ATROVENT) 0 02 % inhalation solution 1 mg (1 mg Nebulization Given 2/12/20 1948)   sodium chloride 0 9 % inhalation solution 12 mL (12 mL Nebulization Given 2/12/20 1948)   magnesium sulfate 2 g/50 mL IVPB (premix) 2 g (0 g Intravenous Stopped 2/12/20 2012)   methylPREDNISolone sodium succinate (Solu-MEDROL) injection 125 mg (125 mg Intravenous Given 2/12/20 1938)   diphenhydrAMINE (BENADRYL) injection 25 mg (25 mg Intravenous Given 2/12/20 2050)     Good rx card given  Coupons printed as patient is concerned about the cost of her meds but she assures me she will get them       FINAL IMPRESSION    Final diagnoses:   Asthma exacerbation DISPOSITION/PLAN      Time reflects when diagnosis was documented in both MDM as applicable and the Disposition within this note     Time User Action Codes Description Comment    2/12/2020  9:50 PM Jessica Cole Add [[id number]] Asthma exacerbation       ED Disposition     ED Disposition Condition Date/Time Comment    Discharge Stable Wed Feb 12, 2020  9:50 PM Laiyareli Miller discharge to home/self care  Follow-up Information     Follow up With Specialties Details Why Contact Info    Ashleigh Christian, [address], Nurse Practitioner Schedule an appointment as soon as possible for a visit   [facility] [address]  [address] [facility] 43               PATIENT REFERRED TO:    Ashleigh Christian, [address]  [address] [facility] 43     Schedule an appointment as soon as possible for a visit         DISCHARGE MEDICATIONS:    Discharge Medication List as of 2/12/2020  9:51 PM      START taking these medications    Details   ipratropium-albuterol (DUO-NEB) 0 5-2 5 mg/3 mL nebulizer solution Take 1 vial (3 mL total) by nebulization 4 (four) times a day for 10 days, Starting Wed 2/12/2020, Until Sat 2/22/2020, Print      !! predniSONE 20 mg tablet Take 3 tablets (60 mg total) by mouth daily for 5 days, Starting Wed 2/12/2020, Until Mon 2/17/2020, Print       !! - Potential duplicate medications found  Please discuss with provider        CONTINUE these medications which have NOT CHANGED    Details   acyclovir (ZOVIRAX) 5 % ointment Apply topically every 4 (four) hours, Starting Wed 12/11/2019, Normal      albuterol (2 5 mg/3 mL) 0 083 % nebulizer solution Inhale 1 each every 6 (six) hours as needed, Starting Wed 9/17/2014, Historical Med      albuterol (VENTOLIN HFA) 90 mcg/act inhaler Starting Wed 7/1/2015, Historical Med      diclofenac sodium (VOLTAREN) 1 % Apply 2 g topically 4 (four) times a day, Starting Sun 12/29/2019, Print      dicyclomine (BENTYL) 20 mg tablet Take 1 tablet (20 mg total) by mouth every 6 (six) hours as needed (stomach cramps), Starting Wed 11/27/2019, Normal      EPINEPHrine (EPIPEN) 0 3 mg/0 3 mL SOAJ Inject 0 3 mL (0 3 mg total) into a muscle once for 1 dose, Starting Wed 11/27/2019, Print      fluticasone-salmeterol ([place]VAIR, [place] 250-50 mcg/dose inhaler Inhale 1 puff 2 (two) times a day Rinse mouth after use , Starting Wed 11/27/2019, Normal      gabapentin (NEURONTIN) 300 mg capsule Take 1 capsule (300 mg total) by mouth 3 (three) times a day, Starting Sat 1/4/2020, Normal      ibuprofen (MOTRIN) 600 mg tablet Take by mouth, Starting Thu 6/11/2015, Historical Med      lidocaine (LIDODERM) 5 % Apply 1 patch topically daily Remove & Discard patch within 12 hours or as directed by MD, Starting Sun 12/29/2019, Print      montelukast (SINGULAIR) 10 mg tablet Take 1 tablet (10 mg total) by mouth daily at bedtime, Starting Wed 11/27/2019, Normal      naproxen sodium (ANAPROX) 550 mg tablet Take by mouth, Starting Wed 3/16/2016, Historical Med      omeprazole (PriLOSEC) 40 MG capsule Take 1 capsule (40 mg total) by mouth daily, Starting Wed 11/27/2019, Normal      ondansetron (ZOFRAN-ODT) 4 mg disintegrating tablet Take by mouth, Starting Sun 6/26/2016, Historical Med      !! predniSONE 10 mg tablet Take by mouth, Starting Thu 2/18/2016, Historical Med      !! predniSONE 50 mg tablet Take 50 mg by mouth daily With food, Starting Wed 8/21/2019, Historical Med      prochlorperazine (COMPAZINE) 10 mg tablet Take 1 tablet (10 mg total) by mouth 3 (three) times a day, Starting Wed 11/27/2019, Normal      tiotropium (SPIRIVA RESPIMAT) 1 25 MCG/ACT AERS inhaler Inhale 2 puffs daily, Starting Wed 11/27/2019, Normal      valACYclovir (VALTREX) 1,000 mg tablet Take 2 tablets (2,000 mg total) by mouth 2 (two) times a day for 1 day, Starting Wed 12/11/2019, Until Thu 12/12/2019, Normal      varenicline (CHANTIX COLLIN) 0 5 MG X 11 & 1 MG X 42 tablet Take one 0 5mg tab by mouth 1x daily for 3 days, then increase to one 0 5mg tab 2x daily for 3 days, then increase to one 1mg tab 2x daily, Normal       !! - Potential duplicate medications found  Please discuss with provider  No discharge procedures on file           Malik Wallace, [address] [place] [address], DO  02/12/20 [id number]

## 2020-03-17 DIAGNOSIS — J45.909 UNCOMPLICATED ASTHMA, UNSPECIFIED ASTHMA SEVERITY, UNSPECIFIED WHETHER PERSISTENT: Primary | ICD-10-CM

## 2020-03-17 DIAGNOSIS — R52 CHRONIC GENERALIZED PAIN: ICD-10-CM

## 2020-03-17 DIAGNOSIS — G89.29 CHRONIC GENERALIZED PAIN: ICD-10-CM

## 2020-03-17 DIAGNOSIS — M79.606 LUMBAR PAIN WITH RADIATION DOWN LEG: ICD-10-CM

## 2020-03-17 DIAGNOSIS — M54.50 LUMBAR PAIN WITH RADIATION DOWN LEG: ICD-10-CM

## 2020-03-17 RX ORDER — GABAPENTIN 300 MG/1
300 CAPSULE ORAL 3 TIMES DAILY
Qty: 90 CAPSULE | Refills: 0 | Status: SHIPPED | OUTPATIENT
Start: 2020-03-17 | End: 2020-04-15 | Stop reason: SDUPTHER

## 2020-03-17 RX ORDER — ALBUTEROL SULFATE 90 UG/1
1 AEROSOL, METERED RESPIRATORY (INHALATION) EVERY 4 HOURS PRN
Qty: 1 INHALER | Refills: 2 | Status: SHIPPED | OUTPATIENT
Start: 2020-03-17 | End: 2020-04-15 | Stop reason: SDUPTHER

## 2020-03-17 NOTE — TELEPHONE ENCOUNTER
----- Message from Elizabethann Goldmann sent at 3/16/2020  7:06 PM EDT -----  Regarding: Prescription Question  Contact: [phone]  Yes and since then I have had to speak with the doctor and currently have no refills at the [facility], so I am asking if there can be another refill called into the pharmacy I use, which is not [facility]

## 2020-04-15 DIAGNOSIS — M54.50 LUMBAR PAIN WITH RADIATION DOWN LEG: ICD-10-CM

## 2020-04-15 DIAGNOSIS — R52 CHRONIC GENERALIZED PAIN: ICD-10-CM

## 2020-04-15 DIAGNOSIS — J45.909 UNCOMPLICATED ASTHMA, UNSPECIFIED ASTHMA SEVERITY, UNSPECIFIED WHETHER PERSISTENT: ICD-10-CM

## 2020-04-15 DIAGNOSIS — G89.29 CHRONIC GENERALIZED PAIN: ICD-10-CM

## 2020-04-15 DIAGNOSIS — M79.606 LUMBAR PAIN WITH RADIATION DOWN LEG: ICD-10-CM

## 2020-04-15 RX ORDER — GABAPENTIN 300 MG/1
300 CAPSULE ORAL 3 TIMES DAILY
Qty: 90 CAPSULE | Refills: 0 | Status: SHIPPED | OUTPATIENT
Start: 2020-04-15 | End: 2020-05-08 | Stop reason: SDUPTHER

## 2020-04-15 RX ORDER — ALBUTEROL SULFATE 90 UG/1
1 AEROSOL, METERED RESPIRATORY (INHALATION) EVERY 4 HOURS PRN
Qty: 1 INHALER | Refills: 2 | Status: SHIPPED | OUTPATIENT
Start: 2020-04-15 | End: 2020-05-08 | Stop reason: SDUPTHER

## 2020-05-08 ENCOUNTER — TELEPHONE (OUTPATIENT)
Dept: FAMILY MEDICINE CLINIC | Facility: CLINIC | Age: 40
End: 2020-05-08

## 2020-05-08 ENCOUNTER — TELEMEDICINE (OUTPATIENT)
Dept: FAMILY MEDICINE CLINIC | Facility: CLINIC | Age: 40
End: 2020-05-08

## 2020-05-08 DIAGNOSIS — G89.29 CHRONIC GENERALIZED PAIN: ICD-10-CM

## 2020-05-08 DIAGNOSIS — M79.606 LUMBAR PAIN WITH RADIATION DOWN LEG: ICD-10-CM

## 2020-05-08 DIAGNOSIS — F41.9 ANXIETY: ICD-10-CM

## 2020-05-08 DIAGNOSIS — Z72.0 TOBACCO ABUSE: ICD-10-CM

## 2020-05-08 DIAGNOSIS — J45.909 UNCOMPLICATED ASTHMA, UNSPECIFIED ASTHMA SEVERITY, UNSPECIFIED WHETHER PERSISTENT: ICD-10-CM

## 2020-05-08 DIAGNOSIS — R10.84 GENERALIZED ABDOMINAL PAIN: ICD-10-CM

## 2020-05-08 DIAGNOSIS — B00.1 HERPES LABIALIS: ICD-10-CM

## 2020-05-08 DIAGNOSIS — M54.50 LUMBAR PAIN WITH RADIATION DOWN LEG: ICD-10-CM

## 2020-05-08 DIAGNOSIS — Z86.19 HISTORY OF HEPATITIS: ICD-10-CM

## 2020-05-08 DIAGNOSIS — J45.40 MODERATE PERSISTENT ASTHMA, UNSPECIFIED WHETHER COMPLICATED: ICD-10-CM

## 2020-05-08 DIAGNOSIS — R63.4 WEIGHT LOSS: ICD-10-CM

## 2020-05-08 DIAGNOSIS — E03.8 HYPOTHYROIDISM DUE TO HASHIMOTO'S THYROIDITIS: Primary | ICD-10-CM

## 2020-05-08 DIAGNOSIS — G43.709 CHRONIC MIGRAINE WITHOUT AURA WITHOUT STATUS MIGRAINOSUS, NOT INTRACTABLE: ICD-10-CM

## 2020-05-08 DIAGNOSIS — R52 CHRONIC GENERALIZED PAIN: ICD-10-CM

## 2020-05-08 DIAGNOSIS — J45.40 MODERATE PERSISTENT ASTHMA, UNSPECIFIED WHETHER COMPLICATED: Primary | ICD-10-CM

## 2020-05-08 DIAGNOSIS — E06.3 HYPOTHYROIDISM DUE TO HASHIMOTO'S THYROIDITIS: Primary | ICD-10-CM

## 2020-05-08 PROCEDURE — T1015 CLINIC SERVICE: HCPCS | Performed by: NURSE PRACTITIONER

## 2020-05-08 PROCEDURE — 99213 OFFICE O/P EST LOW 20 MIN: CPT | Performed by: NURSE PRACTITIONER

## 2020-05-08 RX ORDER — MONTELUKAST SODIUM 10 MG/1
10 TABLET ORAL
Qty: 30 TABLET | Refills: 5 | Status: SHIPPED | OUTPATIENT
Start: 2020-05-08 | End: 2020-10-19

## 2020-05-08 RX ORDER — OMEPRAZOLE 40 MG/1
40 CAPSULE, DELAYED RELEASE ORAL DAILY
Qty: 30 CAPSULE | Refills: 5 | Status: SHIPPED | OUTPATIENT
Start: 2020-05-08 | End: 2020-10-19

## 2020-05-08 RX ORDER — BUDESONIDE AND FORMOTEROL FUMARATE DIHYDRATE 80; 4.5 UG/1; UG/1
2 AEROSOL RESPIRATORY (INHALATION) 2 TIMES DAILY
Qty: 1 INHALER | Refills: 4 | Status: SHIPPED | OUTPATIENT
Start: 2020-05-08 | End: 2020-05-29

## 2020-05-08 RX ORDER — VALACYCLOVIR HYDROCHLORIDE 1 G/1
2000 TABLET, FILM COATED ORAL 2 TIMES DAILY
Qty: 4 TABLET | Refills: 0 | Status: SHIPPED | OUTPATIENT
Start: 2020-05-08 | End: 2020-09-02 | Stop reason: CLARIF

## 2020-05-08 RX ORDER — VARENICLINE TARTRATE 25 MG
KIT ORAL
Qty: 53 TABLET | Refills: 0 | Status: SHIPPED | OUTPATIENT
Start: 2020-05-08 | End: 2020-07-16 | Stop reason: SDUPTHER

## 2020-05-08 RX ORDER — GABAPENTIN 300 MG/1
300 CAPSULE ORAL 3 TIMES DAILY
Qty: 90 CAPSULE | Refills: 0 | Status: SHIPPED | OUTPATIENT
Start: 2020-05-08 | End: 2020-06-01 | Stop reason: SDUPTHER

## 2020-05-08 RX ORDER — SUMATRIPTAN 50 MG/1
50 TABLET, FILM COATED ORAL ONCE AS NEEDED
Qty: 9 TABLET | Refills: 0 | Status: SHIPPED | OUTPATIENT
Start: 2020-05-08 | End: 2022-07-13

## 2020-05-08 RX ORDER — ALBUTEROL SULFATE 90 UG/1
1 AEROSOL, METERED RESPIRATORY (INHALATION) EVERY 4 HOURS PRN
Qty: 1 INHALER | Refills: 2 | Status: SHIPPED | OUTPATIENT
Start: 2020-05-08 | End: 2020-05-29 | Stop reason: SDUPTHER

## 2020-05-13 DIAGNOSIS — B00.1 RECURRENT HERPES LABIALIS: Primary | ICD-10-CM

## 2020-05-13 RX ORDER — VALACYCLOVIR HYDROCHLORIDE 500 MG/1
500 TABLET, FILM COATED ORAL DAILY
Qty: 30 TABLET | Refills: 3 | Status: SHIPPED | OUTPATIENT
Start: 2020-05-13 | End: 2020-07-16 | Stop reason: SDUPTHER

## 2020-05-14 ENCOUNTER — TELEPHONE (OUTPATIENT)
Dept: PULMONOLOGY | Facility: CLINIC | Age: 40
End: 2020-05-14

## 2020-05-18 ENCOUNTER — APPOINTMENT (OUTPATIENT)
Dept: LAB | Facility: HOSPITAL | Age: 40
End: 2020-05-18
Payer: COMMERCIAL

## 2020-05-18 ENCOUNTER — HOSPITAL ENCOUNTER (OUTPATIENT)
Dept: RADIOLOGY | Facility: HOSPITAL | Age: 40
Discharge: HOME | End: 2020-05-18
Payer: COMMERCIAL

## 2020-05-18 DIAGNOSIS — M79.606 LUMBAR PAIN WITH RADIATION DOWN LEG: ICD-10-CM

## 2020-05-18 DIAGNOSIS — E03.8 HYPOTHYROIDISM DUE TO HASHIMOTO'S THYROIDITIS: ICD-10-CM

## 2020-05-18 DIAGNOSIS — G89.29 OTHER CHRONIC PAIN: ICD-10-CM

## 2020-05-18 DIAGNOSIS — E06.3 HYPOTHYROIDISM DUE TO HASHIMOTO'S THYROIDITIS: ICD-10-CM

## 2020-05-18 DIAGNOSIS — R52 CHRONIC GENERALIZED PAIN: ICD-10-CM

## 2020-05-18 DIAGNOSIS — K29.70 GASTRITIS WITHOUT BLEEDING, UNSPECIFIED CHRONICITY, UNSPECIFIED GASTRITIS TYPE: ICD-10-CM

## 2020-05-18 DIAGNOSIS — G89.29 CHRONIC GENERALIZED PAIN: ICD-10-CM

## 2020-05-18 DIAGNOSIS — M54.50 LUMBAR PAIN WITH RADIATION DOWN LEG: ICD-10-CM

## 2020-05-18 DIAGNOSIS — Z86.19 HISTORY OF HEPATITIS: ICD-10-CM

## 2020-05-18 LAB
ALBUMIN SERPL BCP-MCNC: 3.8 G/DL (ref 3.5–5)
ALP SERPL-CCNC: 31 U/L (ref 46–116)
ALT SERPL W P-5'-P-CCNC: 52 U/L (ref 12–78)
ANION GAP SERPL CALCULATED.3IONS-SCNC: 9 MMOL/L (ref 4–13)
AST SERPL W P-5'-P-CCNC: 35 U/L (ref 5–45)
BASOPHILS # BLD AUTO: 0.05 THOUSANDS/ΜL (ref 0–0.1)
BASOPHILS NFR BLD AUTO: 1 % (ref 0–1)
BILIRUB SERPL-MCNC: 0.2 MG/DL (ref 0.2–1)
BUN SERPL-MCNC: 13 MG/DL (ref 5–25)
CALCIUM SERPL-MCNC: 8.6 MG/DL (ref 8.3–10.1)
CHLORIDE SERPL-SCNC: 105 MMOL/L (ref 100–108)
CHOLEST SERPL-MCNC: 150 MG/DL (ref 50–200)
CO2 SERPL-SCNC: 24 MMOL/L (ref 21–32)
CREAT SERPL-MCNC: 0.86 MG/DL (ref 0.6–1.3)
CRP SERPL QL: <3 MG/L
EOSINOPHIL # BLD AUTO: 0.16 THOUSAND/ΜL (ref 0–0.61)
EOSINOPHIL NFR BLD AUTO: 2 % (ref 0–6)
ERYTHROCYTE [DISTWIDTH] IN BLOOD BY AUTOMATED COUNT: 13 % (ref 11.6–15.1)
EST. AVERAGE GLUCOSE BLD GHB EST-MCNC: 100 MG/DL
GFR SERPL CREATININE-BSD FRML MDRD: 87 ML/MIN/1.73SQ M
GLUCOSE P FAST SERPL-MCNC: 112 MG/DL (ref 65–99)
HBA1C MFR BLD: 5.1 %
HCT VFR BLD AUTO: 40.8 % (ref 34.8–46.1)
HDLC SERPL-MCNC: 89 MG/DL
HGB BLD-MCNC: 13.6 G/DL (ref 11.5–15.4)
IMM GRANULOCYTES # BLD AUTO: 0.01 THOUSAND/UL (ref 0–0.2)
IMM GRANULOCYTES NFR BLD AUTO: 0 % (ref 0–2)
LDLC SERPL CALC-MCNC: 53 MG/DL (ref 0–100)
LYMPHOCYTES # BLD AUTO: 2.03 THOUSANDS/ΜL (ref 0.6–4.47)
LYMPHOCYTES NFR BLD AUTO: 30 % (ref 14–44)
MCH RBC QN AUTO: 31.1 PG (ref 26.8–34.3)
MCHC RBC AUTO-ENTMCNC: 33.3 G/DL (ref 31.4–37.4)
MCV RBC AUTO: 93 FL (ref 82–98)
MONOCYTES # BLD AUTO: 0.39 THOUSAND/ΜL (ref 0.17–1.22)
MONOCYTES NFR BLD AUTO: 6 % (ref 4–12)
NEUTROPHILS # BLD AUTO: 4.17 THOUSANDS/ΜL (ref 1.85–7.62)
NEUTS SEG NFR BLD AUTO: 61 % (ref 43–75)
NONHDLC SERPL-MCNC: 61 MG/DL
NRBC BLD AUTO-RTO: 0 /100 WBCS
PLATELET # BLD AUTO: 195 THOUSANDS/UL (ref 149–390)
PMV BLD AUTO: 10.5 FL (ref 8.9–12.7)
POTASSIUM SERPL-SCNC: 3.7 MMOL/L (ref 3.5–5.3)
PROT SERPL-MCNC: 7 G/DL (ref 6.4–8.2)
RBC # BLD AUTO: 4.38 MILLION/UL (ref 3.81–5.12)
SODIUM SERPL-SCNC: 138 MMOL/L (ref 136–145)
TRIGL SERPL-MCNC: 41 MG/DL
TSH SERPL DL<=0.05 MIU/L-ACNC: 2.39 UIU/ML (ref 0.36–3.74)
WBC # BLD AUTO: 6.81 THOUSAND/UL (ref 4.31–10.16)

## 2020-05-18 PROCEDURE — 86431 RHEUMATOID FACTOR QUANT: CPT

## 2020-05-18 PROCEDURE — 36415 COLL VENOUS BLD VENIPUNCTURE: CPT

## 2020-05-18 PROCEDURE — 80053 COMPREHEN METABOLIC PANEL: CPT

## 2020-05-18 PROCEDURE — 86038 ANTINUCLEAR ANTIBODIES: CPT

## 2020-05-18 PROCEDURE — 86705 HEP B CORE ANTIBODY IGM: CPT

## 2020-05-18 PROCEDURE — 86430 RHEUMATOID FACTOR TEST QUAL: CPT

## 2020-05-18 PROCEDURE — 87522 HEPATITIS C REVRS TRNSCRPJ: CPT

## 2020-05-18 PROCEDURE — 84443 ASSAY THYROID STIM HORMONE: CPT

## 2020-05-18 PROCEDURE — 72110 X-RAY EXAM L-2 SPINE 4/>VWS: CPT

## 2020-05-18 PROCEDURE — 86618 LYME DISEASE ANTIBODY: CPT

## 2020-05-18 PROCEDURE — 86704 HEP B CORE ANTIBODY TOTAL: CPT

## 2020-05-18 PROCEDURE — 87338 HPYLORI STOOL AG IA: CPT

## 2020-05-18 PROCEDURE — 80061 LIPID PANEL: CPT

## 2020-05-18 PROCEDURE — 86803 HEPATITIS C AB TEST: CPT

## 2020-05-18 PROCEDURE — 86140 C-REACTIVE PROTEIN: CPT

## 2020-05-18 PROCEDURE — 85025 COMPLETE CBC W/AUTO DIFF WBC: CPT

## 2020-05-18 PROCEDURE — 83036 HEMOGLOBIN GLYCOSYLATED A1C: CPT

## 2020-05-18 PROCEDURE — 87340 HEPATITIS B SURFACE AG IA: CPT

## 2020-05-19 DIAGNOSIS — R76.8 RHEUMATOID FACTOR POSITIVE: Primary | ICD-10-CM

## 2020-05-19 LAB
B BURGDOR IGG+IGM SER-ACNC: <0.91 ISR (ref 0–0.9)
CRYOGLOB RF SER-ACNC: ABNORMAL [IU]/ML
H PYLORI AG STL QL IA: NEGATIVE
HBV CORE AB SER QL: ABNORMAL
HBV CORE IGM SER QL: ABNORMAL
HBV SURFACE AG SER QL: ABNORMAL
HCV AB SER QL: ABNORMAL
RHEUMATOID FACT SER QL LA: POSITIVE

## 2020-05-20 LAB
HCV RNA SERPL NAA+PROBE-ACNC: NORMAL IU/ML
HCV RNA SERPL NAA+PROBE-LOG IU: 6.92 LOG10 IU/ML
RYE IGE QN: NEGATIVE
TEST INFORMATION: NORMAL

## 2020-05-21 ENCOUNTER — TELEPHONE (OUTPATIENT)
Dept: FAMILY MEDICINE CLINIC | Facility: CLINIC | Age: 40
End: 2020-05-21

## 2020-05-21 DIAGNOSIS — G89.29 CHRONIC RIGHT-SIDED LOW BACK PAIN WITH RIGHT-SIDED SCIATICA: Primary | ICD-10-CM

## 2020-05-21 DIAGNOSIS — M54.41 CHRONIC RIGHT-SIDED LOW BACK PAIN WITH RIGHT-SIDED SCIATICA: Primary | ICD-10-CM

## 2020-05-22 ENCOUNTER — TELEMEDICINE (OUTPATIENT)
Dept: GASTROENTEROLOGY | Facility: MEDICAL CENTER | Age: 40
End: 2020-05-22
Payer: COMMERCIAL

## 2020-05-22 ENCOUNTER — TELEPHONE (OUTPATIENT)
Dept: GASTROENTEROLOGY | Facility: MEDICAL CENTER | Age: 40
End: 2020-05-22

## 2020-05-22 DIAGNOSIS — B18.2 HEPATITIS C VIRUS CARRIER STATE (HCC): ICD-10-CM

## 2020-05-22 DIAGNOSIS — R10.84 GENERALIZED ABDOMINAL PAIN: ICD-10-CM

## 2020-05-22 DIAGNOSIS — R63.4 WEIGHT LOSS: ICD-10-CM

## 2020-05-22 DIAGNOSIS — R11.2 NAUSEA AND VOMITING, INTRACTABILITY OF VOMITING NOT SPECIFIED, UNSPECIFIED VOMITING TYPE: Primary | ICD-10-CM

## 2020-05-22 DIAGNOSIS — R10.13 DYSPEPSIA: ICD-10-CM

## 2020-05-22 PROBLEM — B19.20 HEPATITIS C: Status: ACTIVE | Noted: 2020-05-22

## 2020-05-22 PROCEDURE — 99203 OFFICE O/P NEW LOW 30 MIN: CPT | Performed by: INTERNAL MEDICINE

## 2020-05-26 DIAGNOSIS — M79.10 MYALGIA: Primary | ICD-10-CM

## 2020-05-26 RX ORDER — CYCLOBENZAPRINE HCL 5 MG
5 TABLET ORAL 2 TIMES DAILY
Qty: 30 TABLET | Refills: 0 | Status: SHIPPED | OUTPATIENT
Start: 2020-05-26 | End: 2020-06-18 | Stop reason: SDUPTHER

## 2020-05-29 ENCOUNTER — TELEMEDICINE (OUTPATIENT)
Dept: PULMONOLOGY | Facility: CLINIC | Age: 40
End: 2020-05-29
Payer: COMMERCIAL

## 2020-05-29 VITALS — BODY MASS INDEX: 20.14 KG/M2 | HEIGHT: 64 IN | WEIGHT: 118 LBS

## 2020-05-29 DIAGNOSIS — J30.89 ALLERGIC RHINITIS DUE TO OTHER ALLERGIC TRIGGER, UNSPECIFIED SEASONALITY: ICD-10-CM

## 2020-05-29 DIAGNOSIS — J45.40 MODERATE PERSISTENT ASTHMA WITHOUT COMPLICATION: Primary | ICD-10-CM

## 2020-05-29 DIAGNOSIS — F17.210 CIGARETTE NICOTINE DEPENDENCE WITHOUT COMPLICATION: ICD-10-CM

## 2020-05-29 PROBLEM — J30.9 ALLERGIC RHINITIS: Status: ACTIVE | Noted: 2020-05-29

## 2020-05-29 PROCEDURE — 99214 OFFICE O/P EST MOD 30 MIN: CPT | Performed by: INTERNAL MEDICINE

## 2020-05-29 PROCEDURE — 3008F BODY MASS INDEX DOCD: CPT | Performed by: INTERNAL MEDICINE

## 2020-05-29 PROCEDURE — 3008F BODY MASS INDEX DOCD: CPT | Performed by: NURSE PRACTITIONER

## 2020-05-29 RX ORDER — LORATADINE 10 MG/1
10 TABLET ORAL DAILY
Qty: 30 TABLET | Refills: 6 | Status: SHIPPED | OUTPATIENT
Start: 2020-05-29 | End: 2020-11-25

## 2020-05-29 RX ORDER — ALBUTEROL SULFATE 90 UG/1
1 AEROSOL, METERED RESPIRATORY (INHALATION) EVERY 4 HOURS PRN
Qty: 1 INHALER | Refills: 5 | Status: SHIPPED | OUTPATIENT
Start: 2020-05-29 | End: 2020-07-23

## 2020-05-29 RX ORDER — BUDESONIDE AND FORMOTEROL FUMARATE DIHYDRATE 160; 4.5 UG/1; UG/1
2 AEROSOL RESPIRATORY (INHALATION) 2 TIMES DAILY
Qty: 1 INHALER | Refills: 6 | Status: SHIPPED | OUTPATIENT
Start: 2020-05-29 | End: 2020-11-25

## 2020-06-01 ENCOUNTER — PATIENT MESSAGE (OUTPATIENT)
Dept: FAMILY MEDICINE CLINIC | Facility: CLINIC | Age: 40
End: 2020-06-01

## 2020-06-01 DIAGNOSIS — G89.29 CHRONIC GENERALIZED PAIN: ICD-10-CM

## 2020-06-01 DIAGNOSIS — M54.50 LUMBAR PAIN WITH RADIATION DOWN LEG: ICD-10-CM

## 2020-06-01 DIAGNOSIS — R52 CHRONIC GENERALIZED PAIN: ICD-10-CM

## 2020-06-01 DIAGNOSIS — M79.606 LUMBAR PAIN WITH RADIATION DOWN LEG: ICD-10-CM

## 2020-06-02 RX ORDER — GABAPENTIN 300 MG/1
300 CAPSULE ORAL 3 TIMES DAILY
Qty: 90 CAPSULE | Refills: 0 | Status: SHIPPED | OUTPATIENT
Start: 2020-06-02 | End: 2020-06-29 | Stop reason: SDUPTHER

## 2020-06-09 ENCOUNTER — EVALUATION (OUTPATIENT)
Dept: PHYSICAL THERAPY | Facility: CLINIC | Age: 40
End: 2020-06-09
Payer: COMMERCIAL

## 2020-06-09 DIAGNOSIS — M54.41 CHRONIC RIGHT-SIDED LOW BACK PAIN WITH RIGHT-SIDED SCIATICA: Primary | ICD-10-CM

## 2020-06-09 DIAGNOSIS — G89.29 CHRONIC RIGHT-SIDED LOW BACK PAIN WITH RIGHT-SIDED SCIATICA: Primary | ICD-10-CM

## 2020-06-09 PROCEDURE — 97110 THERAPEUTIC EXERCISES: CPT

## 2020-06-09 PROCEDURE — 97161 PT EVAL LOW COMPLEX 20 MIN: CPT

## 2020-06-15 ENCOUNTER — OFFICE VISIT (OUTPATIENT)
Dept: PHYSICAL THERAPY | Facility: CLINIC | Age: 40
End: 2020-06-15
Payer: COMMERCIAL

## 2020-06-15 DIAGNOSIS — G89.29 CHRONIC RIGHT-SIDED LOW BACK PAIN WITH RIGHT-SIDED SCIATICA: Primary | ICD-10-CM

## 2020-06-15 DIAGNOSIS — M54.41 CHRONIC RIGHT-SIDED LOW BACK PAIN WITH RIGHT-SIDED SCIATICA: Primary | ICD-10-CM

## 2020-06-15 PROCEDURE — 97110 THERAPEUTIC EXERCISES: CPT

## 2020-06-15 PROCEDURE — 97112 NEUROMUSCULAR REEDUCATION: CPT

## 2020-06-18 ENCOUNTER — APPOINTMENT (OUTPATIENT)
Dept: PHYSICAL THERAPY | Facility: CLINIC | Age: 40
End: 2020-06-18
Payer: COMMERCIAL

## 2020-06-18 DIAGNOSIS — M79.10 MYALGIA: ICD-10-CM

## 2020-06-18 RX ORDER — CYCLOBENZAPRINE HCL 5 MG
5 TABLET ORAL 2 TIMES DAILY
Qty: 30 TABLET | Refills: 0 | Status: SHIPPED | OUTPATIENT
Start: 2020-06-18 | End: 2020-07-16 | Stop reason: SDUPTHER

## 2020-06-22 ENCOUNTER — OFFICE VISIT (OUTPATIENT)
Dept: PHYSICAL THERAPY | Facility: CLINIC | Age: 40
End: 2020-06-22
Payer: COMMERCIAL

## 2020-06-22 DIAGNOSIS — G89.29 CHRONIC RIGHT-SIDED LOW BACK PAIN WITH RIGHT-SIDED SCIATICA: Primary | ICD-10-CM

## 2020-06-22 DIAGNOSIS — M54.41 CHRONIC RIGHT-SIDED LOW BACK PAIN WITH RIGHT-SIDED SCIATICA: Primary | ICD-10-CM

## 2020-06-22 PROCEDURE — 97112 NEUROMUSCULAR REEDUCATION: CPT

## 2020-06-22 PROCEDURE — 97110 THERAPEUTIC EXERCISES: CPT

## 2020-06-25 ENCOUNTER — OFFICE VISIT (OUTPATIENT)
Dept: PHYSICAL THERAPY | Facility: CLINIC | Age: 40
End: 2020-06-25
Payer: COMMERCIAL

## 2020-06-25 DIAGNOSIS — M54.41 CHRONIC RIGHT-SIDED LOW BACK PAIN WITH RIGHT-SIDED SCIATICA: Primary | ICD-10-CM

## 2020-06-25 DIAGNOSIS — G89.29 CHRONIC RIGHT-SIDED LOW BACK PAIN WITH RIGHT-SIDED SCIATICA: Primary | ICD-10-CM

## 2020-06-25 PROCEDURE — 97112 NEUROMUSCULAR REEDUCATION: CPT

## 2020-06-25 PROCEDURE — 97110 THERAPEUTIC EXERCISES: CPT

## 2020-06-25 PROCEDURE — 97530 THERAPEUTIC ACTIVITIES: CPT

## 2020-06-29 ENCOUNTER — APPOINTMENT (OUTPATIENT)
Dept: PHYSICAL THERAPY | Facility: CLINIC | Age: 40
End: 2020-06-29
Payer: COMMERCIAL

## 2020-06-29 DIAGNOSIS — G89.29 CHRONIC GENERALIZED PAIN: ICD-10-CM

## 2020-06-29 DIAGNOSIS — M79.606 LUMBAR PAIN WITH RADIATION DOWN LEG: ICD-10-CM

## 2020-06-29 DIAGNOSIS — M54.50 LUMBAR PAIN WITH RADIATION DOWN LEG: ICD-10-CM

## 2020-06-29 DIAGNOSIS — R52 CHRONIC GENERALIZED PAIN: ICD-10-CM

## 2020-06-29 RX ORDER — GABAPENTIN 300 MG/1
300 CAPSULE ORAL 3 TIMES DAILY
Qty: 90 CAPSULE | Refills: 0 | Status: SHIPPED | OUTPATIENT
Start: 2020-06-29 | End: 2020-07-23 | Stop reason: SDUPTHER

## 2020-07-16 DIAGNOSIS — M79.10 MYALGIA: ICD-10-CM

## 2020-07-16 DIAGNOSIS — J45.40 MODERATE PERSISTENT ASTHMA WITHOUT COMPLICATION: ICD-10-CM

## 2020-07-16 DIAGNOSIS — B00.1 HERPES LABIALIS: ICD-10-CM

## 2020-07-16 DIAGNOSIS — Z72.0 TOBACCO ABUSE: ICD-10-CM

## 2020-07-16 DIAGNOSIS — B00.1 RECURRENT HERPES LABIALIS: ICD-10-CM

## 2020-07-16 RX ORDER — VARENICLINE TARTRATE 25 MG
KIT ORAL
Qty: 53 TABLET | Refills: 0 | Status: SHIPPED | OUTPATIENT
Start: 2020-07-16 | End: 2021-06-08 | Stop reason: SDUPTHER

## 2020-07-16 RX ORDER — VALACYCLOVIR HYDROCHLORIDE 500 MG/1
500 TABLET, FILM COATED ORAL DAILY
Qty: 30 TABLET | Refills: 0 | Status: SHIPPED | OUTPATIENT
Start: 2020-07-16 | End: 2020-09-16

## 2020-07-16 RX ORDER — CYCLOBENZAPRINE HCL 5 MG
5 TABLET ORAL 2 TIMES DAILY
Qty: 30 TABLET | Refills: 0 | Status: SHIPPED | OUTPATIENT
Start: 2020-07-16 | End: 2020-08-18 | Stop reason: SDUPTHER

## 2020-07-16 RX ORDER — BUDESONIDE AND FORMOTEROL FUMARATE DIHYDRATE 160; 4.5 UG/1; UG/1
2 AEROSOL RESPIRATORY (INHALATION) 2 TIMES DAILY
Qty: 1 INHALER | Refills: 0 | Status: CANCELLED | OUTPATIENT
Start: 2020-07-16

## 2020-07-16 RX ORDER — ACYCLOVIR 50 MG/G
OINTMENT TOPICAL EVERY 4 HOURS
Qty: 15 G | Refills: 0 | Status: SHIPPED | OUTPATIENT
Start: 2020-07-16 | End: 2020-09-02 | Stop reason: CLARIF

## 2020-07-16 RX ORDER — ALBUTEROL SULFATE 90 UG/1
1 AEROSOL, METERED RESPIRATORY (INHALATION) EVERY 4 HOURS PRN
Qty: 1 INHALER | Refills: 0 | Status: CANCELLED | OUTPATIENT
Start: 2020-07-16

## 2020-07-23 DIAGNOSIS — R52 CHRONIC GENERALIZED PAIN: ICD-10-CM

## 2020-07-23 DIAGNOSIS — M54.50 LUMBAR PAIN WITH RADIATION DOWN LEG: ICD-10-CM

## 2020-07-23 DIAGNOSIS — M79.606 LUMBAR PAIN WITH RADIATION DOWN LEG: ICD-10-CM

## 2020-07-23 DIAGNOSIS — J45.40 MODERATE PERSISTENT ASTHMA WITHOUT COMPLICATION: ICD-10-CM

## 2020-07-23 DIAGNOSIS — G89.29 CHRONIC GENERALIZED PAIN: ICD-10-CM

## 2020-07-23 RX ORDER — ALBUTEROL SULFATE 90 UG/1
1 AEROSOL, METERED RESPIRATORY (INHALATION) EVERY 4 HOURS PRN
Qty: 1 INHALER | Refills: 0 | Status: CANCELLED | OUTPATIENT
Start: 2020-07-23

## 2020-07-23 RX ORDER — ALBUTEROL SULFATE 90 UG/1
1 AEROSOL, METERED RESPIRATORY (INHALATION) EVERY 4 HOURS PRN
Qty: 8.5 INHALER | Refills: 4 | Status: SHIPPED | OUTPATIENT
Start: 2020-07-23 | End: 2020-10-19

## 2020-07-23 RX ORDER — GABAPENTIN 300 MG/1
300 CAPSULE ORAL 3 TIMES DAILY
Qty: 90 CAPSULE | Refills: 0 | Status: SHIPPED | OUTPATIENT
Start: 2020-07-23 | End: 2020-08-18 | Stop reason: SDUPTHER

## 2020-07-27 DIAGNOSIS — B37.0 ORAL THRUSH: Primary | ICD-10-CM

## 2020-08-03 ENCOUNTER — HOSPITAL ENCOUNTER (EMERGENCY)
Facility: HOSPITAL | Age: 40
Discharge: HOME | End: 2020-08-03
Attending: EMERGENCY MEDICINE | Admitting: EMERGENCY MEDICINE
Payer: COMMERCIAL

## 2020-08-03 VITALS
RESPIRATION RATE: 18 BRPM | BODY MASS INDEX: 20.89 KG/M2 | OXYGEN SATURATION: 96 % | HEART RATE: 86 BPM | DIASTOLIC BLOOD PRESSURE: 89 MMHG | SYSTOLIC BLOOD PRESSURE: 135 MMHG | TEMPERATURE: 98.1 F | WEIGHT: 121.69 LBS

## 2020-08-03 DIAGNOSIS — L98.9 FINGER LESION: Primary | ICD-10-CM

## 2020-08-03 PROCEDURE — 99283 EMERGENCY DEPT VISIT LOW MDM: CPT | Performed by: EMERGENCY MEDICINE

## 2020-08-03 PROCEDURE — 87255 GENET VIRUS ISOLATE HSV: CPT | Performed by: PHYSICIAN ASSISTANT

## 2020-08-03 PROCEDURE — 99283 EMERGENCY DEPT VISIT LOW MDM: CPT

## 2020-08-03 RX ORDER — VALACYCLOVIR HYDROCHLORIDE 500 MG/1
500 TABLET, FILM COATED ORAL 2 TIMES DAILY
Qty: 14 TABLET | Refills: 0 | Status: SHIPPED | OUTPATIENT
Start: 2020-08-03 | End: 2020-09-02 | Stop reason: CLARIF

## 2020-08-03 NOTE — ED PROVIDER NOTES
History  Chief Complaint   Patient presents with    Nail Problem     reports itching under nail bed, blisters on fingers  Patient is a 79-year-old female with past medical history of anxiety, arthritis, asthma/COPD, GERD, CAD who presents for evaluation of finger lesions  Patient states that she started about 3 weeks ago with some itching and peeling/blistering under the nails and to the distal fingertips on her left thumb  She states that this seemed to improve and then it started on her right thumb  She states that now she is also noticing it on her index fingers  She states that her right thumb is tender and it feels like she has herpes on her fingers "  She states she does get cold sores  She does take a daily suppressive dose of Valtrex which she just started about a month ago  She denies any known injury or trauma  No rash or lesions elsewhere  She denies bleeding or drainage from the vesicles/blisters  She does not have any active cold sores currently  She denies any new soaps, lotions, detergents, foods or medications  She does her own nails and states that she has not used any new products  She denies fever, chills, nausea vomiting diarrhea, chest pain, shortness breath abdominal pain, joint pain, numbness or weakness  Prior to Admission Medications   Prescriptions Last Dose Informant Patient Reported? Taking?    EPINEPHrine (EPIPEN) 0 3 mg/0 3 mL SOAJ   No No   Sig: Inject 0 3 mL (0 3 mg total) into a muscle once for 1 dose   SUMAtriptan (IMITREX) 50 mg tablet   No No   Sig: Take 1 tablet (50 mg total) by mouth once as needed for migraine   acyclovir (Zovirax) 5 % ointment   No No   Sig: Apply topically every 4 (four) hours   albuterol (2 5 mg/3 mL) 0 083 % nebulizer solution   Yes No   Sig: Inhale 1 each every 6 (six) hours as needed   albuterol (PROVENTIL HFA,VENTOLIN HFA) 90 mcg/act inhaler   No No   Sig: INHALE 1 PUFF EVERY 4 (FOUR) HOURS AS NEEDED FOR WHEEZING budesonide-formoterol (SYMBICORT) 160-4 5 mcg/act inhaler   No No   Sig: Inhale 2 puffs 2 (two) times a day Rinse mouth after use     cyclobenzaprine (FLEXERIL) 5 mg tablet   No No   Sig: Take 1 tablet (5 mg total) by mouth 2 (two) times a day   diclofenac sodium (VOLTAREN) 1 %   No No   Sig: Apply 2 g topically 4 (four) times a day   dicyclomine (BENTYL) 20 mg tablet   No No   Sig: Take 1 tablet (20 mg total) by mouth every 6 (six) hours as needed (stomach cramps)   gabapentin (NEURONTIN) 300 mg capsule   No No   Sig: Take 1 capsule (300 mg total) by mouth 3 (three) times a day   ibuprofen (MOTRIN) 600 mg tablet   Yes No   Sig: Take by mouth   lidocaine (LIDODERM) 5 %   No No   Sig: Apply 1 patch topically daily Remove & Discard patch within 12 hours or as directed by MD   loratadine (CLARITIN) 10 mg tablet   No No   Sig: Take 1 tablet (10 mg total) by mouth daily   montelukast (Singulair) 10 mg tablet   No No   Sig: Take 1 tablet (10 mg total) by mouth daily at bedtime   naproxen sodium (ANAPROX) 550 mg tablet   Yes No   Sig: Take by mouth   nystatin (MYCOSTATIN) 500,000 units/5 mL suspension   No No   Sig: Apply 5 mL (500,000 Units total) to the mouth or throat 4 (four) times a day   omeprazole (PriLOSEC) 40 MG capsule   No No   Sig: Take 1 capsule (40 mg total) by mouth daily   ondansetron (ZOFRAN-ODT) 4 mg disintegrating tablet   Yes No   Sig: Take by mouth   prochlorperazine (COMPAZINE) 10 mg tablet   No No   Sig: Take 1 tablet (10 mg total) by mouth 3 (three) times a day   valACYclovir (VALTREX) 1,000 mg tablet   No No   Sig: Take 2 tablets (2,000 mg total) by mouth 2 (two) times a day for 1 day   valACYclovir (VALTREX) 500 mg tablet   No No   Sig: Take 1 tablet (500 mg total) by mouth daily   varenicline (CHANTIX COLLIN) 0 5 MG X 11 & 1 MG X 42 tablet   No No   Sig: Take one 0 5mg tab by mouth 1x daily for 3 days, then increase to one 0 5mg tab 2x daily for 3 days, then increase to one 1mg tab 2x daily Facility-Administered Medications: None       Past Medical History:   Diagnosis Date    Anemia     Anxiety     Arthritis     Asthma     COPD (chronic obstructive pulmonary disease) ([place] [address] )     Coronary artery disease     Disease of thyroid gland     GERD (gastroesophageal reflux disease)     Heart murmur     Hypertension     Hypothyroidism     Infectious viral hepatitis     Mitral valve regurgitation     Myocardial infarction ([place] [address] )     Pneumonia     Substance abuse ([place] [address] )     herion last used 3 5 years     Tobacco abuse     Urinary tract infection        Past Surgical History:   Procedure Laterality Date    APPENDECTOMY      OVARIAN CYST REMOVAL         Family History   Problem Relation Age of Onset    COPD Mother     No Known Problems Father     Asthma Brother     No Known Problems Brother      I have reviewed and agree with the history as documented  E-Cigarette/Vaping    E-Cigarette Use Never User      E-Cigarette/Vaping Substances     Social History     Tobacco Use    Smoking status: Current Every Day Smoker     Packs/day: 0 50     Years: 25 00     Pack years: 12 50     Types: Cigarettes     Start date: 1995    Smokeless tobacco: Never Used   Substance Use Topics    Alcohol use: No    Drug use: Not Currently     Comment: gel for arthrisis       Review of Systems   Constitutional: Negative for chills and fever  Respiratory: Negative for cough and shortness of breath  Cardiovascular: Negative for chest pain  Gastrointestinal: Negative for abdominal pain, diarrhea, nausea and vomiting  Musculoskeletal: Negative for arthralgias  Skin: Positive for wound  Neurological: Negative for weakness and numbness  All other systems reviewed and are negative  Physical Exam  Physical Exam  Vitals signs and nursing note reviewed  Constitutional:       General: She is not in acute distress  Appearance: Normal appearance   She is not ill-appearing, toxic-appearing or diaphoretic  HENT:      Head: Normocephalic and atraumatic  Neck:      Musculoskeletal: Normal range of motion  Cardiovascular:      Rate and Rhythm: Normal rate and regular rhythm  Heart sounds: Normal heart sounds  No murmur  Pulmonary:      Effort: Pulmonary effort is normal  No respiratory distress  Breath sounds: Normal breath sounds  No stridor  No wheezing, rhonchi or rales  Musculoskeletal: Normal range of motion  Skin:     General: Skin is warm  Capillary Refill: Capillary refill takes less than 2 seconds  Comments: Right thumb distal finger tip and just beneath the nail with erythematous tender vesicles  No active bleeding or drainage  Left thumb with small healing vesicles  No other rash or lesions noted  Neurovascular intact distally  Full range of motion of the fingers  Neurological:      Mental Status: She is alert  Psychiatric:         Mood and Affect: Mood normal          Vital Signs  ED Triage Vitals [08/03/20 1830]   Temperature Pulse Respirations Blood Pressure SpO2   98 1 °F (36 7 °C) 86 18 135/89 96 %      Temp Source Heart Rate Source Patient Position - Orthostatic VS BP Location FiO2 (%)   Temporal Monitor Sitting Left arm --      Pain Score       6           Vitals:    08/03/20 1830   BP: 135/89   Pulse: 86   Patient Position - Orthostatic VS: Sitting         Visual Acuity      ED Medications  Medications - No data to display    Diagnostic Studies  Results Reviewed     Procedure Component Value Units Date/Time    Herpes simplex virus culture [[id number]] Collected:  08/03/20 1923    Lab Status: In process Specimen:  Other from Lesion Updated:  08/03/20 1925                 No orders to display              Procedures  Procedures         ED Course       US AUDIT      Most Recent Value   Initial Alcohol Screen: US AUDIT-C    1  How often do you have a drink containing alcohol?  0 Filed at: 08/03/2020 1831   2   How many drinks containing alcohol do you have on a typical day you are drinking? 0 Filed at: 08/03/2020 1831   3a  Male UNDER 65: How often do you have five or more drinks on one occasion? 0 Filed at: 08/03/2020 1831   3b  FEMALE Any Age, or MALE 65+: How often do you have 4 or more drinks on one occassion? 0 Filed at: 08/03/2020 1831   Audit-C Score  0 Filed at: 08/03/2020 1831                  TERRY/DAST-10      Most Recent Value   How many times in the past year have you    Used an illegal drug or used a prescription medication for non-medical reasons? Never Filed at: 08/03/2020 1831                                MDM  Number of Diagnoses or Management Options  Finger lesion:   Diagnosis management comments: Differential diagnosis includes but is not limited to herpetic fish, dyshidrotic eczema, paronychia, fungal infection, dermatitis  Will attempt to send a HSV viral culture from the thumb lesion  Discussed concern for possible herpetic fish  Will increase her suppressive Valtrex dosage  Instructed patient to follow up with family doctor as well as either hand surgeon or Dermatology  Return to the ED if symptoms worsen or new symptoms arise  Patient states understanding and agrees with plan  Patient Progress  Patient progress: stable        Disposition  Final diagnoses:   Finger lesion     Time reflects when diagnosis was documented in both MDM as applicable and the Disposition within this note     Time User Action Codes Description Comment    8/3/2020  7:14 PM Liv Dallas Add [L98 9] Finger lesion       ED Disposition     ED Disposition Condition Date/Time Comment    Discharge Stable Mon Aug 3, 2020  7:13 PM Michele Clarke discharge to home/self care              Follow-up Information     Follow up With Specialties Details Why Contact Info Additional Information    Otilia Boo, [address] Nurse Practitioner Schedule an appointment as soon as possible for a visit in 1 day  [address] [facility] [address] [facility] D[facility]at[facility] Schedule an appointment as soon as possible for a visit   [address] [facility], [address] [place], [place], [address] [facility]    Maria Guadalupe Stauffer MD Orthopedic Surgery, Hand Surgery Schedule an appointment as soon as possible for a visit   Houma Nathalia  [address] [facility]       [address] [facility] Emergency Department Emergency Medicine  If symptoms worsen [address] [address] [phone] [place] ED, [address]  , [place], [place], [address]          Discharge Medication List as of 8/3/2020  7:25 PM      CONTINUE these medications which have CHANGED    Details   !! valACYclovir (VALTREX) 500 mg tablet Take 1 tablet (500 mg total) by mouth 2 (two) times a day for 7 days, Starting Mon 8/3/2020, Until Mon 8/10/2020, Normal       !! - Potential duplicate medications found  Please discuss with provider        CONTINUE these medications which have NOT CHANGED    Details   acyclovir (Zovirax) 5 % ointment Apply topically every 4 (four) hours, Starting Thu 7/16/2020, Normal      albuterol (2 5 mg/3 mL) 0 083 % nebulizer solution Inhale 1 each every 6 (six) hours as needed, Starting Wed 9/17/2014, Historical Med      albuterol (PROVENTIL HFA,VENTOLIN HFA) 90 mcg/act inhaler INHALE 1 PUFF EVERY 4 (FOUR) HOURS AS NEEDED FOR WHEEZING, Starting Thu 7/23/2020, Normal      budesonide-formoterol (SYMBICORT) 160-4 5 mcg/act inhaler Inhale 2 puffs 2 (two) times a day Rinse mouth after use , Starting Fri 5/29/2020, Normal      cyclobenzaprine (FLEXERIL) 5 mg tablet Take 1 tablet (5 mg total) by mouth 2 (two) times a day, Starting Thu 7/16/2020, Normal      diclofenac sodium (VOLTAREN) 1 % Apply 2 g topically 4 (four) times a day, Starting Sun 12/29/2019, Print      dicyclomine (BENTYL) 20 mg tablet Take 1 tablet (20 mg total) by mouth every 6 (six) hours as needed (stomach cramps), Starting Wed 11/27/2019, Normal      EPINEPHrine (EPIPEN) 0 3 mg/0 3 mL SOAJ Inject 0 3 mL (0 3 mg total) into a muscle once for 1 dose, Starting Wed 11/27/2019, Print      gabapentin (NEURONTIN) 300 mg capsule Take 1 capsule (300 mg total) by mouth 3 (three) times a day, Starting Thu 7/23/2020, Normal      ibuprofen (MOTRIN) 600 mg tablet Take by mouth, Starting Thu 6/11/2015, Historical Med      lidocaine (LIDODERM) 5 % Apply 1 patch topically daily Remove & Discard patch within 12 hours or as directed by MD, Starting Sun 12/29/2019, Print      loratadine (CLARITIN) 10 mg tablet Take 1 tablet (10 mg total) by mouth daily, Starting Fri 5/29/2020, Normal      montelukast (Singulair) 10 mg tablet Take 1 tablet (10 mg total) by mouth daily at bedtime, Starting Fri 5/8/2020, Normal      naproxen sodium (ANAPROX) 550 mg tablet Take by mouth, Starting Wed 3/16/2016, Historical Med      nystatin (MYCOSTATIN) 500,000 units/5 mL suspension Apply 5 mL (500,000 Units total) to the mouth or throat 4 (four) times a day, Starting Mon 7/27/2020, Normal      omeprazole (PriLOSEC) 40 MG capsule Take 1 capsule (40 mg total) by mouth daily, Starting Fri 5/8/2020, Normal      ondansetron (ZOFRAN-ODT) 4 mg disintegrating tablet Take by mouth, Starting Sun 6/26/2016, Historical Med      prochlorperazine (COMPAZINE) 10 mg tablet Take 1 tablet (10 mg total) by mouth 3 (three) times a day, Starting Wed 11/27/2019, Normal      SUMAtriptan (IMITREX) 50 mg tablet Take 1 tablet (50 mg total) by mouth once as needed for migraine, Starting Fri 5/8/2020, Normal      !! valACYclovir (VALTREX) 500 mg tablet Take 1 tablet (500 mg total) by mouth daily, Starting Thu 7/16/2020, Until Fri 11/13/2020, Normal      varenicline (CHANTIX COLLIN) 0 5 MG X 11 & 1 MG X 42 tablet Take one 0 5mg tab by mouth 1x daily for 3 days, then increase to one 0 5mg tab 2x daily for 3 days, then increase to one 1mg tab 2x daily, Normal       !! - Potential duplicate medications found  Please discuss with provider  No discharge procedures on file      PDMP Review     None          ED Provider  Electronically Signed by           Jayden Cesar PA-C  08/03/20 2361

## 2020-08-06 LAB — HSV SPEC CULT: NORMAL

## 2020-08-18 DIAGNOSIS — M79.606 LUMBAR PAIN WITH RADIATION DOWN LEG: ICD-10-CM

## 2020-08-18 DIAGNOSIS — R52 CHRONIC GENERALIZED PAIN: ICD-10-CM

## 2020-08-18 DIAGNOSIS — G89.29 CHRONIC GENERALIZED PAIN: ICD-10-CM

## 2020-08-18 DIAGNOSIS — M54.50 LUMBAR PAIN WITH RADIATION DOWN LEG: ICD-10-CM

## 2020-08-18 DIAGNOSIS — M79.10 MYALGIA: ICD-10-CM

## 2020-08-19 RX ORDER — CYCLOBENZAPRINE HCL 5 MG
5 TABLET ORAL 2 TIMES DAILY
Qty: 30 TABLET | Refills: 0 | Status: SHIPPED | OUTPATIENT
Start: 2020-08-19 | End: 2020-09-18 | Stop reason: SDUPTHER

## 2020-08-19 RX ORDER — GABAPENTIN 300 MG/1
300 CAPSULE ORAL 3 TIMES DAILY
Qty: 90 CAPSULE | Refills: 0 | Status: SHIPPED | OUTPATIENT
Start: 2020-08-19 | End: 2020-09-01

## 2020-09-01 DIAGNOSIS — M79.7 FIBROMYALGIA: Primary | ICD-10-CM

## 2020-09-01 RX ORDER — PREGABALIN 75 MG/1
75 CAPSULE ORAL 2 TIMES DAILY
Qty: 60 CAPSULE | Refills: 0 | Status: SHIPPED | OUTPATIENT
Start: 2020-09-01 | End: 2020-09-28 | Stop reason: SDUPTHER

## 2020-09-02 ENCOUNTER — APPOINTMENT (EMERGENCY)
Dept: CT IMAGING | Facility: HOSPITAL | Age: 40
End: 2020-09-02
Payer: COMMERCIAL

## 2020-09-02 ENCOUNTER — APPOINTMENT (EMERGENCY)
Dept: RADIOLOGY | Facility: HOSPITAL | Age: 40
End: 2020-09-02
Payer: COMMERCIAL

## 2020-09-02 ENCOUNTER — HOSPITAL ENCOUNTER (EMERGENCY)
Facility: HOSPITAL | Age: 40
Discharge: LEFT BEFORE DISCHARGE | End: 2020-09-02
Attending: EMERGENCY MEDICINE | Admitting: EMERGENCY MEDICINE
Payer: COMMERCIAL

## 2020-09-02 ENCOUNTER — HOSPITAL ENCOUNTER (OUTPATIENT)
Facility: HOSPITAL | Age: 40
Setting detail: OBSERVATION
Discharge: HOME | End: 2020-09-04
Attending: EMERGENCY MEDICINE | Admitting: INTERNAL MEDICINE
Payer: COMMERCIAL

## 2020-09-02 VITALS
HEART RATE: 85 BPM | DIASTOLIC BLOOD PRESSURE: 82 MMHG | TEMPERATURE: 97 F | SYSTOLIC BLOOD PRESSURE: 130 MMHG | RESPIRATION RATE: 17 BRPM | OXYGEN SATURATION: 97 %

## 2020-09-02 DIAGNOSIS — M54.50 LOW BACK PAIN: ICD-10-CM

## 2020-09-02 DIAGNOSIS — R25.1 TREMORS OF NERVOUS SYSTEM: ICD-10-CM

## 2020-09-02 DIAGNOSIS — M62.838 MUSCLE SPASM: Primary | ICD-10-CM

## 2020-09-02 DIAGNOSIS — R26.2 AMBULATORY DYSFUNCTION: ICD-10-CM

## 2020-09-02 DIAGNOSIS — R53.1 WEAKNESS: ICD-10-CM

## 2020-09-02 PROBLEM — M06.9 RHEUMATOID ARTHRITIS (HCC): Status: ACTIVE | Noted: 2020-09-02

## 2020-09-02 LAB
ALBUMIN SERPL BCP-MCNC: 3.5 G/DL (ref 3.5–5)
ALP SERPL-CCNC: 24 U/L (ref 46–116)
ALT SERPL W P-5'-P-CCNC: 72 U/L (ref 12–78)
ANION GAP SERPL CALCULATED.3IONS-SCNC: 4 MMOL/L (ref 4–13)
AST SERPL W P-5'-P-CCNC: 45 U/L (ref 5–45)
ATRIAL RATE: 80 BPM
BASOPHILS # BLD AUTO: 0.05 THOUSANDS/ΜL (ref 0–0.1)
BASOPHILS NFR BLD AUTO: 1 % (ref 0–1)
BILIRUB SERPL-MCNC: 0.52 MG/DL (ref 0.2–1)
BUN SERPL-MCNC: 17 MG/DL (ref 5–25)
CALCIUM SERPL-MCNC: 8.4 MG/DL (ref 8.3–10.1)
CHLORIDE SERPL-SCNC: 107 MMOL/L (ref 100–108)
CO2 SERPL-SCNC: 29 MMOL/L (ref 21–32)
CREAT SERPL-MCNC: 0.82 MG/DL (ref 0.6–1.3)
EOSINOPHIL # BLD AUTO: 0.15 THOUSAND/ΜL (ref 0–0.61)
EOSINOPHIL NFR BLD AUTO: 2 % (ref 0–6)
ERYTHROCYTE [DISTWIDTH] IN BLOOD BY AUTOMATED COUNT: 12.5 % (ref 11.6–15.1)
GFR SERPL CREATININE-BSD FRML MDRD: 91 ML/MIN/1.73SQ M
GLUCOSE SERPL-MCNC: 75 MG/DL (ref 65–140)
HCT VFR BLD AUTO: 41 % (ref 34.8–46.1)
HGB BLD-MCNC: 13.4 G/DL (ref 11.5–15.4)
IMM GRANULOCYTES # BLD AUTO: 0.03 THOUSAND/UL (ref 0–0.2)
IMM GRANULOCYTES NFR BLD AUTO: 0 % (ref 0–2)
LYMPHOCYTES # BLD AUTO: 2.04 THOUSANDS/ΜL (ref 0.6–4.47)
LYMPHOCYTES NFR BLD AUTO: 30 % (ref 14–44)
MAGNESIUM SERPL-MCNC: 1.9 MG/DL (ref 1.6–2.6)
MCH RBC QN AUTO: 30.8 PG (ref 26.8–34.3)
MCHC RBC AUTO-ENTMCNC: 32.7 G/DL (ref 31.4–37.4)
MCV RBC AUTO: 94 FL (ref 82–98)
MONOCYTES # BLD AUTO: 0.39 THOUSAND/ΜL (ref 0.17–1.22)
MONOCYTES NFR BLD AUTO: 6 % (ref 4–12)
NEUTROPHILS # BLD AUTO: 4.16 THOUSANDS/ΜL (ref 1.85–7.62)
NEUTS SEG NFR BLD AUTO: 61 % (ref 43–75)
NRBC BLD AUTO-RTO: 0 /100 WBCS
P AXIS: 80 DEGREES
PLATELET # BLD AUTO: 190 THOUSANDS/UL (ref 149–390)
PMV BLD AUTO: 10.2 FL (ref 8.9–12.7)
POTASSIUM SERPL-SCNC: 3.8 MMOL/L (ref 3.5–5.3)
PR INTERVAL: 126 MS
PROT SERPL-MCNC: 6.8 G/DL (ref 6.4–8.2)
QRS AXIS: 89 DEGREES
QRSD INTERVAL: 72 MS
QT INTERVAL: 388 MS
QTC INTERVAL: 447 MS
RBC # BLD AUTO: 4.35 MILLION/UL (ref 3.81–5.12)
SODIUM SERPL-SCNC: 140 MMOL/L (ref 136–145)
T WAVE AXIS: 61 DEGREES
TSH SERPL DL<=0.05 MIU/L-ACNC: 1.66 UIU/ML (ref 0.36–3.74)
VENTRICULAR RATE: 80 BPM
WBC # BLD AUTO: 6.82 THOUSAND/UL (ref 4.31–10.16)

## 2020-09-02 PROCEDURE — NC001 PR NO CHARGE: Performed by: PHYSICIAN ASSISTANT

## 2020-09-02 PROCEDURE — 84443 ASSAY THYROID STIM HORMONE: CPT | Performed by: PHYSICIAN ASSISTANT

## 2020-09-02 PROCEDURE — 93010 ELECTROCARDIOGRAM REPORT: CPT | Performed by: INTERNAL MEDICINE

## 2020-09-02 PROCEDURE — 70450 CT HEAD/BRAIN W/O DYE: CPT

## 2020-09-02 PROCEDURE — 73564 X-RAY EXAM KNEE 4 OR MORE: CPT

## 2020-09-02 PROCEDURE — 96374 THER/PROPH/DIAG INJ IV PUSH: CPT

## 2020-09-02 PROCEDURE — 99285 EMERGENCY DEPT VISIT HI MDM: CPT | Performed by: PHYSICIAN ASSISTANT

## 2020-09-02 PROCEDURE — 80053 COMPREHEN METABOLIC PANEL: CPT | Performed by: PHYSICIAN ASSISTANT

## 2020-09-02 PROCEDURE — G1004 CDSM NDSC: HCPCS

## 2020-09-02 PROCEDURE — 83735 ASSAY OF MAGNESIUM: CPT | Performed by: PHYSICIAN ASSISTANT

## 2020-09-02 PROCEDURE — 93005 ELECTROCARDIOGRAM TRACING: CPT

## 2020-09-02 PROCEDURE — 85025 COMPLETE CBC W/AUTO DIFF WBC: CPT | Performed by: PHYSICIAN ASSISTANT

## 2020-09-02 PROCEDURE — 99285 EMERGENCY DEPT VISIT HI MDM: CPT

## 2020-09-02 PROCEDURE — 96360 HYDRATION IV INFUSION INIT: CPT

## 2020-09-02 PROCEDURE — 36415 COLL VENOUS BLD VENIPUNCTURE: CPT | Performed by: PHYSICIAN ASSISTANT

## 2020-09-02 PROCEDURE — 99220 PR INITIAL OBSERVATION CARE/DAY 70 MINUTES: CPT | Performed by: INTERNAL MEDICINE

## 2020-09-02 RX ORDER — KETOROLAC TROMETHAMINE 30 MG/ML
15 INJECTION, SOLUTION INTRAMUSCULAR; INTRAVENOUS EVERY 6 HOURS PRN
Status: DISCONTINUED | OUTPATIENT
Start: 2020-09-02 | End: 2020-09-03

## 2020-09-02 RX ORDER — BUDESONIDE AND FORMOTEROL FUMARATE DIHYDRATE 160; 4.5 UG/1; UG/1
2 AEROSOL RESPIRATORY (INHALATION) 2 TIMES DAILY
Status: DISCONTINUED | OUTPATIENT
Start: 2020-09-02 | End: 2020-09-04 | Stop reason: HOSPADM

## 2020-09-02 RX ORDER — ACETAMINOPHEN 325 MG/1
650 TABLET ORAL ONCE
Status: COMPLETED | OUTPATIENT
Start: 2020-09-02 | End: 2020-09-02

## 2020-09-02 RX ORDER — ALBUTEROL SULFATE 90 UG/1
2 AEROSOL, METERED RESPIRATORY (INHALATION) EVERY 4 HOURS PRN
Status: DISCONTINUED | OUTPATIENT
Start: 2020-09-02 | End: 2020-09-04 | Stop reason: HOSPADM

## 2020-09-02 RX ORDER — MONTELUKAST SODIUM 10 MG/1
10 TABLET ORAL
Status: DISCONTINUED | OUTPATIENT
Start: 2020-09-02 | End: 2020-09-04 | Stop reason: HOSPADM

## 2020-09-02 RX ORDER — LORATADINE 10 MG/1
10 TABLET ORAL DAILY
Status: DISCONTINUED | OUTPATIENT
Start: 2020-09-03 | End: 2020-09-04 | Stop reason: HOSPADM

## 2020-09-02 RX ORDER — DIAZEPAM 5 MG/ML
1 SYRINGE (ML) INJECTION ONCE
Status: COMPLETED | OUTPATIENT
Start: 2020-09-02 | End: 2020-09-02

## 2020-09-02 RX ORDER — ONDANSETRON 2 MG/ML
4 INJECTION INTRAMUSCULAR; INTRAVENOUS EVERY 4 HOURS PRN
Status: DISCONTINUED | OUTPATIENT
Start: 2020-09-02 | End: 2020-09-04 | Stop reason: HOSPADM

## 2020-09-02 RX ORDER — LIDOCAINE 50 MG/G
1 PATCH TOPICAL ONCE
Status: DISCONTINUED | OUTPATIENT
Start: 2020-09-02 | End: 2020-09-02 | Stop reason: HOSPADM

## 2020-09-02 RX ORDER — METHOCARBAMOL 500 MG/1
500 TABLET, FILM COATED ORAL ONCE
Status: COMPLETED | OUTPATIENT
Start: 2020-09-02 | End: 2020-09-02

## 2020-09-02 RX ORDER — ACETAMINOPHEN 325 MG/1
650 TABLET ORAL EVERY 6 HOURS PRN
Status: DISCONTINUED | OUTPATIENT
Start: 2020-09-02 | End: 2020-09-03

## 2020-09-02 RX ORDER — KETOROLAC TROMETHAMINE 30 MG/ML
15 INJECTION, SOLUTION INTRAMUSCULAR; INTRAVENOUS ONCE
Status: DISCONTINUED | OUTPATIENT
Start: 2020-09-02 | End: 2020-09-02

## 2020-09-02 RX ORDER — SODIUM CHLORIDE 9 MG/ML
84 INJECTION, SOLUTION INTRAVENOUS CONTINUOUS
Status: DISCONTINUED | OUTPATIENT
Start: 2020-09-02 | End: 2020-09-03

## 2020-09-02 RX ORDER — VALACYCLOVIR HYDROCHLORIDE 500 MG/1
500 TABLET, FILM COATED ORAL DAILY
Status: DISCONTINUED | OUTPATIENT
Start: 2020-09-03 | End: 2020-09-04 | Stop reason: HOSPADM

## 2020-09-02 RX ORDER — KETOROLAC TROMETHAMINE 30 MG/ML
15 INJECTION, SOLUTION INTRAMUSCULAR; INTRAVENOUS ONCE
Status: COMPLETED | OUTPATIENT
Start: 2020-09-02 | End: 2020-09-02

## 2020-09-02 RX ORDER — NICOTINE 21 MG/24HR
1 PATCH, TRANSDERMAL 24 HOURS TRANSDERMAL DAILY
Status: DISCONTINUED | OUTPATIENT
Start: 2020-09-03 | End: 2020-09-04 | Stop reason: HOSPADM

## 2020-09-02 RX ORDER — OXYCODONE HYDROCHLORIDE 5 MG/1
5 TABLET ORAL EVERY 4 HOURS PRN
Status: DISCONTINUED | OUTPATIENT
Start: 2020-09-02 | End: 2020-09-04 | Stop reason: HOSPADM

## 2020-09-02 RX ORDER — TIZANIDINE 4 MG/1
4 TABLET ORAL 3 TIMES DAILY
Status: DISCONTINUED | OUTPATIENT
Start: 2020-09-02 | End: 2020-09-04 | Stop reason: HOSPADM

## 2020-09-02 RX ORDER — PANTOPRAZOLE SODIUM 40 MG/1
40 TABLET, DELAYED RELEASE ORAL
Status: DISCONTINUED | OUTPATIENT
Start: 2020-09-03 | End: 2020-09-04 | Stop reason: HOSPADM

## 2020-09-02 RX ADMIN — PREGABALIN 75 MG: 25 CAPSULE ORAL at 20:10

## 2020-09-02 RX ADMIN — MORPHINE SULFATE 2 MG: 2 INJECTION, SOLUTION INTRAMUSCULAR; INTRAVENOUS at 20:08

## 2020-09-02 RX ADMIN — ENOXAPARIN SODIUM 40 MG: 40 INJECTION SUBCUTANEOUS at 20:05

## 2020-09-02 RX ADMIN — METHOCARBAMOL 500 MG: 500 TABLET ORAL at 14:10

## 2020-09-02 RX ADMIN — SODIUM CHLORIDE 84 ML/HR: 0.9 INJECTION, SOLUTION INTRAVENOUS at 20:20

## 2020-09-02 RX ADMIN — SODIUM CHLORIDE 1000 ML: 0.9 INJECTION, SOLUTION INTRAVENOUS at 17:53

## 2020-09-02 RX ADMIN — LIDOCAINE 1 PATCH: 50 PATCH CUTANEOUS at 14:08

## 2020-09-02 RX ADMIN — ACETAMINOPHEN 650 MG: 325 TABLET ORAL at 14:10

## 2020-09-02 RX ADMIN — KETOROLAC TROMETHAMINE 15 MG: 30 INJECTION, SOLUTION INTRAMUSCULAR at 15:45

## 2020-09-02 RX ADMIN — BUDESONIDE AND FORMOTEROL FUMARATE DIHYDRATE 2 PUFF: 160; 4.5 AEROSOL RESPIRATORY (INHALATION) at 20:05

## 2020-09-02 RX ADMIN — TIZANIDINE 4 MG: 4 TABLET ORAL at 20:10

## 2020-09-02 RX ADMIN — OXYCODONE HYDROCHLORIDE 5 MG: 5 TABLET ORAL at 22:26

## 2020-09-02 NOTE — DISCHARGE INSTRUCTIONS
Your workup here in the ER was negative for any acute abnormality  However, your tremors and muscle spasms persisted to the point that you had difficulty walking  You are encouraged to stay here in the hospital for further workup  However, at the very least please follow-up with neurology and your [facility] provider in regard to your symptoms and ER visit today  You have been provided the contact information for [facility] Neurology and are encouraged to call and schedule an appointment  You have also been given the contact information for [facility]  Please follow-up in regard to your back pain with the spine center and your PCP  You may take tylenol and motrin as needed for your pain  Please return to the ER should you experience worsening symptoms including persistent/worsening tremors spasms, inability to walk, frequent falls, weakness, numbness, headaches, episodes of passing out, vision changes, loss of control over your bowel/bladder, fevers

## 2020-09-02 NOTE — ED ATTENDING ATTESTATION
9/2/2020  IBree DO, saw and evaluated the patient  I have discussed the patient with the resident/non-physician practitioner and agree with the resident's/non-physician practitioner's findings, Plan of Care, and MDM as documented in the resident's/non-physician practitioner's note, except where noted  All available labs and Radiology studies were reviewed  I was present for key portions of any procedure(s) performed by the resident/non-physician practitioner and I was immediately available to provide assistance  At this point I agree with the current assessment done in the Emergency Department  I have conducted an independent evaluation of this patient a history and physical is as follows:    ED Course     47 y o  F p/w tremors/dizziness  Pt was seen here earlier for tremors and unsteady gait  She had labs/CT head done and was supposed to be admitted, but went outside to smoke a cigarette, and now is back for admission  Pt reports she has "involuntary movements" since she was 21 and gets episodes 1-2 times a year  She reports she mentioned it to other doctors but was "blown off "  Today, this occurred while at work and 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

## 2020-09-02 NOTE — ASSESSMENT & PLAN NOTE
· Rheumatoid arthritis scheduled to see Rheumatology later this month  · Does have chronic pain on pregabalin  Reviewed on   · Given whole body pain with spasms will check CK in a m

## 2020-09-02 NOTE — ASSESSMENT & PLAN NOTE
· Tremors of nervous system  Patient reports episodes happen 3-4 times yearly  Usually spontaneously resolves but this time happened at work  · Will check MRI of brain and have neurology evaluate  Patient denies any increased anxiety  No evidence of electrolyte disturbances  TSH within limits  · Was given diazepam on route and attempt to walk patient in ED unsuccessful with ambulatory dysfunction    · Have PT evaluate

## 2020-09-02 NOTE — ED ATTENDING ATTESTATION
9/2/2020  I, Miri Jacques DO, saw and evaluated the patient  I have discussed the patient with the resident/non-physician practitioner and agree with the resident's/non-physician practitioner's findings, Plan of Care, and MDM as documented in the resident's/non-physician practitioner's note, except where noted  All available labs and Radiology studies were reviewed  I was present for key portions of any procedure(s) performed by the resident/non-physician practitioner and I was immediately available to provide assistance  At this point I agree with the current assessment done in the Emergency Department  I have conducted an independent evaluation of this patient a history and physical is as follows:    48 yo F presenting for evaluation of abnormal movements  Pt states she has been having these issues since she was 21  This occurs 2-3x/year and is random  She states it typically happens at home and she rests and it resolves, however today's episode occurred at work  She states that it starts in her R hand where she has clenching/movements and then this moves up her arm, across her torso to her LUE and then down to her legs "when it is really bad"  When this happened at work it involved her entire body and caused her to fall  Denies striking her head  No LOC  She came to ED via ambulance and received 4 mg Valium  She currently just complains of feeling tired from the Valium, no current movements  She states she saw PCP regarding these episodes in the past but never evaluated, has never seen neurology      MDM: 48 yo F with abnormal movements, will get labs to r/o metabolic derangement, CTH to r/o space occupying lesion, PCP/neuro f/u    ED Course         Critical Care Time  Procedures

## 2020-09-02 NOTE — ED PROVIDER NOTES
History  Chief Complaint   Patient presents with    Weakness - Generalized     pt reports just left AMA to smoke a cigarette and is back to be admitted  reports weakness and tremors, soreness, and unsteadiness      Pt is a 48 yo female with a PMH significant for chronic low back pain with sciatica, hypothyroidism, HSV, allergies, COPD, tobacco abuse, fibromyalgia, and GERD who returns to the ER with complaints of persistent muscle spasms, tremors, unsteady gait, and now dizziness x 1 day  Spasms start in right upper extremity and progress to florentino UE and florentino LE  Pt states she gets muscle spasm episodes 2-3 times a year, each one lasting about 1-3 days  Pt sent to ER from work for further evaluation and concern for seizure like activity  Pt was given valium by EMS en route to the ER  Pt was just evaluated in the ER by myself for similar complaints, please see previous note for further details  She underwent blood work including a CBC, CMP, and Mg as well as a CT head to rule out possible electrolyte disturbance or structural lesion as cause  Pt also had a an xray of her right knee given she fell onto her knee while at work due to unsteadiness  All of which were negative for acute abnormality  Pt was reassessed and noted to be more sleepy and was slurring her words slightly  Her speech recovered quickly as she woke up more, but the patient continued to have muscle spasms and tremors  She attempted to ambulate in the ER with significant difficulty, unsteadiness, and worsening muscle spasms  Pt was unable to ambulate without my assistance  I informed the pt that she would then need to be admitted to the hospital despite her negative workup  She insisted she leave to have a cigarette before admission  I informed the pt that this is not allowed and  a nicotine patch was offered  Pt insisted on leaving  She signed out AMA and said she would return to be admitted after her cigarette       Pt returns to the ER with noticeably decreased tremors and muscle spasms  However, her unsteady gait persists  She states that she continues to feel off balance and has persistent weakness in her LEs  She states she fears she will go home and have a fall  Pt also complains of some lightheadedness when up and walking  She complains of her persistent back pain  She denies any CP, SOB, palpitations, syncopal episodes  Denies any new falls, vision changes, eye pain, headaches, paresthesias, nausea, vomiting, abdominal pain, bowel/bladder incontinence, saddle anesthesia, fevers, chills  Denies any history of seizures  Denies any alcohol and drug use  History provided by:  Patient   used: No        Prior to Admission Medications   Prescriptions Last Dose Informant Patient Reported? Taking? SUMAtriptan (IMITREX) 50 mg tablet More than a month at Unknown time  No No   Sig: Take 1 tablet (50 mg total) by mouth once as needed for migraine   albuterol (2 5 mg/3 mL) 0 083 % nebulizer solution 9/2/2020 at Unknown time  Yes Yes   Sig: Inhale 1 each every 6 (six) hours as needed   albuterol (PROVENTIL HFA,VENTOLIN HFA) 90 mcg/act inhaler 9/2/2020 at Unknown time  No Yes   Sig: INHALE 1 PUFF EVERY 4 (FOUR) HOURS AS NEEDED FOR WHEEZING   budesonide-formoterol (SYMBICORT) 160-4 5 mcg/act inhaler 9/2/2020 at Unknown time  No Yes   Sig: Inhale 2 puffs 2 (two) times a day Rinse mouth after use     cyclobenzaprine (FLEXERIL) 5 mg tablet 9/1/2020 at Unknown time  No Yes   Sig: Take 1 tablet (5 mg total) by mouth 2 (two) times a day   dicyclomine (BENTYL) 20 mg tablet 9/1/2020 at Unknown time  No Yes   Sig: Take 1 tablet (20 mg total) by mouth every 6 (six) hours as needed (stomach cramps)   loratadine (CLARITIN) 10 mg tablet Unknown at Unknown time  No No   Sig: Take 1 tablet (10 mg total) by mouth daily   montelukast (Singulair) 10 mg tablet Unknown at Unknown time  No No   Sig: Take 1 tablet (10 mg total) by mouth daily at bedtime   Patient not taking: Reported on 9/2/2020   nystatin (MYCOSTATIN) 500,000 units/5 mL suspension Past Week at Unknown time  No Yes   Sig: Apply 5 mL (500,000 Units total) to the mouth or throat 4 (four) times a day   omeprazole (PriLOSEC) 40 MG capsule More than a month at Unknown time  No No   Sig: Take 1 capsule (40 mg total) by mouth daily   pregabalin (LYRICA) 75 mg capsule 9/2/2020 at Unknown time  No Yes   Sig: Take 1 capsule (75 mg total) by mouth 2 (two) times a day   prochlorperazine (COMPAZINE) 10 mg tablet More than a month at Unknown time  No No   Sig: Take 1 tablet (10 mg total) by mouth 3 (three) times a day   Patient taking differently: Take 10 mg by mouth every 8 (eight) hours as needed    valACYclovir (VALTREX) 500 mg tablet Past Week at Unknown time  No Yes   Sig: Take 1 tablet (500 mg total) by mouth daily   varenicline (CHANTIX COLLIN) 0 5 MG X 11 & 1 MG X 42 tablet Unknown at Unknown time  No No   Sig: Take one 0 5mg tab by mouth 1x daily for 3 days, then increase to one 0 5mg tab 2x daily for 3 days, then increase to one 1mg tab 2x daily      Facility-Administered Medications: None       Past Medical History:   Diagnosis Date    Anemia     Anxiety     Arthritis     Asthma     Coronary artery disease     Disease of thyroid gland     GERD (gastroesophageal reflux disease)     History of heroin use     herion last used 3 5 years     Hypertension     Hypothyroidism     Infectious viral hepatitis     Mitral valve regurgitation     Myocardial infarction (HCC)     Pneumonia     RA [facility]oid arthritis) ([facility] [address] )     Tobacco abuse     Urinary tract infection        Past Surgical History:   Procedure Laterality Date    APPENDECTOMY      OVARIAN CYST REMOVAL         Family History   Problem Relation Age of Onset    COPD Mother     No Known Problems Father     Asthma Brother     No Known Problems Brother      I have reviewed and agree with the history as documented      E-Cigarette/Vaping    E-Cigarette Use Never User      E-Cigarette/Vaping Substances     Social History     Tobacco Use    Smoking status: Current Every Day Smoker     Packs/day: 1 00     Years: 25 00     Pack years: 25 00     Types: Cigarettes     Start date: 1995    Smokeless tobacco: Never Used   Substance Use Topics    Alcohol use: No    Drug use: Not Currently     Comment: gel for arthrisis       Review of Systems   Constitutional: Negative for appetite change, chills, diaphoresis and fever  HENT: Negative for dental problem, facial swelling, nosebleeds, postnasal drip, rhinorrhea, sinus pressure, sneezing, sore throat and trouble swallowing  Eyes: Negative for photophobia, pain, itching and visual disturbance  Respiratory: Negative for cough, choking, chest tightness, shortness of breath and wheezing  Cardiovascular: Negative for chest pain, palpitations and leg swelling  Gastrointestinal: Negative for abdominal pain, constipation, diarrhea, nausea and vomiting  Musculoskeletal: Positive for arthralgias, back pain, gait problem and myalgias  Negative for joint swelling, neck pain and neck stiffness  Skin: Negative for pallor, rash and wound  Neurological: Positive for dizziness, tremors and light-headedness  Negative for seizures, syncope, facial asymmetry, speech difficulty, weakness, numbness and headaches  Psychiatric/Behavioral: Negative for agitation, behavioral problems, confusion, decreased concentration and dysphoric mood  The patient is not nervous/anxious  Physical Exam  Physical Exam  Constitutional:       General: She is not in acute distress  Appearance: Normal appearance  She is normal weight  She is not ill-appearing, toxic-appearing or diaphoretic  HENT:      Head: Normocephalic and atraumatic  Nose: Nose normal  No congestion or rhinorrhea  Mouth/Throat:      Mouth: Mucous membranes are moist       Pharynx: Oropharynx is clear   No oropharyngeal exudate or posterior oropharyngeal erythema  Eyes:      General:         Right eye: No discharge  Left eye: No discharge  Extraocular Movements: Extraocular movements intact  Conjunctiva/sclera: Conjunctivae normal       Pupils: Pupils are equal, round, and reactive to light  Neck:      Musculoskeletal: Normal range of motion and neck supple  No neck rigidity or muscular tenderness  Comments: Neck soft, full ROM   Non-tender  Cardiovascular:      Rate and Rhythm: Normal rate and regular rhythm  Pulses: Normal pulses  Heart sounds: No murmur  Comments: Regular rate and rhythm   Pulmonary:      Effort: Pulmonary effort is normal  No respiratory distress  Breath sounds: Normal breath sounds  No wheezing, rhonchi or rales  Comments: Breath sounds clear and equal florentino   No wheezes, rhonchi, or rales   Abdominal:      General: Abdomen is flat  There is no distension  Palpations: Abdomen is soft  Tenderness: There is no abdominal tenderness  Comments: Ab soft, NT, ND    Musculoskeletal: Normal range of motion  General: Tenderness and signs of injury present  No swelling or deformity  Right lower leg: No edema  Left lower leg: No edema  Comments: Full ROM noted to all joints   No rigidity   Mild tenderness appreciated over right anterior knee, no obvious deformity    Skin:     General: Skin is warm and dry  Capillary Refill: Capillary refill takes less than 2 seconds  Findings: Bruising present  No lesion or rash  Comments: Ecchymosis noted over right patella    Neurological:      Mental Status: She is alert and oriented to person, place, and time  Mental status is at baseline  Cranial Nerves: No cranial nerve deficit  Sensory: No sensory deficit  Motor: Weakness present        Coordination: Coordination abnormal       Gait: Gait abnormal       Deep Tendon Reflexes: Reflexes normal       Comments: A&O x 3  Appropriately following commands and answering questions   Intermittent florentino UE and LE muscle spasms and tremors noted   Sensation intact florentino   Strength 4/5 florentino UE and florentino LE   CN II-XII intact    Psychiatric:         Mood and Affect: Mood normal          Behavior: Behavior normal          Thought Content:  Thought content normal          Judgment: Judgment normal          Vital Signs  ED Triage Vitals   Temperature Pulse Respirations Blood Pressure SpO2   09/02/20 1659 09/02/20 1659 09/02/20 1659 09/02/20 1659 09/02/20 1659   98 2 °F (36 8 °C) 92 18 155/92 96 %      Temp Source Heart Rate Source Patient Position - Orthostatic VS BP Location FiO2 (%)   09/02/20 1659 -- 09/02/20 1900 09/02/20 1900 --   Temporal  Sitting Right arm       Pain Score       09/02/20 2008       7           Vitals:    09/02/20 1659 09/02/20 1900 09/02/20 2258   BP: 155/92 137/91 126/76   Pulse: 92 82 92   Patient Position - Orthostatic VS:  Sitting Lying         Visual Acuity      ED Medications  Medications   oxyCODONE (ROXICODONE) IR tablet 5 mg (5 mg Oral Given 9/2/20 2226)   morphine injection 2 mg (2 mg Intravenous Given 9/2/20 2008)   budesonide-formoterol (SYMBICORT) 160-4 5 mcg/act inhaler 2 puff (2 puffs Inhalation Given 9/2/20 2005)   tiZANidine (ZANAFLEX) tablet 4 mg (4 mg Oral Given 9/2/20 2010)   loratadine (CLARITIN) tablet 10 mg (has no administration in time range)   montelukast (SINGULAIR) tablet 10 mg (10 mg Oral Refused 9/2/20 2226)   pantoprazole (PROTONIX) EC tablet 40 mg (has no administration in time range)   valACYclovir (VALTREX) tablet 500 mg (has no administration in time range)   pregabalin (LYRICA) capsule 75 mg (75 mg Oral Given 9/2/20 2010)   sodium chloride 0 9 % infusion (84 mL/hr Intravenous New Bag 9/2/20 2020)   nicotine (NICODERM CQ) 14 mg/24hr TD 24 hr patch 1 patch (has no administration in time range)   enoxaparin (LOVENOX) subcutaneous injection 40 mg (40 mg Subcutaneous Given 9/2/20 2005)   acetaminophen (TYLENOL) tablet 650 mg (has no administration in time range)   magnesium hydroxide (MILK OF MAGNESIA) 400 mg/5 mL oral suspension 30 mL (has no administration in time range)   ondansetron (ZOFRAN) injection 4 mg (has no administration in time range)   ketorolac (TORADOL) injection 15 mg (has no administration in time range)   albuterol (PROVENTIL HFA,VENTOLIN HFA) inhaler 2 puff (has no administration in time range)   sodium chloride 0 9 % bolus 1,000 mL (1,000 mL Intravenous New Bag 9/2/20 5338)       Diagnostic Studies  Results Reviewed     None                 MRI inpatient order    (Results Pending)              Procedures  ECG 12 Lead Documentation Only    Date/Time: 9/2/2020 6:17 PM  Performed by: Hayder Herrera PA-C  Authorized by: Hayder Herrera PA-C     Indications / Diagnosis:  Lightheadedness   ECG reviewed by me, the ED Provider: yes    Patient location:  ED  Previous ECG:     Previous ECG:  Compared to current    Similarity:  Changes noted    Comparison to cardiac monitor: Yes    Interpretation:     Interpretation: normal    Rate:     ECG rate:  80    ECG rate assessment: normal    Rhythm:     Rhythm: sinus rhythm    Ectopy:     Ectopy: none    QRS:     QRS axis:  Normal    QRS intervals:  Normal  Conduction:     Conduction: normal    ST segments:     ST segments:  Normal  T waves:     T waves: non-specific and inverted      Inverted:  V2, V3 and V1  Comments:      T wave abnormalities noted on previous EKG done on 9/13/2019              ED Course                                   MDM  Number of Diagnoses or Management Options  Ambulatory dysfunction: new and requires workup  Low back pain: new and requires workup  Muscle spasm: new and requires workup  Tremors of nervous system: new and requires workup  Weakness: new and requires workup  Diagnosis management comments: Pt is a 46 yo female with a PMH significant for chronic low back pain with sciatica, hypothyroidism, HSV, allergies, COPD, tobacco abuse, fibromyalgia, and GERD who returns to the ER with complaints of persistent muscle spasms, tremors, unsteady gait, and now dizziness x 1 day  Complained of muscle spasms and tremors which started in RUE and moves to LUE and florentino LE  Lead to increased gait disturbance and balance issues  Pt was seen just prior to this admission for similar complaints  Workup noted to be normal but pt noted to be sleepier and was unable to ambulate here in the ED without support  Pt advised to be admitted for further workup  Pt ultimately signed out AMA to smoke a cigarette and then returned to the ED for repeat evaluation and admission  Upon return to the ED, pt noted to have improved muscle spasms and tremors but continues to complain of unsteadiness with ambulation and fear of falling and injury  Pt also admits to some lightheadedness  Exam unchanged from previous ED evaluation  Pt noted to have mild resting tremor and muscle spasms in florentino UE and florentino LE  Strength 4/5 florentino UE and florentino LE  Intact sensation florentino  CN intact  A&Ox3  Heart RRR  Lungs CTA florentino  Ab soft, NT, ND  Given new report of lightheadedness, ordered and EKG and started the pt on a 1L fluid bolus  EKG wnl  Given negative workup from her previous ED visit today, no further testing needed for this admission  Pt continued to complain of low back pain and muscle spasm  She was given a dose of toradol 15mg  Pt attempted to ambulate here in the ED  She continued to have some difficulty  While the spasms improved, the pt continued to stumble and complain of unsteadiness, LE weakness, and pain  The case was discussed with Dr Rolando Toney in regard to having the pt admitted to observation for ambulatory dysfunction and further evaluation of her tremors/spasms by neuro and physical therapy evaluation  Dr Rolando Toney agreed to admission to observation on his service  {t agreeable to this plan  Bridging orders were placed          Amount and/or Complexity of Data Reviewed  Tests in the medicine section of CPT®: ordered and reviewed  Review and summarize past medical records: yes  Discuss the patient with other providers: yes    Risk of Complications, Morbidity, and/or Mortality  Presenting problems: moderate  Diagnostic procedures: moderate    Patient Progress  Patient progress: stable        Disposition  Final diagnoses:   Muscle spasm   Tremors of nervous system   Low back pain   Weakness   Ambulatory dysfunction     Time reflects when diagnosis was documented in both MDM as applicable and the Disposition within this note     Time User Action Codes Description Comment    9/2/2020  6:23 PM [place] Lazar Add [N21 705] Muscle spasm     9/2/2020  6:23 PM [place] Lazar Add [R25 1] Tremors of nervous system     9/2/2020  6:23 PM [place] Lazar Add [M54 5] Low back pain     9/2/2020  6:23 PM [place] Lazar Add [R53 1] Weakness     9/2/2020  6:23 PM [place] Lazar Add [R26 2] Ambulatory dysfunction       ED Disposition     ED Disposition Condition Date/Time Comment    Admit Stable Wed Sep 2, 2020  6:22 PM Case was discussed with Dr Krishan Baum and the patient's admission status was agreed to be Admission Status: observation status to the service of Dr Krishan Baum  Follow-up Information    None         Current Discharge Medication List      CONTINUE these medications which have NOT CHANGED    Details   albuterol (2 5 mg/3 mL) 0 083 % nebulizer solution Inhale 1 each every 6 (six) hours as needed      albuterol (PROVENTIL HFA,VENTOLIN HFA) 90 mcg/act inhaler INHALE 1 PUFF EVERY 4 (FOUR) HOURS AS NEEDED FOR WHEEZING  Qty: 8 5 Inhaler, Refills: 4    Associated Diagnoses: Moderate persistent asthma without complication      budesonide-formoterol (SYMBICORT) 160-4 5 mcg/act inhaler Inhale 2 puffs 2 (two) times a day Rinse mouth after use  Qty: 1 Inhaler, Refills: 6    Associated Diagnoses:  Moderate persistent asthma without complication      cyclobenzaprine (FLEXERIL) 5 mg tablet Take 1 tablet (5 mg total) by mouth 2 (two) times a day  Qty: 30 tablet, Refills: 0    Associated Diagnoses: Myalgia      dicyclomine (BENTYL) 20 mg tablet Take 1 tablet (20 mg total) by mouth every 6 (six) hours as needed (stomach cramps)  Qty: 30 tablet, Refills: 1    Associated Diagnoses: Gastritis without bleeding, unspecified chronicity, unspecified gastritis type      nystatin (MYCOSTATIN) 500,000 units/5 mL suspension Apply 5 mL (500,000 Units total) to the mouth or throat 4 (four) times a day  Qty: 473 mL, Refills: 1    Associated Diagnoses: Oral thrush      pregabalin (LYRICA) 75 mg capsule Take 1 capsule (75 mg total) by mouth 2 (two) times a day  Qty: 60 capsule, Refills: 0    Associated Diagnoses: Fibromyalgia      valACYclovir (VALTREX) 500 mg tablet Take 1 tablet (500 mg total) by mouth daily  Qty: 30 tablet, Refills: 0    Associated Diagnoses: Recurrent herpes labialis      loratadine (CLARITIN) 10 mg tablet Take 1 tablet (10 mg total) by mouth daily  Qty: 30 tablet, Refills: 6    Associated Diagnoses: Allergic rhinitis due to other allergic trigger, unspecified seasonality      montelukast (Singulair) 10 mg tablet Take 1 tablet (10 mg total) by mouth daily at bedtime  Qty: 30 tablet, Refills: 5    Associated Diagnoses:  Moderate persistent asthma, unspecified whether complicated      omeprazole (PriLOSEC) 40 MG capsule Take 1 capsule (40 mg total) by mouth daily  Qty: 30 capsule, Refills: 5    Associated Diagnoses: Generalized abdominal pain      prochlorperazine (COMPAZINE) 10 mg tablet Take 1 tablet (10 mg total) by mouth 3 (three) times a day  Qty: 30 tablet, Refills: 0    Associated Diagnoses: Gastritis without bleeding, unspecified chronicity, unspecified gastritis type      SUMAtriptan (IMITREX) 50 mg tablet Take 1 tablet (50 mg total) by mouth once as needed for migraine  Qty: 9 tablet, Refills: 0    Associated Diagnoses: Chronic migraine without aura without status migrainosus, not intractable varenicline (CHANTIX COLLIN) 0 5 MG X 11 & 1 MG X 42 tablet Take one 0 5mg tab by mouth 1x daily for 3 days, then increase to one 0 5mg tab 2x daily for 3 days, then increase to one 1mg tab 2x daily  Qty: 53 tablet, Refills: 0    Associated Diagnoses: Tobacco abuse           No discharge procedures on file      PDMP Review       Value Time User    PDMP Reviewed  Yes 9/2/2020  6:28 PM Haris Ojeda DO          ED Provider  Electronically Signed by           Willis Schmid PA-C  09/02/20 1229 [facility] MELANY Armando  09/02/20 6848

## 2020-09-02 NOTE — ED PROVIDER NOTES
History  Chief Complaint   Patient presents with    Spasms     pt at work started developing muscle spasms whom employer thought were seizure like activity pt was given 4mg valium which helped with spasms  pt has hx of pain an sciatica      Pt is a 48 yo female with a PMH significant for chronic back pain with right sided sciatica, RA, HSV, COPD, asthma, tobacco abuse, allergies, hypothyroidism, fibromyalgia, and GERD who presents to the ER with complaints of diffuse muscle spasms that started 2 hrs prior to arrival in the ER  Pt states her spasms start in her right upper extremity, move to her left upper extremity, and into florentino LE  Pt describes these spasms as repetitive muscle spasms and jerking  Patient states that when these muscle spasms move into her legs she has trouble with maintaining balance and walking  Patient states the symptoms started about 2 hours prior to arrival in the emergency department 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[place] [address]  Pt states she thought the jerking would go away and tried to work through it  However, unfortunately, the pt fell into a patient's closet when trying to provide care and onto her right knee  Pt states she was able to get herself up  denies any head strike and LOC  Admits to some right knee pain since the fall  Pt states that her supervisor at work noticed her stumbling and muscle jerking and advised she go to the ER right away for further evaluation  She states her supervisor told her she was concerned about possible seizure  Pt was taken by ambulance to the ER for further workup and evaluation  Pt was given valium with EMS prior to arrival, which temporarily helped the spasms  Pt states that she gets muscle spasms and tremor episodes about 2-3 times a year  She states they present in this fashion every time and last anywhere from a day to 3 days  Pt states she typically just stays home and lays in bed until the spasms and tremors subside   She states she has never seen a neurologist before in regard to these symptoms  Pt states she did tell her PCP about these symptoms, but states they were not concerned about them  Pt admits to worsening low back pain given the widespread muscle spasms  Pt denies any LOC, tongue biting, paresthesias, vision changes, eye pain, CP, SOB, cough, wheezing, fevers, chills, N/V/D, abdominal pain, bowel/bladder incontinence, saddle anesthesia  Pt denies any previous seizure hx but states her brother has a seizure disorder  Pt denies any alcohol and drug use  History provided by:  Patient   used: No        Prior to Admission Medications   Prescriptions Last Dose Informant Patient Reported? Taking? SUMAtriptan (IMITREX) 50 mg tablet Not Taking at Unknown time  No No   Sig: Take 1 tablet (50 mg total) by mouth once as needed for migraine   albuterol (2 5 mg/3 mL) 0 083 % nebulizer solution Past Month at Unknown time  Yes Yes   Sig: Inhale 1 each every 6 (six) hours as needed   albuterol (PROVENTIL HFA,VENTOLIN HFA) 90 mcg/act inhaler 9/2/2020 at Unknown time  No Yes   Sig: INHALE 1 PUFF EVERY 4 (FOUR) HOURS AS NEEDED FOR WHEEZING   budesonide-formoterol (SYMBICORT) 160-4 5 mcg/act inhaler 9/2/2020 at Unknown time  No Yes   Sig: Inhale 2 puffs 2 (two) times a day Rinse mouth after use     cyclobenzaprine (FLEXERIL) 5 mg tablet 9/1/2020 at Unknown time  No Yes   Sig: Take 1 tablet (5 mg total) by mouth 2 (two) times a day   dicyclomine (BENTYL) 20 mg tablet More than a month at Unknown time  No No   Sig: Take 1 tablet (20 mg total) by mouth every 6 (six) hours as needed (stomach cramps)   loratadine (CLARITIN) 10 mg tablet 9/1/2020 at Unknown time  No Yes   Sig: Take 1 tablet (10 mg total) by mouth daily   montelukast (Singulair) 10 mg tablet 9/1/2020 at Unknown time  No Yes   Sig: Take 1 tablet (10 mg total) by mouth daily at bedtime   Patient not taking: Reported on 9/2/2020   nystatin (MYCOSTATIN) 500,000 units/5 mL suspension Past Month at Unknown time  No Yes   Sig: Apply 5 mL (500,000 Units total) to the mouth or throat 4 (four) times a day   omeprazole (PriLOSEC) 40 MG capsule Not Taking at Unknown time  No No   Sig: Take 1 capsule (40 mg total) by mouth daily   pregabalin (LYRICA) 75 mg capsule 9/2/2020 at Unknown time  No Yes   Sig: Take 1 capsule (75 mg total) by mouth 2 (two) times a day   prochlorperazine (COMPAZINE) 10 mg tablet More than a month at Unknown time  No No   Sig: Take 1 tablet (10 mg total) by mouth 3 (three) times a day   Patient taking differently: Take 10 mg by mouth every 8 (eight) hours as needed    valACYclovir (VALTREX) 500 mg tablet Not Taking at Unknown time  No No   Sig: Take 1 tablet (500 mg total) by mouth daily   varenicline (CHANTIX COLLIN) 0 5 MG X 11 & 1 MG X 42 tablet Past Month at Unknown time  No Yes   Sig: Take one 0 5mg tab by mouth 1x daily for 3 days, then increase to one 0 5mg tab 2x daily for 3 days, then increase to one 1mg tab 2x daily      Facility-Administered Medications: None       Past Medical History:   Diagnosis Date    Anemia     Anxiety     Arthritis     Asthma     Coronary artery disease     Disease of thyroid gland     GERD (gastroesophageal reflux disease)     History of heroin use     herion last used 3 5 years     Hypertension     Hypothyroidism     Infectious viral hepatitis     Mitral valve regurgitation     Myocardial infarction (HCC)     Pneumonia     RA [facility]rheumatoid arthritis) ([facility] [address] )     Tobacco abuse     Urinary tract infection        Past Surgical History:   Procedure Laterality Date    APPENDECTOMY      OVARIAN CYST REMOVAL         Family History   Problem Relation Age of Onset    COPD Mother     No Known Problems Father     Asthma Brother     No Known Problems Brother      I have reviewed and agree with the history as documented      E-Cigarette/Vaping    E-Cigarette Use Never User      E-Cigarette/Vaping Substances     Social History Tobacco Use    Smoking status: Current Every Day Smoker     Packs/day: 1 00     Years: 25 00     Pack years: 25 00     Types: Cigarettes     Start date: 1995    Smokeless tobacco: Never Used   Substance Use Topics    Alcohol use: No    Drug use: Not Currently     Comment: gel for arthrisis       Review of Systems   Constitutional: Negative for appetite change, chills and fever  HENT: Negative for congestion, ear pain, rhinorrhea, sinus pressure, sinus pain, sneezing, sore throat and trouble swallowing  Eyes: Negative for photophobia, pain and visual disturbance  Respiratory: Negative for cough, chest tightness, shortness of breath and wheezing  Cardiovascular: Negative for chest pain and palpitations  Gastrointestinal: Negative for abdominal pain, constipation, diarrhea, nausea and vomiting  Musculoskeletal: Positive for arthralgias, back pain, gait problem and myalgias  Negative for joint swelling, neck pain and neck stiffness  Neurological: Positive for tremors  Negative for dizziness, seizures, syncope, facial asymmetry, speech difficulty, weakness, light-headedness, numbness and headaches  Psychiatric/Behavioral: Negative for agitation, behavioral problems, confusion and decreased concentration  The patient is not nervous/anxious  Physical Exam  Physical Exam  Constitutional:       General: She is not in acute distress  Appearance: Normal appearance  She is normal weight  She is not ill-appearing, toxic-appearing or diaphoretic  Comments: Young female sitting on the stretcher   non-toxic appearing   Demonstrating intermittent muscle spasms and jerking of florentino UE, trunk and florentino LE    HENT:      Head: Normocephalic and atraumatic  Nose: Nose normal  No congestion or rhinorrhea  Mouth/Throat:      Mouth: Mucous membranes are moist       Pharynx: Oropharynx is clear  Eyes:      General: No scleral icterus  Right eye: No discharge           Left eye: No discharge  Extraocular Movements: Extraocular movements intact  Conjunctiva/sclera: Conjunctivae normal       Pupils: Pupils are equal, round, and reactive to light  Neck:      Musculoskeletal: Normal range of motion and neck supple  No neck rigidity or muscular tenderness  Comments: Full ROM noted to be c-spine   Cardiovascular:      Rate and Rhythm: Normal rate and regular rhythm  Pulses: Normal pulses  Heart sounds: No murmur  Comments: Regular rate and rhythm   Pulmonary:      Effort: Pulmonary effort is normal  No respiratory distress  Breath sounds: Normal breath sounds  No wheezing, rhonchi or rales  Comments: Breath sounds clear and equal florentino   Abdominal:      General: Abdomen is flat  There is no distension  Palpations: Abdomen is soft  Tenderness: There is no abdominal tenderness  There is no guarding or rebound  Musculoskeletal: Normal range of motion  General: Tenderness and signs of injury present  No swelling or deformity  Right lower leg: No edema  Left lower leg: No edema  Comments: Tenderness noted to right anterior knee over patella   No obvious deformity or edema  Full ROM noted to R knee    Skin:     General: Skin is warm and dry  Capillary Refill: Capillary refill takes less than 2 seconds  Findings: Bruising present  Comments: Small area of ecchymosis noted to right knee   No open lesions or bleeding    Neurological:      Mental Status: She is alert and oriented to person, place, and time  Mental status is at baseline  Cranial Nerves: No cranial nerve deficit  Sensory: No sensory deficit  Motor: Weakness present        Coordination: Coordination abnormal       Gait: Gait abnormal       Deep Tendon Reflexes: Reflexes normal       Comments: A&Ox3  Appropriately answering questions and following commands   Resting tremor and muscle spasms noted to florentino UE and florentino LE, worsening with intended movements  Strength noted to be 4/5 in florentino LE and florentino LE   No rigidity noted to all four extremities  Sensation intact florentino   No appreciated CN deficit    Psychiatric:         Mood and Affect: Mood normal          Behavior: Behavior normal          Thought Content: Thought content normal          Judgment: Judgment normal          Vital Signs  ED Triage Vitals   Temperature Pulse Respirations Blood Pressure SpO2   09/02/20 1242 09/02/20 1242 09/02/20 1242 09/02/20 1243 09/02/20 1242   (!) 97 °F (36 1 °C) 85 17 130/82 97 %      Temp Source Heart Rate Source Patient Position - Orthostatic VS BP Location FiO2 (%)   09/02/20 1242 09/02/20 1242 09/02/20 1242 09/02/20 1242 --   Temporal Monitor Lying Right arm       Pain Score       09/02/20 1251       8           Vitals:    09/02/20 1242 09/02/20 1243   BP:  130/82   Pulse: 85    Patient Position - Orthostatic VS: Lying          Visual Acuity      ED Medications  Medications   diazepam (FOR EMS ONLY)(VALIUM) 5 mg/mL injection 1 each (0 mg Does not apply Given to EMS 9/2/20 1249)   acetaminophen (TYLENOL) tablet 650 mg (650 mg Oral Given 9/2/20 1410)   methocarbamol (ROBAXIN) tablet 500 mg (500 mg Oral Given 9/2/20 1410)   ketorolac (TORADOL) injection 15 mg (15 mg Intravenous Given 9/2/20 1545)       Diagnostic Studies  Results Reviewed     Procedure Component Value Units Date/Time    TSH [[id number]]  (Normal) Collected:  09/02/20 1407    Lab Status:  Final result Specimen:  Blood from Arm, Right Updated:  09/02/20 1459     TSH 3RD GENERATON 1 664 uIU/mL     Narrative:       Patients undergoing fluorescein dye angiography may retain small amounts of fluorescein in the body for 48-72 hours post procedure  Samples containing fluorescein can produce falsely depressed TSH values  If the patient had this procedure,a specimen should be resubmitted post fluorescein clearance        Magnesium [[id number]]  (Normal) Collected:  09/02/20 1407    Lab Status:  Final result Specimen: Blood from Arm, Right Updated:  09/02/20 1459     Magnesium 1 9 mg/dL     Comprehensive metabolic panel [[id number]]  (Abnormal) Collected:  09/02/20 1407    Lab Status:  Final result Specimen:  Blood from Arm, Right Updated:  09/02/20 1452     Sodium 140 mmol/L      Potassium 3 8 mmol/L      Chloride 107 mmol/L      CO2 29 mmol/L      ANION GAP 4 mmol/L      BUN 17 mg/dL      Creatinine 0 82 mg/dL      Glucose 75 mg/dL      Calcium 8 4 mg/dL      AST 45 U/L      ALT 72 U/L      Alkaline Phosphatase 24 U/L      Total Protein 6 8 g/dL      Albumin 3 5 g/dL      Total Bilirubin 0 52 mg/dL      eGFR 91 ml/min/1 73sq m     Narrative:       [place] guidelines for Chronic Kidney Disease (CKD):     Stage 1 with normal or high GFR (GFR > 90 mL/min/1 73 square meters)    Stage 2 Mild CKD (GFR = 60-89 mL/min/1 73 square meters)    Stage 3A Moderate CKD (GFR = 45-59 mL/min/1 73 square meters)    Stage 3B Moderate CKD (GFR = 30-44 mL/min/1 73 square meters)    Stage 4 Severe CKD (GFR = 15-29 mL/min/1 73 square meters)    Stage 5 End Stage CKD (GFR <15 mL/min/1 73 square meters)  Note: GFR calculation is accurate only with a steady state creatinine    CBC and differential [[id number]] Collected:  09/02/20 1407    Lab Status:  Final result Specimen:  Blood from Arm, Right Updated:  09/02/20 1420     WBC 6 82 Thousand/uL      RBC 4 35 Million/uL      Hemoglobin 13 4 g/dL      Hematocrit 41 0 %      MCV 94 fL      MCH 30 8 pg      MCHC 32 7 g/dL      RDW 12 5 %      MPV 10 2 fL      Platelets 514 Thousands/uL      nRBC 0 /100 WBCs      Neutrophils Relative 61 %      Immat GRANS % 0 %      Lymphocytes Relative 30 %      Monocytes Relative 6 %      Eosinophils Relative 2 %      Basophils Relative 1 %      Neutrophils Absolute 4 16 Thousands/µL      Immature Grans Absolute 0 03 Thousand/uL      Lymphocytes Absolute 2 04 Thousands/µL      Monocytes Absolute 0 39 Thousand/µL      Eosinophils Absolute 0 15 Thousand/µL      Basophils Absolute 0 05 Thousands/µL                  CT head without contrast   Final Result by Rochelle Dutton MD (09/02 1524)      No acute intracranial abnormality  Workstation performed: [id number]         XR knee 4+ views Right injury   Final Result by Niraj Lewis MD (09/02 1436)      No acute osseous abnormality  Workstation performed: [id number]                    Procedures  Procedures         ED Course  ED Course as of Sep 03 2023   Wed Sep 02, 2020   1541 Pt reassessed and her tremors were noted to be decreased with only occasional tremor noted to UE  Pt complaining of persistent back pain despite the lidocaine patch, robaxin, and tylenol  Pt given a dose of Toradol  1623 Pt reassessed again after toradol and appeared much sleepier and was temporarily slurring her speech  She continued to demonstrate tremors  She woke up more and her speech improved  She attempted to ambulate in the department  She was significantly unstable when walking, I needed to hold her up  She complained of muscle spasms in her florentino LE  Also complained of some weakness in her legs  Pt was informed that she will now require admission given her ambulatory dysfunction  Pt was upset and stated she wanted to leave  1626 I discussed with the pt the risks associated with leaving and she ultimately agreed for admission but stated she needed to go outside first to smoke a cigarette  I informed the pt that this is a smoke free campus and she is unable to do this  She would have to sign out AMA and re-check in  Pt stated she would sign out AMA  I again discussed the risks and pt still wanted to leave  Pt remained A&O x3 and of sound mind to make her own medical decisions  Pt proceeded to sign out AMA          US AUDIT      Most Recent Value   Initial Alcohol Screen: US AUDIT-C    1  How often do you have a drink containing alcohol?  0 Filed at: 09/02/2020 1249   2   How many drinks containing alcohol do you have on a typical day you are drinking? 0 Filed at: 09/02/2020 1249   3a  Male UNDER 65: How often do you have five or more drinks on one occasion? 0 Filed at: 09/02/2020 1249   3b  FEMALE Any Age, or MALE 65+: How often do you have 4 or more drinks on one occassion? 0 Filed at: 09/02/2020 1249   Audit-C Score  0 Filed at: 09/02/2020 1249                  TERRY/DAST-10      Most Recent Value   How many times in the past year have you    Used an illegal drug or used a prescription medication for non-medical reasons? Never Filed at: 09/02/2020 1249                                MDM  Number of Diagnoses or Management Options  Low back pain: new and does not require workup  Muscle spasm: new and requires workup  Tremors of nervous system: new and requires workup  Diagnosis management comments: Pt is a 46 yo female with a PMH significant for chronic back pain with right sided sciatica, RA, HSV, COPD, asthma, tobacco abuse, allergies, hypothyroidism, fibromyalgia, and GERD who presents to the ER with complaints of diffuse muscle spasms that started 2 hrs prior to arrival in the ER  Reported spasms start in RUE and progressed to LUE and florentino LE, lead to increased unsteadiness and difficulty walking  Pt reported a fall while at work onto R knee, no head strike or LOC  Pt stated she gets these tremors and muscle spasm episodes 2-3 times a year and they last about 1-3 days  Pt denied ever having a workup, imaging,or ever having seen a neurologist in regard to these symptoms  PCP reportedly unconcerned per pt  admits to right knee pain  Admits to low back pain given increased muscle spasms  Pt sent from work via ambulance for further evaluation, given valium by EMS enroute  See physical exam as noted above in note       Given presenting sx, history, and lack of previous imaging/workup, will order labs consisting of a CBC, CMP, and Mg to rule out electrolyte disturbance and a CT head to rule out structural lesion  Pt given a dose of robaxin, tylenol, and lidocaine patch for her back pain  CT head without acute abnormality  No significant abnormality noted on blood work  Pt reassessed and noted to be sleeping  Upon awakening, pt noted to be groggy  She was informed of her negative workup here in the ER  Pt continue to complain of back pain  Pt given a dose of toradol 15mg  Pt reassessed and still appeared sleepy and slightly slurring her words  As she sat up, her speech recovered  Pt attempted to ambulate in the department with significant unsteadiness  Her spasms and tremors worsening with ambulation  Unable to ambulate independently without significant risk of falling  Pt was informed that she would require admission  She intitially refused admission saying her workup was negative  I spoke to the pt and advised her to stay and be seen by neurology  Pt agreed to stay but stated she would need to go outside and smoke a cigarette beforehand  I informed the pt that this was not allowed and that if she left the building she would have to be discharged AMA and re-check in to be seen again  PT stated she still wanted to leave  I explained the pt the risks associated with leaving against medical advise, including permanent neurologic deficits, permanent disability, permanent co-morbidities, significant injury from falls, and even death  Pt still insisted she leave for a cigarette  AMA paperwork was prepared and the pt signed out against medical advise, stating she would be back after her cigarette  Pt was given the contact information for [facility]'s neurology office in [facility] and strongly encouraged to follow-up should she note return  She was agreeable to this  Pt also given the contact information for the  [facility] program and encouraged to follow up in regard to her back pain  Pt again informed of the risks of leaving AMA and she again stated she fully understood   Pt signed the AMA form and left the ER  Amount and/or Complexity of Data Reviewed  Clinical lab tests: ordered and reviewed  Tests in the radiology section of CPT®: ordered and reviewed  Tests in the medicine section of CPT®: ordered and reviewed  Review and summarize past medical records: yes  Discuss the patient with other providers: yes  Independent visualization of images, tracings, or specimens: yes    Risk of Complications, Morbidity, and/or Mortality  Presenting problems: moderate  Diagnostic procedures: moderate  Management options: moderate    Patient Progress  Patient progress: stable        Disposition  Final diagnoses:   Muscle spasm   Tremors of nervous system   Low back pain     Time reflects when diagnosis was documented in both MDM as applicable and the Disposition within this note     Time User Action Codes Description Comment    9/2/2020  3:29 PM Ilean Goldmann Add [F46 667] Muscle spasm     9/2/2020  3:30 PM Ilean Goldmann Add [M54 5] Low back pain     9/2/2020  3:32 PM Ilean Goldmann Remove [M54 5] Low back pain     9/2/2020  3:32 PM Ilean Goldmann Add [R25 1] Tremors of nervous system     9/2/2020  3:32 PM Ilean Goldmann Add [M54 5] Low back pain       ED Disposition     ED Disposition Condition Date/Time Comment    AMA  Wed Sep 2, 2020  4:18 PM Date: 9/2/2020  Patient: Marc Huynh  Admitted: 9/2/2020 12:42 PM  Attending Provider: Carolyn Shen DO    Marc Huynh or her authorized caregiver has made the decision for the patient to leave the emergency department against the advice of  Hector Benites PA-C  She or her authorized caregiver has been informed and understands the inherent risks, including death, permanent disability, permanent paralysis, serious injury from falls, permanent neurologic disability, and multiple co-morbi dities  She or her authorized caregiver has decided to accept the responsibility for this decision   Marc Huynh and all necessary parties have been advised that she may return for further evaluation or treatment  Her condition at time of discha rge was stable    Andree Rizo had current vital signs as follows:  /82   Pulse 85   Temp (!) 97 °F (36 1 °C) (Temporal)   Resp 17   LMP  (LMP Unknown)         Follow-up Information     Follow up With Specialties Details Why Contact Info Additional Information    Felicia Alejandra, [address], Nurse Practitioner Schedule an appointment as soon as possible for a visit in 1 week  [address]        [address] [facility] Program Physical Therapy Schedule an appointment as soon as possible for a visit in 1 week  [address] [facility] Neurology [facility] Neurology Schedule an appointment as soon as possible for a visit in 1 week  [address] [facility] [address] [facility] [id number]  [address] [facility] Neurology [facility], [address] [place] [address], [address]          Discharge Medication List as of 9/2/2020  4:19 PM      CONTINUE these medications which have NOT CHANGED    Details   albuterol (2 5 mg/3 mL) 0 083 % nebulizer solution Inhale 1 each every 6 (six) hours as needed, Starting Wed 9/17/2014, Historical Med      albuterol (PROVENTIL HFA,VENTOLIN HFA) 90 mcg/act inhaler INHALE 1 PUFF EVERY 4 (FOUR) HOURS AS NEEDED FOR WHEEZING, Starting Thu 7/23/2020, Normal      budesonide-formoterol (SYMBICORT) 160-4 5 mcg/act inhaler Inhale 2 puffs 2 (two) times a day Rinse mouth after use , Starting Fri 5/29/2020, Normal      cyclobenzaprine (FLEXERIL) 5 mg tablet Take 1 tablet (5 mg total) by mouth 2 (two) times a day, Starting Wed 8/19/2020, Normal      dicyclomine (BENTYL) 20 mg tablet Take 1 tablet (20 mg total) by mouth every 6 (six) hours as needed (stomach cramps), Starting Wed 11/27/2019, Normal      nystatin (MYCOSTATIN) 500,000 units/5 mL suspension Apply 5 mL (500,000 Units total) to the mouth or throat 4 (four) times a day, Starting Mon 7/27/2020, Normal      pregabalin (LYRICA) 75 mg capsule Take 1 capsule (75 mg total) by mouth 2 (two) times a day, Starting Tue 9/1/2020, Normal      valACYclovir (VALTREX) 500 mg tablet Take 1 tablet (500 mg total) by mouth daily, Starting Thu 7/16/2020, Until Fri 11/13/2020, Normal      acyclovir (Zovirax) 5 % ointment Apply topically every 4 (four) hours, Starting Thu 7/16/2020, Normal      EPINEPHrine (EPIPEN) 0 3 mg/0 3 mL SOAJ Inject 0 3 mL (0 3 mg total) into a muscle once for 1 dose, Starting Wed 11/27/2019, Print      ibuprofen (MOTRIN) 600 mg tablet Take by mouth, Starting Thu 6/11/2015, Historical Med      ondansetron (ZOFRAN-ODT) 4 mg disintegrating tablet Take by mouth, Starting Sun 6/26/2016, Historical Med      loratadine (CLARITIN) 10 mg tablet Take 1 tablet (10 mg total) by mouth daily, Starting Fri 5/29/2020, Normal      montelukast (Singulair) 10 mg tablet Take 1 tablet (10 mg total) by mouth daily at bedtime, Starting Fri 5/8/2020, Normal      omeprazole (PriLOSEC) 40 MG capsule Take 1 capsule (40 mg total) by mouth daily, Starting Fri 5/8/2020, Normal      prochlorperazine (COMPAZINE) 10 mg tablet Take 1 tablet (10 mg total) by mouth 3 (three) times a day, Starting Wed 11/27/2019, Normal      SUMAtriptan (IMITREX) 50 mg tablet Take 1 tablet (50 mg total) by mouth once as needed for migraine, Starting Fri 5/8/2020, Normal      varenicline (CHANTIX COLLIN) 0 5 MG X 11 & 1 MG X 42 tablet Take one 0 5mg tab by mouth 1x daily for 3 days, then increase to one 0 5mg tab 2x daily for 3 days, then increase to one 1mg tab 2x daily, Normal      diclofenac sodium (VOLTAREN) 1 % Apply 2 g topically 4 (four) times a day, Starting Sun 12/29/2019, Print      lidocaine (LIDODERM) 5 % Apply 1 patch topically daily Remove & Discard patch within 12 hours or as directed by MD, Starting Sun 12/29/2019, Print      naproxen sodium (ANAPROX) 550 mg tablet Take by mouth, Starting Wed 3/16/2016, Historical Med           No discharge procedures on file      PDMP Review       Value Time User    PDMP Reviewed  Yes 9/2/2020  6:28 PM Blanca Shaw DO          ED Provider  Electronically Signed by           Gabbi Angel PA-C  09/03/20 2023

## 2020-09-02 NOTE — H&P
H&P- Hayde John 1980, 47 y o  female MRN: [id number]  Unit/Bed#: ED 18 Encounter: [id number]  Primary Care Provider: MARSHA Garnett   Date and time admitted to hospital: 9/2/2020  5:15 PM        Assessment and Plan  * Tremors of nervous system  Assessment & Plan  · Tremors of nervous system  Patient reports episodes happen 3-4 times yearly  Usually spontaneously resolves but this time happened at work  · Will check MRI of brain and have neurology evaluate  Patient denies any increased anxiety  No evidence of electrolyte disturbances  TSH within limits  · Was given diazepam on route and attempt to walk patient in ED unsuccessful with ambulatory dysfunction  · Have PT evaluate    Rheumatoid arthritis ([facility] [address] )  Assessment & Plan  · Rheumatoid arthritis scheduled to see Rheumatology later this month  · Does have chronic pain on pregabalin  Reviewed on   · Given whole body pain with spasms will check CK in a m  Hepatitis C  Assessment & Plan  · History of hepatitis-C    Dyspepsia  Assessment & Plan  · GERD continue PPI    Moderate persistent asthma without complication  Assessment & Plan  · Asthma without exacerbation  Continue symbicort singulair and loratadine    Hypothyroidism  Assessment & Plan  · History of hypothyroidism but stop taking medications  TSH within limits      VTE Prophylaxis: Enoxaparin (Lovenox)  Code Status: Level 1 - Full Code  Anticipated Length of Stay:  Patient will be admitted on an Observation basis with an anticipated length of stay of  less than 2 midnights  Justification for Hospital Stay: Tremors of nervous system  Total Time for Visit, including Counseling / Coordination of Care: x mins  Greater than 50% of this total time spent on direct patient counseling and coordination of care  Chief Complaint:     Chief Complaint   Patient presents with    Weakness - Generalized     pt reports just left AMA to smoke a cigarette and is back to be admitted  reports weakness and tremors, soreness, and unsteadiness      History of Present Illness:    Juanita Pressley is a 47 y o  female who presents with whole body tremors  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  She had electric shocks throughout her whole body lasting for several minutes causing her to have involuntary movements of limbs and spasms  Staff there was concerned of seizure activity and the patient was brought here where after given diazepam enroute she was feeling unsteady and not stable for discharge  It was later noted the patient did go outside left AMA to smoke a cigarette and came back  The patient states that these episodes happen approximately 4 times a year  She has seen different providers in the past but not a neurologist     Review of Systems:  Review of Systems   Constitutional: Negative for chills, diaphoresis and fever  HENT: Negative for dental problem and facial swelling  Eyes: Negative for photophobia, pain and visual disturbance  Respiratory: Negative for shortness of breath, wheezing and stridor  Cardiovascular: Negative for chest pain and palpitations  Gastrointestinal: Negative for abdominal distention, abdominal pain, diarrhea, nausea and vomiting  Genitourinary: Negative for dysuria, hematuria and urgency  Musculoskeletal: Positive for arthralgias, back pain, gait problem and myalgias  Skin: Negative for rash  Neurological: Positive for tremors and seizures  Negative for dizziness, speech difficulty, numbness and headaches  Psychiatric/Behavioral: Negative for agitation and suicidal ideas  The patient is not nervous/anxious  All other systems reviewed and are negative        Past Medical and Surgical History:   Past Medical History:   Diagnosis Date    Anemia     Anxiety     Arthritis     Asthma     Coronary artery disease     Disease of thyroid gland     GERD (gastroesophageal reflux disease)     History of heroin use     herion last used 3 5 years     Hypertension     Hypothyroidism     Infectious viral hepatitis     Mitral valve regurgitation     Myocardial infarction (HCC)     Pneumonia     RA (rheumatoid arthritis) ([address] )     Tobacco abuse     Urinary tract infection      Past Surgical History:   Procedure Laterality Date    APPENDECTOMY      OVARIAN CYST REMOVAL       Meds/Allergies: Allergies: Allergies   Allergen Reactions    Ativan [Lorazepam]     Coconut Oil      coconut    Nuts     Penicillins     Shellfish-Derived Products     Tegretol [Carbamazepine]      Prior to Admission Medications   Prescriptions Last Dose Informant Patient Reported? Taking? SUMAtriptan (IMITREX) 50 mg tablet   No Yes   Sig: Take 1 tablet (50 mg total) by mouth once as needed for migraine   albuterol (2 5 mg/3 mL) 0 083 % nebulizer solution   Yes Yes   Sig: Inhale 1 each every 6 (six) hours as needed   albuterol (PROVENTIL HFA,VENTOLIN HFA) 90 mcg/act inhaler   No Yes   Sig: INHALE 1 PUFF EVERY 4 (FOUR) HOURS AS NEEDED FOR WHEEZING   budesonide-formoterol (SYMBICORT) 160-4 5 mcg/act inhaler   No Yes   Sig: Inhale 2 puffs 2 (two) times a day Rinse mouth after use     cyclobenzaprine (FLEXERIL) 5 mg tablet   No Yes   Sig: Take 1 tablet (5 mg total) by mouth 2 (two) times a day   dicyclomine (BENTYL) 20 mg tablet   No Yes   Sig: Take 1 tablet (20 mg total) by mouth every 6 (six) hours as needed (stomach cramps)   loratadine (CLARITIN) 10 mg tablet   No Yes   Sig: Take 1 tablet (10 mg total) by mouth daily   montelukast (Singulair) 10 mg tablet   No Yes   Sig: Take 1 tablet (10 mg total) by mouth daily at bedtime   nystatin (MYCOSTATIN) 500,000 units/5 mL suspension   No Yes   Sig: Apply 5 mL (500,000 Units total) to the mouth or throat 4 (four) times a day   omeprazole (PriLOSEC) 40 MG capsule   No Yes   Sig: Take 1 capsule (40 mg total) by mouth daily   pregabalin (LYRICA) 75 mg capsule   No Yes   Sig: Take 1 capsule (75 mg total) by mouth 2 (two) times a day   prochlorperazine (COMPAZINE) 10 mg tablet   No Yes   Sig: Take 1 tablet (10 mg total) by mouth 3 (three) times a day   Patient taking differently: Take 10 mg by mouth every 8 (eight) hours as needed    valACYclovir (VALTREX) 500 mg tablet   No Yes   Sig: Take 1 tablet (500 mg total) by mouth daily   varenicline (CHANTIX COLLIN) 0 5 MG X 11 & 1 MG X 42 tablet   No Yes   Sig: Take one 0 5mg tab by mouth 1x daily for 3 days, then increase to one 0 5mg tab 2x daily for 3 days, then increase to one 1mg tab 2x daily      Facility-Administered Medications: None     Social History:     Social History     Socioeconomic History    Marital status: /Civil Union     Spouse name: Not on file    Number of children: Not on file    Years of education: Not on file    Highest education level: Not on file   Occupational History    Not on file   Social Needs    Financial resource strain: Not on file    Food insecurity     Worry: Not on file     Inability: Not on file   Singular needs     Medical: Not on file     Non-medical: Not on file   Tobacco Use    Smoking status: Current Every Day Smoker     Packs/day: 0 50     Years: 25 00     Pack years: 12 50     Types: Cigarettes     Start date: 1995    Smokeless tobacco: Never Used   Substance and Sexual Activity    Alcohol use: No    Drug use: Not Currently     Comment: gel for arthrisis    Sexual activity: Yes   Lifestyle    Physical activity     Days per week: Not on file     Minutes per session: Not on file    Stress: Not on file   Relationships    Social connections     Talks on phone: Not on file     Gets together: Not on file     Attends Yazidism service: Not on file     Active member of club or organization: Not on file     Attends meetings of clubs or organizations: Not on file     Relationship status: Not on file    Intimate partner violence     Fear of current or ex partner: Not on file     Emotionally abused: Not on file     Physically abused: Not on file     Forced sexual activity: Not on file   Other Topics Concern    Not on file   Social History Narrative    Not on file     Patient Pre-hospital Living Situation:   Patient Pre-hospital Level of Mobility:   Patient Pre-hospital Diet Restrictions:     Family History:  Family History   Problem Relation Age of Onset    COPD Mother     No Known Problems Father     Asthma Brother     No Known Problems Brother      Physical Exam:   Vitals:   Blood Pressure: 137/91 (09/02/20 1900)  Pulse: 82 (09/02/20 1900)  Temperature: 98 3 °F (36 8 °C) (09/02/20 1900)  Temp Source: Temporal (09/02/20 1900)  Respirations: 18 (09/02/20 1900)  Weight - Scale: 57 kg (125 lb 10 6 oz) (09/02/20 1658)  SpO2: 97 % (09/02/20 1900)    Physical Exam  Vitals signs reviewed  Constitutional:       General: She is not in acute distress  Appearance: Normal appearance  HENT:      Head: Atraumatic  Eyes:      General: No scleral icterus  Extraocular Movements: Extraocular movements intact  Cardiovascular:      Rate and Rhythm: Regular rhythm  Heart sounds: Normal heart sounds  Pulmonary:      Breath sounds: No wheezing  Comments: diminished breath sounds bibasilar  Abdominal:      General: Bowel sounds are normal       Palpations: Abdomen is soft  Tenderness: There is no guarding or rebound  Musculoskeletal:         General: Tenderness (Tenderness all extremities) present  No swelling  Skin:     General: Skin is warm  Neurological:      Mental Status: She is alert and oriented to person, place, and time  Mental status is at baseline  Psychiatric:         Mood and Affect: Mood normal        Lab Results: I have personally reviewed pertinent reports      Results from last 7 days   Lab Units 09/02/20  1407   WBC Thousand/uL 6 82   HEMOGLOBIN g/dL 13 4   HEMATOCRIT % 41 0   PLATELETS Thousands/uL 190   NEUTROS PCT % 61   LYMPHS PCT % 30   MONOS PCT % 6   EOS PCT % 2     Results from last 7 days   Lab Units 09/02/20  1407   SODIUM mmol/L 140   POTASSIUM mmol/L 3 8   CHLORIDE mmol/L 107   CO2 mmol/L 29   ANION GAP mmol/L 4   BUN mg/dL 17   CREATININE mg/dL 0 82   CALCIUM mg/dL 8 4   ALBUMIN g/dL 3 5   TOTAL BILIRUBIN mg/dL 0 52   ALK PHOS U/L 24*   ALT U/L 72   AST U/L 45   EGFR ml/min/1 73sq m 91   GLUCOSE RANDOM mg/dL 75                                     Imaging: I have personally reviewed pertinent films in PACS  Xr Knee 4+ Views Right Injury    Result Date: 9/2/2020  Impression: No acute osseous abnormality  Workstation performed: [id number]     Ct Head Without Contrast    Result Date: 9/2/2020  Impression: No acute intracranial abnormality  Workstation performed: [id number]       EKG, Pathology, and Other Studies Reviewed on Admission:   EKG  Result Date: 09/02/20  Personally reviewed strips with impression of:  Normal sinus rhythm 80 bpm    Allscripts/ Epic Records Reviewed: Yes    ** Please Note: This note has been constructed using a voice recognition system   **

## 2020-09-03 ENCOUNTER — APPOINTMENT (OUTPATIENT)
Dept: MRI IMAGING | Facility: HOSPITAL | Age: 40
End: 2020-09-03
Payer: COMMERCIAL

## 2020-09-03 PROBLEM — Z72.0 TOBACCO ABUSE: Status: ACTIVE | Noted: 2020-09-03

## 2020-09-03 LAB
ALBUMIN SERPL BCP-MCNC: 3.1 G/DL (ref 3.5–5)
ALP SERPL-CCNC: 31 U/L (ref 46–116)
ALT SERPL W P-5'-P-CCNC: 62 U/L (ref 12–78)
AMMONIA PLAS-SCNC: 33 UMOL/L (ref 11–35)
ANION GAP SERPL CALCULATED.3IONS-SCNC: 9 MMOL/L (ref 4–13)
AST SERPL W P-5'-P-CCNC: 32 U/L (ref 5–45)
BILIRUB SERPL-MCNC: 0.25 MG/DL (ref 0.2–1)
BUN SERPL-MCNC: 19 MG/DL (ref 5–25)
CALCIUM SERPL-MCNC: 7.8 MG/DL (ref 8.3–10.1)
CHLORIDE SERPL-SCNC: 108 MMOL/L (ref 100–108)
CK SERPL-CCNC: 83 U/L (ref 26–192)
CO2 SERPL-SCNC: 24 MMOL/L (ref 21–32)
CREAT SERPL-MCNC: 0.82 MG/DL (ref 0.6–1.3)
ERYTHROCYTE [DISTWIDTH] IN BLOOD BY AUTOMATED COUNT: 12.8 % (ref 11.6–15.1)
FOLATE SERPL-MCNC: 7.8 NG/ML (ref 3.1–17.5)
GFR SERPL CREATININE-BSD FRML MDRD: 91 ML/MIN/1.73SQ M
GLUCOSE P FAST SERPL-MCNC: 119 MG/DL (ref 65–99)
GLUCOSE SERPL-MCNC: 119 MG/DL (ref 65–140)
HCT VFR BLD AUTO: 38.2 % (ref 34.8–46.1)
HGB BLD-MCNC: 12.3 G/DL (ref 11.5–15.4)
MCH RBC QN AUTO: 30.9 PG (ref 26.8–34.3)
MCHC RBC AUTO-ENTMCNC: 32.2 G/DL (ref 31.4–37.4)
MCV RBC AUTO: 96 FL (ref 82–98)
PLATELET # BLD AUTO: 176 THOUSANDS/UL (ref 149–390)
PMV BLD AUTO: 10.4 FL (ref 8.9–12.7)
POTASSIUM SERPL-SCNC: 3.9 MMOL/L (ref 3.5–5.3)
PROT SERPL-MCNC: 6.2 G/DL (ref 6.4–8.2)
RBC # BLD AUTO: 3.98 MILLION/UL (ref 3.81–5.12)
SODIUM SERPL-SCNC: 141 MMOL/L (ref 136–145)
VIT B12 SERPL-MCNC: 410 PG/ML (ref 100–900)
WBC # BLD AUTO: 5.44 THOUSAND/UL (ref 4.31–10.16)

## 2020-09-03 PROCEDURE — A9585 GADOBUTROL INJECTION: HCPCS | Performed by: PHYSICIAN ASSISTANT

## 2020-09-03 PROCEDURE — 85613 RUSSELL VIPER VENOM DILUTED: CPT | Performed by: PHYSICIAN ASSISTANT

## 2020-09-03 PROCEDURE — 82300 ASSAY OF CADMIUM: CPT | Performed by: PHYSICIAN ASSISTANT

## 2020-09-03 PROCEDURE — 83825 ASSAY OF MERCURY: CPT | Performed by: PHYSICIAN ASSISTANT

## 2020-09-03 PROCEDURE — 85732 THROMBOPLASTIN TIME PARTIAL: CPT | Performed by: PHYSICIAN ASSISTANT

## 2020-09-03 PROCEDURE — 80053 COMPREHEN METABOLIC PANEL: CPT | Performed by: INTERNAL MEDICINE

## 2020-09-03 PROCEDURE — 72156 MRI NECK SPINE W/O & W/DYE: CPT

## 2020-09-03 PROCEDURE — G1004 CDSM NDSC: HCPCS

## 2020-09-03 PROCEDURE — 99225 PR SBSQ OBSERVATION CARE/DAY 25 MINUTES: CPT | Performed by: PHYSICIAN ASSISTANT

## 2020-09-03 PROCEDURE — 82140 ASSAY OF AMMONIA: CPT | Performed by: PHYSICIAN ASSISTANT

## 2020-09-03 PROCEDURE — 82175 ASSAY OF ARSENIC: CPT | Performed by: PHYSICIAN ASSISTANT

## 2020-09-03 PROCEDURE — 99245 OFF/OP CONSLTJ NEW/EST HI 55: CPT | Performed by: PSYCHIATRY & NEUROLOGY

## 2020-09-03 PROCEDURE — 70553 MRI BRAIN STEM W/O & W/DYE: CPT

## 2020-09-03 PROCEDURE — 83655 ASSAY OF LEAD: CPT | Performed by: PHYSICIAN ASSISTANT

## 2020-09-03 PROCEDURE — 82550 ASSAY OF CK (CPK): CPT | Performed by: INTERNAL MEDICINE

## 2020-09-03 PROCEDURE — 85670 THROMBIN TIME PLASMA: CPT | Performed by: PHYSICIAN ASSISTANT

## 2020-09-03 PROCEDURE — 97163 PT EVAL HIGH COMPLEX 45 MIN: CPT

## 2020-09-03 PROCEDURE — 82607 VITAMIN B-12: CPT | Performed by: PHYSICIAN ASSISTANT

## 2020-09-03 PROCEDURE — 85705 THROMBOPLASTIN INHIBITION: CPT | Performed by: PHYSICIAN ASSISTANT

## 2020-09-03 PROCEDURE — 85027 COMPLETE CBC AUTOMATED: CPT | Performed by: INTERNAL MEDICINE

## 2020-09-03 PROCEDURE — 97116 GAIT TRAINING THERAPY: CPT

## 2020-09-03 PROCEDURE — 86146 BETA-2 GLYCOPROTEIN ANTIBODY: CPT | Performed by: PHYSICIAN ASSISTANT

## 2020-09-03 PROCEDURE — 86147 CARDIOLIPIN ANTIBODY EA IG: CPT | Performed by: PHYSICIAN ASSISTANT

## 2020-09-03 PROCEDURE — 82746 ASSAY OF FOLIC ACID SERUM: CPT | Performed by: PHYSICIAN ASSISTANT

## 2020-09-03 RX ORDER — KETOROLAC TROMETHAMINE 30 MG/ML
15 INJECTION, SOLUTION INTRAMUSCULAR; INTRAVENOUS EVERY 6 HOURS PRN
Status: DISCONTINUED | OUTPATIENT
Start: 2020-09-03 | End: 2020-09-04 | Stop reason: HOSPADM

## 2020-09-03 RX ORDER — ALPRAZOLAM 0.5 MG/1
0.5 TABLET ORAL
Status: DISCONTINUED | OUTPATIENT
Start: 2020-09-03 | End: 2020-09-04 | Stop reason: HOSPADM

## 2020-09-03 RX ORDER — IBUPROFEN 600 MG/1
600 TABLET ORAL EVERY 6 HOURS PRN
Status: DISCONTINUED | OUTPATIENT
Start: 2020-09-03 | End: 2020-09-04 | Stop reason: HOSPADM

## 2020-09-03 RX ADMIN — OXYCODONE HYDROCHLORIDE 5 MG: 5 TABLET ORAL at 04:01

## 2020-09-03 RX ADMIN — PANTOPRAZOLE SODIUM 40 MG: 40 TABLET, DELAYED RELEASE ORAL at 05:53

## 2020-09-03 RX ADMIN — KETOROLAC TROMETHAMINE 15 MG: 30 INJECTION, SOLUTION INTRAMUSCULAR at 11:58

## 2020-09-03 RX ADMIN — MAGNESIUM HYDROXIDE 30 ML: 400 SUSPENSION ORAL at 05:52

## 2020-09-03 RX ADMIN — ENOXAPARIN SODIUM 40 MG: 40 INJECTION SUBCUTANEOUS at 09:19

## 2020-09-03 RX ADMIN — TIZANIDINE 4 MG: 4 TABLET ORAL at 17:03

## 2020-09-03 RX ADMIN — PREGABALIN 75 MG: 25 CAPSULE ORAL at 09:20

## 2020-09-03 RX ADMIN — MORPHINE SULFATE 2 MG: 2 INJECTION, SOLUTION INTRAMUSCULAR; INTRAVENOUS at 22:08

## 2020-09-03 RX ADMIN — TIZANIDINE 4 MG: 4 TABLET ORAL at 09:20

## 2020-09-03 RX ADMIN — OXYCODONE HYDROCHLORIDE 5 MG: 5 TABLET ORAL at 21:03

## 2020-09-03 RX ADMIN — MORPHINE SULFATE 2 MG: 2 INJECTION, SOLUTION INTRAMUSCULAR; INTRAVENOUS at 17:10

## 2020-09-03 RX ADMIN — PREGABALIN 75 MG: 25 CAPSULE ORAL at 17:03

## 2020-09-03 RX ADMIN — MORPHINE SULFATE 2 MG: 2 INJECTION, SOLUTION INTRAMUSCULAR; INTRAVENOUS at 01:14

## 2020-09-03 RX ADMIN — MORPHINE SULFATE 2 MG: 2 INJECTION, SOLUTION INTRAMUSCULAR; INTRAVENOUS at 12:37

## 2020-09-03 RX ADMIN — TIZANIDINE 4 MG: 4 TABLET ORAL at 21:03

## 2020-09-03 RX ADMIN — LORATADINE 10 MG: 10 TABLET ORAL at 09:20

## 2020-09-03 RX ADMIN — ALPRAZOLAM 0.5 MG: 0.5 TABLET ORAL at 22:07

## 2020-09-03 RX ADMIN — NICOTINE 1 PATCH: 14 PATCH TRANSDERMAL at 09:21

## 2020-09-03 RX ADMIN — BUDESONIDE AND FORMOTEROL FUMARATE DIHYDRATE 2 PUFF: 160; 4.5 AEROSOL RESPIRATORY (INHALATION) at 18:17

## 2020-09-03 RX ADMIN — BUDESONIDE AND FORMOTEROL FUMARATE DIHYDRATE 2 PUFF: 160; 4.5 AEROSOL RESPIRATORY (INHALATION) at 09:27

## 2020-09-03 RX ADMIN — MORPHINE SULFATE 2 MG: 2 INJECTION, SOLUTION INTRAMUSCULAR; INTRAVENOUS at 05:53

## 2020-09-03 RX ADMIN — GADOBUTROL 5 ML: 604.72 INJECTION INTRAVENOUS at 23:51

## 2020-09-03 NOTE — PROGRESS NOTES
Progress Note - Andree Rizo 1980, 47 y o  female MRN: [id number]  Unit/Bed#: E4 -01 Encounter: [id number]  Primary Care Provider: MARSHA Kramer   Date and time admitted to hospital: 9/2/2020  5:15 PM    * Tremors of nervous system  Assessment & Plan  · Tremors of nervous system  Patient reports episodes happen 3-4 times yearly  Usually spontaneously resolves but this time happened at work  · Patient denies any increased anxiety  No evidence of electrolyte disturbances  TSH within limits  · Neurology input appreciated  · Awaiting MRI  · Heavy metal screen pending  Ammonia normal   CK normal   Vitamin-B and folate checked  Cardiolipin antibody pending  · Was given diazepam on route and attempt to walk patient in ED unsuccessful with ambulatory dysfunction  · PT evaluated    Tobacco abuse  Assessment & Plan  Nicotine replacement patch    Rheumatoid arthritis ([facility] [address] )  Assessment & Plan  · Rheumatoid arthritis scheduled to see Rheumatology later this month  · Does have chronic pain on pregabalin  Reviewed on   · Given whole body pain with spasms, is CK was checked and is normal     Hepatitis C  Assessment & Plan  · History of hepatitis-C    Dyspepsia  Assessment & Plan  · GERD continue PPI    Moderate persistent asthma without complication  Assessment & Plan  · Asthma without exacerbation  Continue symbicort singulair and loratadine    Hypothyroidism  Assessment & Plan  · History of hypothyroidism but stop taking medications  TSH within limits    VTE Pharmacologic Prophylaxis:   Pharmacologic: Enoxaparin (Lovenox)  Mechanical VTE Prophylaxis in Place: Yes    Patient Centered Rounds: I have performed bedside rounds with nursing staff today  Discussions with Specialists or Other Care Team Provider:  Discussed with Neurology, Internal Medicine, nursing    Education and Discussions with Family / Patient:  Patient declined phone call to family  Time Spent for Care: 45 minutes    More than 50% of total time spent on counseling and coordination of care as described above  Current Length of Stay: 0 day(s)    Current Patient Status: Observation   Certification Statement: The patient will continue to require additional inpatient hospital stay due to Awaiting MRI    Discharge Plan:  Pending, awaiting MRI per Neurology recommendations  Code Status: Level 1 - Full Code      Subjective:   Patient was examined earlier this morning  She was complaining of headache  She has chronic back pain  She did not complain of tremors at the time  No shortness of breath or chest pain  Objective:     Vitals:   Temp (24hrs), Av 9 °F (36 6 °C), Min:97 6 °F (36 4 °C), Max:98 4 °F (36 9 °C)    Temp:  [97 6 °F (36 4 °C)-98 4 °F (36 9 °C)] 97 6 °F (36 4 °C)  HR:  [82-93] 93  Resp:  [18] 18  BP: (112-137)/(67-91) 134/87  SpO2:  [96 %-99 %] 96 %  Body mass index is 21 57 kg/m²  Input and Output Summary (last 24 hours):     No intake or output data in the 24 hours ending 20 [id number]    Physical Exam:     Physical Exam  Vitals signs and nursing note reviewed  Exam conducted with a chaperone present  Constitutional:       General: She is not in acute distress  Appearance: Normal appearance  She is not ill-appearing, toxic-appearing or diaphoretic  Eyes:      General:         Right eye: No discharge  Left eye: No discharge  Cardiovascular:      Rate and Rhythm: Normal rate and regular rhythm  Heart sounds: No murmur  Pulmonary:      Effort: Pulmonary effort is normal  No respiratory distress  Breath sounds: Normal breath sounds  No stridor  Abdominal:      General: There is no distension  Palpations: There is no mass  Tenderness: There is no abdominal tenderness  Hernia: No hernia is present  Skin:     General: Skin is warm and dry  Coloration: Skin is not pale  Findings: No bruising  Neurological:      General: No focal deficit present  Mental Status: She is alert and oriented to person, place, and time  Mental status is at baseline  Psychiatric:         Mood and Affect: Mood normal          Behavior: Behavior normal          Additional Data:     Labs:    Results from last 7 days   Lab Units 09/03/20  0605 09/02/20  1407   WBC Thousand/uL 5 44 6 82   HEMOGLOBIN g/dL 12 3 13 4   HEMATOCRIT % 38 2 41 0   PLATELETS Thousands/uL 176 190   NEUTROS PCT %  --  61   LYMPHS PCT %  --  30   MONOS PCT %  --  6   EOS PCT %  --  2     Results from last 7 days   Lab Units 09/03/20  0605   SODIUM mmol/L 141   POTASSIUM mmol/L 3 9   CHLORIDE mmol/L 108   CO2 mmol/L 24   BUN mg/dL 19   CREATININE mg/dL 0 82   ANION GAP mmol/L 9   CALCIUM mg/dL 7 8*   ALBUMIN g/dL 3 1*   TOTAL BILIRUBIN mg/dL 0 25   ALK PHOS U/L 31*   ALT U/L 62   AST U/L 32   GLUCOSE RANDOM mg/dL 119                           * I Have Reviewed All Lab Data Listed Above  * Additional Pertinent Lab Tests Reviewed:  All Labs Within Last 24 Hours Reviewed      Recent Cultures (last 7 days):           Last 24 Hours Medication List:   Current Facility-Administered Medications   Medication Dose Route Frequency Provider Last Rate    albuterol  2 puff Inhalation Q4H PRN Ko Fuller PA-C      ALPRAZolam  0 5 mg Oral 30 min pre-procedure Hayden Cho PA-C      budesonide-formoterol  2 puff Inhalation BID Negrita Laguerre, DO      enoxaparin  40 mg Subcutaneous Q24H [facility] Dominick Monsivais DO      ibuprofen  600 mg Oral Q6H PRN Ashley Laws PA-C      ketorolac  15 mg Intravenous Q6H PRN Ashley Laws PA-C      loratadine  10 mg Oral Daily Negrita Laguerre DO      magnesium hydroxide  30 mL Oral BID PRN Negrita Laguerre DO      montelukast  10 mg Oral HS Dominick Monsivais DO      morphine injection  2 mg Intravenous Q4H PRN Ashley Laws PA-C      nicotine  1 patch Transdermal Daily Dominick Monsivais DO      ondansetron  4 mg Intravenous Q4H PRN Negrita Laguerre, DO      oxyCODONE  5 mg Oral Q4H PRN Steven Catching, DO      pantoprazole  40 mg Oral Early Morning Steven Catching, DO      pregabalin  75 mg Oral BID Steven Catching, DO      tiZANidine  4 mg Oral TID Dominick Monsivais, DO      valACYclovir  500 mg Oral Daily Steven Catching, DO          Today, Patient Was Seen By: Doc Starks PA-C    ** Please Note: Dictation voice to text software may have been used in the creation of this document   **

## 2020-09-03 NOTE — UTILIZATION REVIEW
Initial Clinical Review    Admission: Date/Time/Statement:   Admission Orders (From admission, onward)     Ordered        09/02/20 1824  Place in Observation  Once                   Orders Placed This Encounter   Procedures    Place in Observation     Standing Status:   Standing     Number of Occurrences:   1     Order Specific Question:   Admitting Physician     Answer:   Paul Caballero     Order Specific Question:   Level of Care     Answer:   Med Surg [16]     ED Arrival Information     Expected Arrival Acuity Means of Arrival Escorted By Service Admission Type    - 9/2/2020 16:53 Urgent Walk-In Self General Medicine Urgent    Arrival Complaint    tremors, weakness        Chief Complaint   Patient presents with    Weakness - Generalized     pt reports just left AMA to smoke a cigarette and is back to be admitted  reports weakness and tremors, soreness, and unsteadiness      Assessment/Plan:  48 yo female presents to ED via EMS with  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  Pt states her spasms start in her RUE, move to her LUE and into florentino LE  Describes these spasms as repetitive muscle spasms and jerking and when  spasms move into her legs she has trouble with maintaining balance and walking  Pt reported a fall while at work onto R knee, no head strike or LOC  Supervisor ws concerned for seizure and sent to ED  EMS gave Valium prior to arrival  Pt states that she gets muscle spasms and tremor episodes about 2-3 times a year  PMH chronic back pain, RA, HSV, COPD, asthma, tobacco abuse, hypothyroid, fibromyalgia  On exam: intermittent muscle spasms and jerking of florentino UE, trunk and florentino LE , Tenderness noted to right anterior knee over patella, Small area of ecchymosis noted to right knee  CT Head neg  R Knee Xray neg  CBC & CMP wnl's  Admitted to Observation with Tremors of nervous system    Plan: Check MRI, Neuro consult, Neuro checks, PT Eval    9/3 Per Neuro: Unclear etiology of tremors  Description of symptoms sound more consistent with myoclonus  Do not suspect seizures  Left leg tone: normal No bradykinesia noted  Mild asterixis with B/L UE  Neuro w/u including MRI, check heavy metals screen, B12, folate, [place] level      ED Triage Vitals   Temperature Pulse Respirations Blood Pressure SpO2   09/02/20 1659 09/02/20 1659 09/02/20 1659 09/02/20 1659 09/02/20 1659   98 2 °F (36 8 °C) 92 18 155/92 96 %      Temp Source Heart Rate Source Patient Position - Orthostatic VS BP Location FiO2 (%)   09/02/20 1659 -- 09/02/20 1900 09/02/20 1900 --   Temporal  Sitting Right arm       Pain Score       09/02/20 2008       7          Wt Readings from Last 1 Encounters:   09/02/20 57 kg (125 lb 10 6 oz)     Additional Vital Signs:   09/03/20 0816   97 6 °F (36 4 °C)   86   18   131/74  99 %  None (Room air)  Sitting    09/02/20 2258   97 8 °F (36 6 °C)   92   18   126/76  98 %  None (Room air)  Lying    09/02/20 1900   98 3 °F (36 8 °C)   82   18   137/91  97 %  None (Room air)  Sitting       Pertinent Labs/Diagnostic Test Results:   Results from last 7 days   Lab Units 09/03/20  0605 09/02/20  1407   WBC Thousand/uL 5 44 6 82   HEMOGLOBIN g/dL 12 3 13 4   HEMATOCRIT % 38 2 41 0   PLATELETS Thousands/uL 176 190   NEUTROS ABS Thousands/µL  --  4 16     Results from last 7 days   Lab Units 09/03/20  0605 09/02/20  1407   SODIUM mmol/L 141 140   POTASSIUM mmol/L 3 9 3 8   CHLORIDE mmol/L 108 107   CO2 mmol/L 24 29   ANION GAP mmol/L 9 4   BUN mg/dL 19 17   CREATININE mg/dL 0 82 0 82   EGFR ml/min/1 73sq m 91 91   CALCIUM mg/dL 7 8* 8 4   MAGNESIUM mg/dL  --  1 9     Results from last 7 days   Lab Units 09/03/20  0605 09/02/20  1407   AST U/L 32 45   ALT U/L 62 72   ALK PHOS U/L 31* 24*   TOTAL PROTEIN g/dL 6 2* 6 8   ALBUMIN g/dL 3 1* 3 5   TOTAL BILIRUBIN mg/dL 0 25 0 52     Results from last 7 days   Lab Units 09/03/20  0605 09/02/20  1407   GLUCOSE RANDOM mg/dL 119 75     Results from last 7 days   Lab Units 09/03/20  0605   CK TOTAL U/L 83     Results from last 7 days   Lab Units 09/02/20  1407   TSH 3RD GENERATON uIU/mL 1 664     9/2 CT Head: No acute intracranial abnormality    9/2 Right Knee Xray: No acute osseous abnormality    MRI  PENDING    ED Treatment:   Medication Administration from 09/02/2020 1652 to 09/02/2020 1907       Date/Time Order Dose Route Action     09/02/2020 1753 sodium chloride 0 9 % bolus 1,000 mL 1,000 mL Intravenous New Bag     09/02/2020 1756 ketorolac (TORADOL) injection 15 mg 15 mg Intravenous Not Given        Past Medical History:   Diagnosis Date    Anemia     Anxiety     Arthritis     Asthma     Coronary artery disease     Disease of thyroid gland     GERD (gastroesophageal reflux disease)     History of heroin use     herion last used 3 5 years     Hypertension     Hypothyroidism     Infectious viral hepatitis     Mitral valve regurgitation     Myocardial infarction (HCC)     Pneumonia     RA [facility]rhe[facility]oid arthritis) ([facility] [address] )     Tobacco abuse     Urinary tract infection      Present on Admission:   Hypothyroidism   Mitral valve regurgitation   Moderate persistent asthma without complication   Tremors of nervous system   Hepatitis C   Dyspepsia   Rheumatoid arthritis (HCC)      Admitting Diagnosis: Low back pain [M54 5]  Weakness [R53 1]  Muscle spasm [M62 838]  Tremors of nervous system [R25 1]  Ambulatory dysfunction [R26 2]  Age/Sex: 47 y o  female     Admission Orders:  Scheduled Medications:  budesonide-formoterol, 2 puff, Inhalation, BID  enoxaparin, 40 mg, Subcutaneous, Q24H MAYA  loratadine, 10 mg, Oral, Daily  montelukast, 10 mg, Oral, HS  nicotine, 1 patch, Transdermal, Daily  pantoprazole, 40 mg, Oral, Early Morning  pregabalin, 75 mg, Oral, BID  tiZANidine, 4 mg, Oral, TID  valACYclovir, 500 mg, Oral, Daily    Continuous IV Infusions:  sodium chloride, 84 mL/hr, Intravenous, Continuous    PRN Meds:  acetaminophen, 650 mg, Oral, Q6H PRN  albuterol, 2 puff, Inhalation, Q4H PRN  ketorolac, 15 mg, Intravenous, Q6H PRN  magnesium hydroxide, 30 mL, Oral, BID PRN x 1 9/3  morphine injection, 2 mg, Intravenous, Q4H PRN x 1 9/2  & x 2 9/3  ondansetron, 4 mg, Intravenous, Q4H PRN  oxyCODONE, 5 mg, Oral, Q4H PRN x 1 9/2 & x 1 9/3    Neuro checks q4h  IP CONSULT TO NEUROLOGY    Network Utilization Review Department  [email or web address]  ATTENTION: Please call with any questions or concerns to [phone] and carefully listen to the prompts so that you are directed to the right person  All voicemails are confidential   Jose Torres all requests for admission clinical reviews, approved or denied determinations and any other requests to dedicated fax number below belonging to the campus where the patient is receiving treatment   List of dedicated fax numbers for the Facilities:  [address] DENIALS (Administrative/Medical Necessity) [phone]   [address] (Maternity/NICU/Pediatrics) [phone]   Conda Sas [phone]   Jamas Piggs [phone]   Lucy Dress [phone]   Levell Pleasure [phone]   1205 [facility] 1525 [facility] [phone]   Ladene [facility] [phone]   2205 [facility] [address]  2401 [facility] [address] [facility] [phone]

## 2020-09-03 NOTE — PLAN OF CARE
Problem: PHYSICAL THERAPY ADULT  Goal: Performs mobility at highest level of function for planned discharge setting  See evaluation for individualized goals  Description: Treatment/Interventions: Functional transfer training, LE strengthening/ROM, Elevations, Therapeutic exercise, Endurance training, Gait training, Bed mobility          See flowsheet documentation for full assessment, interventions and recommendations  Note: Prognosis: Good  Problem List: Decreased strength, Decreased endurance, Impaired balance, Decreased mobility, Impaired judgement, Pain  Assessment: Pt is 47 y o  female seen for PT evaluation s/p admit to [address] [place] [address] on 9/2/2020 w/ Tremors of nervous system  PT consulted to assess pt's functional mobility and d/c needs  Order placed for PT eval and tx, w/ activity order  Comorbidities affecting pt's physical performance at time of assessment include: RA, Hepatitis C, mtiral valve regurgitation, and anxiety  PTA, pt was independent w/ all functional mobility w/ no AD  Personal factors affecting pt at time of IE include: inability to navigate community distances, unable to perform dynamic tasks in community, limited home support, anxiety and inability to perform current job functions  Please find objective findings from PT assessment regarding body systems outlined above with impairments and limitations including weakness, impaired balance, decreased endurance, impaired coordination, gait deviations, pain, decreased activity tolerance, decreased functional mobility tolerance and fall risk  The following objective measures performed on IE also reveal limitations: Modified [place]: 3  Pt's clinical presentation is currently evolving seen in pt's presentation of undiagnosed tremors of the nervous system  Pt to benefit from continued PT tx to address deficits as defined above and maximize level of functional independent mobility and consistency   From PT/mobility standpoint, recommendation at time of d/c would be Home PT pending progress in order to facilitate return to PLOF  PT Discharge Recommendation: Home with skilled therapy          See flowsheet documentation for full assessment

## 2020-09-03 NOTE — PROGRESS NOTES
Patient had concerns about her pain medications  I had the PA lazara Kidd address her concerns 2 different times  Medina Charles and I also addressed it with the attending Dr Salty Diaz  I explained in detail the changes made by the provider a few times as she kept asking for morphine  At one point it was a prn for severe pain  Then it was d/c'd  The patient said she had a headache  I attempted to give Ibuprofen  As per her orders, then she said, It doesn't do anything for my back pain and or my migraines  Pain score was a 8-9/10  I then discussed it with Bernabe again and they added Toradol  Toradol was pulled and attempt was made to give it, but she said it would not help her at all either  I spoke to Lelo Ibrahim again and she reiterated again that her morphine is for breakthru pain only  The patient became more irritable and agitated, stating "I don't make any sense, You don't know what your doing" and proceeded on speaking to the  Guan[place]ito Mojica and the doctor again  I did contact both and Dr Rahul Moreno and Veronika Melgoza  They both went together to speak to the patient and a new nurse took over her assignment

## 2020-09-03 NOTE — ASSESSMENT & PLAN NOTE
- Unclear etiology  Patient notes intermittent tremors worse with action and description of symptoms sound more consistent with myoclonus  Do not suspect seizures; patient denies LOC, bowel/bladder incontinence and notes that all 4 limbs are generally involved making a focal seizure unlikely    -onset was approximately 7 years ago after a gastrointestinal illness      - MRI brain and C-spine essentially unremarkable studies    - B12 and folate within normal limits  -pending labs include celiac antibodies, iron panel, Sjogren's antibodies, ACE, serum copper, ceruloplasmin, lupus anticoagulant, beta 2 glycoprotein, cardiolipin antibody  Unfortunately, patient left the hospital before cryoglobulins could be drawn  Patient to follow with the movement disorder clinic upon discharge  Appointment requested

## 2020-09-03 NOTE — ASSESSMENT & PLAN NOTE
- Patient with positive RF screen and RF quantification 10 IU/mL  - Planning to see rheumatology as an outpatient  She does complain of diffuse body pain and is on pregabalin 75 mg QD as an outpatient  She was previously taking Gabapentin but notes that this was discontinued when she started pregabalin

## 2020-09-03 NOTE — PLAN OF CARE
Problem: Potential for Falls  Goal: Patient will remain free of falls  Description: INTERVENTIONS:  - Assess patient frequently for physical needs  -  Identify cognitive and physical deficits and behaviors that affect risk of falls    -  [place] fall precautions as indicated by assessment   - Educate patient/family on patient safety including physical limitations  - Instruct patient to call for assistance with activity based on assessment  - Modify environment to reduce risk of injury  - Consider OT/PT consult to assist with strengthening/mobility  Outcome: Progressing

## 2020-09-03 NOTE — ASSESSMENT & PLAN NOTE
· Tremors of nervous system  Patient reports episodes happen 3-4 times yearly  Usually spontaneously resolves but this time happened at work  · Patient denies any increased anxiety  No evidence of electrolyte disturbances  TSH within limits  · Neurology input appreciated  · Awaiting MRI  · Heavy metal screen pending  Ammonia normal   CK normal   Vitamin-B and folate checked  Cardiolipin antibody pending  · Was given diazepam on route and attempt to walk patient in ED unsuccessful with ambulatory dysfunction    · PT evaluated

## 2020-09-03 NOTE — PLAN OF CARE
Problem: Potential for Falls  Goal: Patient will remain free of falls  Description: INTERVENTIONS:  - Assess patient frequently for physical needs  -  Identify cognitive and physical deficits and behaviors that affect risk of falls    -  [place] fall precautions as indicated by assessment   - Educate patient/family on patient safety including physical limitations  - Instruct patient to call for assistance with activity based on assessment  - Modify environment to reduce risk of injury  - Consider OT/PT consult to assist with strengthening/mobility  Outcome: Progressing

## 2020-09-03 NOTE — PHYSICAL THERAPY NOTE
Physical Therapy Evaluation    Patient Name: Eriberto Brown    ASQPY'K Date: 9/3/2020     Problem List  Principal Problem:    Tremors of nervous system  Active Problems:    Mitral valve regurgitation    Hypothyroidism    Moderate persistent asthma without complication    Dyspepsia    Hepatitis C    Rheumatoid arthritis ([facility] )    Tobacco abuse       Past Medical History  Past Medical History:   Diagnosis Date    Anemia     Anxiety     Arthritis     Asthma     Coronary artery disease     Disease of thyroid gland     GERD (gastroesophageal reflux disease)     History of heroin use     herion last used 3 5 years     Hypertension     Hypothyroidism     Infectious viral hepatitis     Mitral valve regurgitation     Myocardial infarction (HCC)     Pneumonia     [facility]     Tobacco abuse     Urinary tract infection         Past Surgical History  Past Surgical History:   Procedure Laterality Date    APPENDECTOMY      OVARIAN CYST REMOVAL          09/03/20 1529   Note Type   Note type Eval/Treat   Pain Assessment   Pain Assessment Tool 0-10   Pain Score 9   Pain Location/Orientation Location: Back   Home Living   Type of [address] [place] [address] Multi-level;Bed/bath upstairs;Stairs to enter without rails  (3 DIMITRY, full flight to second floor with rail)   Bathroom Shower/Tub Walk-in shower   Bathroom Toilet Standard   Bathroom Accessibility Accessible   Home Equipment   (no DME)   Prior Function   Level of [place] Independent with ADLs and functional mobility   Lives With Friend(s)   Receives Help From Friend(s); Family   ADL Assistance Independent   IADLs Independent   Restrictions/Precautions   Weight Bearing Precautions Per Order No   Other Precautions Fall Risk;Pain   Cognition   Overall Cognitive Status WFL   Arousal/Participation Cooperative   Orientation Level Oriented X4   Memory Within functional limits   Following Commands Follows all commands and directions without difficulty   RLE Assessment   RLE Assessment X  (4-/5, tremors noted)   LLE Assessment   LLE Assessment X  (4-/5, tremors noted)   Coordination   Movements are Fluid and Coordinated 0   Coordination and Movement Description tremors noted in all extremities   Light Touch   RLE Light Touch Grossly intact   LLE Light Touch Grossly intact   Proprioception   RLE Proprioception Grossly intact   LLE Proprioception Grossly Intact   Bed Mobility   Supine to Sit 5  Supervision   Additional items Increased time required;Verbal cues   Sit to Supine 5  Supervision   Additional items Increased time required;Verbal cues; Bedrails   Transfers   Sit to Stand 5  Supervision   Additional items Increased time required;Verbal cues   Stand to Sit 5  Supervision   Additional items Increased time required;Verbal cues   Toilet transfer 5  Supervision   Additional items Verbal cues;Standard toilet   Ambulation/Elevation   Gait pattern Excessively slow; Ataxia; Step to; Improper Weight shift  (BLE tremors noted)   Gait Assistance 5  Supervision   Additional items Verbal cues   Assistive Device Rolling walker;None   Distance 100 ft with RW and with no AD   Balance   Static Sitting Fair +   Dynamic Sitting Fair +   Static Standing Fair -   Dynamic Standing Fair -   Ambulatory Fair -   Endurance Deficit   Endurance Deficit Yes   Activity Tolerance   Activity Tolerance Patient limited by fatigue   Assessment   Prognosis Good   Problem List Decreased strength;Decreased endurance; Impaired balance;Decreased mobility; Impaired judgement;Pain   Assessment Pt is 47 y o  female seen for PT evaluation s/p admit to [facility] on 9/2/2020 w/ Tremors of nervous system  PT consulted to assess pt's functional mobility and d/c needs  Order placed for PT eval and tx, w/ activity order   Comorbidities affecting pt's physical performance at time of assessment include: RA, Hepatitis C, mtiral valve regurgitation, and anxiety  PTA, pt was independent w/ all functional mobility w/ no AD  Personal factors affecting pt at time of IE include: inability to navigate community distances, unable to perform dynamic tasks in community, limited home support, anxiety and inability to perform current job functions  Please find objective findings from PT assessment regarding body systems outlined above with impairments and limitations including weakness, impaired balance, decreased endurance, impaired coordination, gait deviations, pain, decreased activity tolerance, decreased functional mobility tolerance and fall risk  The following objective measures performed on IE also reveal limitations: Modified [place]: 3  Pt's clinical presentation is currently evolving seen in pt's presentation of undiagnosed tremors of the nervous system  Pt to benefit from continued PT tx to address deficits as defined above and maximize level of functional independent mobility and consistency  From PT/mobility standpoint, recommendation at time of d/c would be Home PT pending progress in order to facilitate return to PLOF  Goals   STG Expiration Date 09/13/20   Short Term Goal #1 In 10 days, 1  Patient will complete all bed mobility tasks independently to return PLOF  2  Patient will complete all transfers with no AD independently to return to PLOF  3  Patient will ambulate 250 ft with no AD independently to return to community with reduced risk of falls  4  Patient will ascend/descend 12 steps with rails independently to return to prior living environment  5  Patient will increase BLE strength to 4+/5 to reduce the risk of falls  Plan   Treatment/Interventions Functional transfer training;LE strengthening/ROM; Elevations; Therapeutic exercise; Endurance training;Gait training;Bed mobility   PT Frequency Other (Comment)  (3-5x/week)   Recommendation   PT Discharge Recommendation Home with skilled therapy   Modified [place] Scale   Modified [place] Scale 3     Pt supine in bed with all needs in reach at end of session

## 2020-09-03 NOTE — CONSULTS
Consultation - Neurology   Juanita Pressley 47 y o  female MRN: [id number]  Unit/Bed#: E4 -01 Encounter: [id number]      Assessment/Plan   47year old female with hypothyroidism, asthma, chronic low back pain, tobacco abuse, anxiety, history of hepatitis C and IVDA admitted with complaints of tremors  * Tremors of nervous system  Assessment & Plan   - Unclear etiology  Patient notes intermittent tremors worse with action and description of symptoms sound more consistent with myoclonus  Do not suspect seizures, patient denies LOC, bowel/bladder incontinence and notes that all 4 limbs are generally involved making a focal seizure unlikely  - Will pursue neurologic work-up including MRI brain with and without contrast  Patient notes she is claustrophobic, offered Ativan but she notes she does not tolerate this due to having "black outs" with this  She has tolerated Xanax in the past  Will give 0 5 mg Xanax prior to MRI  [place] [facility] queried and records notable for pregabalin prescription     - Will check heavy metals screen  - Check Vit B12 and folate  - Check ammonia level as patient does have asterixis on exam as well as her noted history of Hepatitis C     - Monitor exam and notify with changes  Hypothyroidism  Assessment & Plan   - TSH 1 664 on admission  Hepatitis C  Assessment & Plan   - Follows with GI team as an outpatient and is planned to start treatment     - LFTs normal on admission  Will check ammonia level for completeness given asterixis on exam     Rheumatoid arthritis ([address] )  Assessment & Plan   - Patient with positive RF screen and RF quantification 10 IU/mL  - Planning to see rheumatology as an outpatient  She does complain of diffuse body pain and is on pregabalin 75 mg QD as an outpatient  She was previously taking Gabapentin but notes that this was discontinued when she started pregabalin  Tobacco abuse  Assessment & Plan   - Encourage tobacco cessation  Recommendations for outpatient neurological follow up have yet to be determined  History of Present Illness     Reason for Consult / Principal Problem: Tremors of the nervous system    HPI: Fauzia Gross is a 47 y o  female who presents with complaints of tremors/jerking movements of her extremities  She notes that about 7 years ago, she became very ill for several months and had episodes of "episodic paralysis", tremors and full body pain  She notes she was diagnosed with hypothyroidism and then some of her symptoms improved after she began treatment  She notes that since that time, she has bee having intermittent (usually about 2 times per year) episodes of jerking movements of her extremities  She notes that this "feels like a shock moving through body" and then her extremities will begin jerking  She notes that typically she will lay down and "ride it out" and the symptoms generally resolve after a few hours  She notes she has never lost consciousness or had bowel/bladder incontinence with any of these episodes  She notes that they are generally worse with movement and yesterday this 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 and she had an associated fall  She notes this prompted her co-workers to call EMS and she was brought to the hospital  She notes she has a history of hepatitis C and has not been treated for this as of yet but she is seeing GI and hopes to begin treatment soon  She does have a history of drug use and smoked K2 and used IV heroin  She notes she has been clean for 1 year  She follows with her PCP regarding "full body pain" and notes she recently had a positive RF and is planning to see rheumatology for further evaluation  She notes she is currently feeling like she is "at the tail end" of an episode and complains of full body pain but no further tremoring   Per review of chart, patient with hypothyroidism, asthma, chronic low back pain, tobacco abuse, anxiety, migraine, HSV and history of hepatitis C and IVDA  Inpatient consult to Neurology  Consult performed by: Renetta Roberts PA-C  Consult ordered by: Yessi Kent DO          Review of Systems   Constitutional: Positive for fatigue  Negative for chills and fever  HENT: Negative for trouble swallowing  Eyes: Negative for visual disturbance  Respiratory: Negative for shortness of breath  Cardiovascular: Negative for chest pain  Gastrointestinal: Positive for constipation  Negative for nausea and vomiting  Genitourinary: Positive for difficulty urinating  Negative for dysuria  Musculoskeletal: Positive for arthralgias, back pain, gait problem and neck pain  Skin: Negative for rash  Neurological: Positive for tremors and headaches  Negative for dizziness, speech difficulty, weakness, light-headedness and numbness  Psychiatric/Behavioral: Negative for confusion         Historical Information   Past Medical History:   Diagnosis Date    Anemia     Anxiety     Arthritis     Asthma     Coronary artery disease     Disease of thyroid gland     GERD (gastroesophageal reflux disease)     History of heroin use     herion last used 3 5 years     Hypertension     Hypothyroidism     Infectious viral hepatitis     Mitral valve regurgitation     Myocardial infarction (HCC)     Pneumonia     RA (rheumatoid [address]) ([facility] )     Tobacco abuse     Urinary tract infection      Past Surgical History:   Procedure Laterality Date    APPENDECTOMY      OVARIAN CYST REMOVAL       Social History   Social History     Substance and Sexual Activity   Alcohol Use No     Social History     Substance and Sexual Activity   Drug Use Not Currently    Comment: gel for arthrisis     E-Cigarette/Vaping    E-Cigarette Use Never User      E-Cigarette/Vaping Substances     Social History     Tobacco Use   Smoking Status Current Every Day Smoker    Packs/day: 1 00    Years: 25 00    Pack years: 25 00    Types: Cigarettes    Start date: 1995   Smokeless Tobacco Never Used     Family History:   Family History   Problem Relation Age of Onset    COPD Mother     No Known Problems Father     Asthma Brother     No Known Problems Brother        Review of previous medical records was completed  Please see HPI       Meds/Allergies   Scheduled Meds:  Current Facility-Administered Medications   Medication Dose Route Frequency Provider Last Rate    acetaminophen  650 mg Oral Q6H PRN Juwan Pilar, DO      albuterol  2 puff Inhalation Q4H PRN Damien Baird PA-C      budesonide-formoterol  2 puff Inhalation BID Juwan Pilar, DO      enoxaparin  40 mg Subcutaneous Q24H [address] Juwan Pilar, DO      ketorolac  15 mg Intravenous Q6H PRN Juwan Pilar, DO      loratadine  10 mg Oral Daily Juwan Pilar, DO      magnesium hydroxide  30 mL Oral BID PRN Juwan Pilar, DO      montelukast  10 mg Oral HS Dominick Monsivais, DO      morphine injection  2 mg Intravenous Q4H PRN Juwan Pilar, DO      nicotine  1 patch Transdermal Daily Dominick Monsivais, DO      ondansetron  4 mg Intravenous Q4H PRN Juwan Pilar, DO      oxyCODONE  5 mg Oral Q4H PRN Juwan Pilar, DO      pantoprazole  40 mg Oral Early Morning Juwan Pilar, DO      pregabalin  75 mg Oral BID Juwan Pilar, DO      sodium chloride  84 mL/hr Intravenous Continuous Juwan Pilar, DO 84 mL/hr (09/02/20 2020)    tiZANidine  4 mg Oral TID Juwan Pilar, DO      valACYclovir  500 mg Oral Daily Dominick Monsivais, DO       Continuous Infusions:sodium chloride, 84 mL/hr, Last Rate: 84 mL/hr (09/02/20 2020)      PRN Meds:   acetaminophen    albuterol    ketorolac    magnesium hydroxide    morphine injection    ondansetron    oxyCODONE      Allergies   Allergen Reactions    Ativan [Lorazepam]     Coconut Oil      coconut    Nuts     Penicillins     Shellfish-Derived Products     Tegretol [Carbamazepine]        Objective   Vitals:Blood pressure 126/76, pulse 92, temperature 97 8 °F (36 6 °C), temperature source Temporal, resp  rate 18, weight 57 kg (125 lb 10 6 oz), SpO2 98 %, not currently breastfeeding  ,Body mass index is 21 57 kg/m²  No intake or output data in the 24 hours ending 09/03/20 0732    Invasive Devices: Invasive Devices     Peripheral Intravenous Line            Peripheral IV 09/02/20 Left Hand less than 1 day                Physical Exam  Constitutional:       General: She is not in acute distress  Appearance: Normal appearance  She is not ill-appearing, toxic-appearing or diaphoretic  HENT:      Head: Normocephalic and atraumatic  Mouth/Throat:      Mouth: Mucous membranes are moist       Pharynx: Oropharynx is clear  No oropharyngeal exudate or posterior oropharyngeal erythema  Eyes:      General: No scleral icterus  Right eye: No discharge  Left eye: No discharge  Extraocular Movements: Extraocular movements intact  Conjunctiva/sclera: Conjunctivae normal       Pupils: Pupils are equal, round, and reactive to light  Neck:      Musculoskeletal: Normal range of motion and neck supple  Cardiovascular:      Rate and Rhythm: Normal rate and regular rhythm  Pulmonary:      Effort: Pulmonary effort is normal  No respiratory distress  Breath sounds: Normal breath sounds  Abdominal:      General: There is no distension  Palpations: Abdomen is soft  Tenderness: There is no abdominal tenderness  Musculoskeletal: Normal range of motion  Right lower leg: No edema  Left lower leg: No edema  Skin:     General: Skin is warm and dry  Coloration: Skin is not jaundiced  Findings: No erythema or rash  Neurological:      Mental Status: She is alert and oriented to person, place, and time  Coordination: Finger-Nose-Finger Test normal       Gait: Gait is intact  Deep Tendon Reflexes:      Reflex Scores:       Bicep reflexes are 2+ on the right side and 2+ on the left side  Brachioradialis reflexes are 2+ on the right side and 2+ on the left side  Patellar reflexes are 3+ on the right side and 3+ on the left side  Achilles reflexes are 2+ on the right side and 2+ on the left side  Psychiatric:         Mood and Affect: Mood normal          Speech: Speech normal          Behavior: Behavior normal        Neurologic Exam     Mental Status   Oriented to person, place, and time  Oriented to person  Oriented to place  Oriented to time  Registration: recalls 3 of 3 objects  Recall at 5 minutes: recalls 2 of 3 objects  Attention: normal  Concentration: normal    Speech: speech is normal   Level of consciousness: alert  Knowledge: good  Normal comprehension  Able to spell word "world" forward and backward  Able to perform simple calculations  Cranial Nerves     CN II   Right visual field deficit: none  Left visual field deficit: none     CN III, IV, VI   Pupils are equal, round, and reactive to light  Right pupil: Size: 3 mm  Shape: regular  Reactivity: brisk  Consensual response: intact  Left pupil: Size: 3 mm  Shape: regular  Reactivity: brisk  Consensual response: intact  Nystagmus: none   Diplopia: none  Ophthalmoparesis: none  Upgaze: normal  Downgaze: normal  Conjugate gaze: present    CN V   Right facial sensation deficit: none  Left facial sensation deficit: none    CN VII   Right facial weakness: none  Left facial weakness: none    CN VIII   Hearing: intact    CN IX, X   Palate: symmetric    CN XI   Right sternocleidomastoid strength: normal  Left sternocleidomastoid strength: normal    CN XII   Tongue: not atrophic  Fasciculations: absent  Tongue deviation: none    Motor Exam   Muscle bulk: normal  Overall muscle tone: normal  Right arm tone: normal  Left arm tone: normal  Right arm pronator drift: absent  Left arm pronator drift: absent  Right leg tone: normal  Left leg tone: normalNo bradykinesia noted  Mild asterixis with B/L UE        Sensory Exam   Right arm light touch: normal  Left arm light touch: normal  Right leg light touch: normal  Left leg light touch: normal  Right arm vibration: normal  Left arm vibration: normal  Right leg vibration: normal  Left leg vibration: normal    Gait, Coordination, and Reflexes     Gait  Gait: normal    Coordination   Finger to nose coordination: normal    Tremor   Resting tremor: absent  Intention tremor: absent  Action tremor: absent    Reflexes   Right brachioradialis: 2+  Left brachioradialis: 2+  Right biceps: 2+  Left biceps: 2+  Right patellar: 3+  Left patellar: 3+  Right achilles: 2+  Left achilles: 2+  Right plantar: normal  Left plantar: normal  Right Locke: absent  Left Locke: absent  Right ankle clonus: absent  Left ankle clonus: absent      Lab Results:  Recent Results (from the past 24 hour(s))   CBC and differential    Collection Time: 09/02/20  2:07 PM   Result Value Ref Range    WBC 6 82 4 31 - 10 16 Thousand/uL    RBC 4 35 3 81 - 5 12 Million/uL    Hemoglobin 13 4 11 5 - 15 4 g/dL    Hematocrit 41 0 34 8 - 46 1 %    MCV 94 82 - 98 fL    MCH 30 8 26 8 - 34 3 pg    MCHC 32 7 31 4 - 37 4 g/dL    RDW 12 5 11 6 - 15 1 %    MPV 10 2 8 9 - 12 7 fL    Platelets 205 385 - 346 Thousands/uL    nRBC 0 /100 WBCs    Neutrophils Relative 61 43 - 75 %    Immat GRANS % 0 0 - 2 %    Lymphocytes Relative 30 14 - 44 %    Monocytes Relative 6 4 - 12 %    Eosinophils Relative 2 0 - 6 %    Basophils Relative 1 0 - 1 %    Neutrophils Absolute 4 16 1 85 - 7 62 Thousands/µL    Immature Grans Absolute 0 03 0 00 - 0 20 Thousand/uL    Lymphocytes Absolute 2 04 0 60 - 4 47 Thousands/µL    Monocytes Absolute 0 39 0 17 - 1 22 Thousand/µL    Eosinophils Absolute 0 15 0 00 - 0 61 Thousand/µL    Basophils Absolute 0 05 0 00 - 0 10 Thousands/µL   Comprehensive metabolic panel    Collection Time: 09/02/20  2:07 PM   Result Value Ref Range    Sodium 140 136 - 145 mmol/L    Potassium 3 8 3 5 - 5 3 mmol/L    Chloride 107 100 - 108 mmol/L    CO2 29 21 - 32 mmol/L    ANION GAP 4 4 - 13 mmol/L    BUN 17 5 - 25 mg/dL    Creatinine 0 82 0 60 - 1 30 mg/dL    Glucose 75 65 - 140 mg/dL    Calcium 8 4 8 3 - 10 1 mg/dL    AST 45 5 - 45 U/L    ALT 72 12 - 78 U/L    Alkaline Phosphatase 24 (L) 46 - 116 U/L    Total Protein 6 8 6 4 - 8 2 g/dL    Albumin 3 5 3 5 - 5 0 g/dL    Total Bilirubin 0 52 0 20 - 1 00 mg/dL    eGFR 91 ml/min/1 73sq m   TSH    Collection Time: 09/02/20  2:07 PM   Result Value Ref Range    TSH 3RD GENERATON 1 664 0 358 - 3 740 uIU/mL   Magnesium    Collection Time: 09/02/20  2:07 PM   Result Value Ref Range    Magnesium 1 9 1 6 - 2 6 mg/dL   ECG 12 lead    Collection Time: 09/02/20  6:09 PM   Result Value Ref Range    Ventricular Rate 80 BPM    Atrial Rate 80 BPM    VA Interval 126 ms    QRSD Interval 72 ms    QT Interval 388 ms    QTC Interval 447 ms    P Axis 80 degrees    QRS Axis 89 degrees    T Wave Axis 61 degrees   CBC (With Platelets)    Collection Time: 09/03/20  6:05 AM   Result Value Ref Range    WBC 5 44 4 31 - 10 16 Thousand/uL    RBC 3 98 3 81 - 5 12 Million/uL    Hemoglobin 12 3 11 5 - 15 4 g/dL    Hematocrit 38 2 34 8 - 46 1 %    MCV 96 82 - 98 fL    MCH 30 9 26 8 - 34 3 pg    MCHC 32 2 31 4 - 37 4 g/dL    RDW 12 8 11 6 - 15 1 %    Platelets 092 732 - 368 Thousands/uL    MPV 10 4 8 9 - 12 7 fL   Comprehensive metabolic panel    Collection Time: 09/03/20  6:05 AM   Result Value Ref Range    Sodium 141 136 - 145 mmol/L    Potassium 3 9 3 5 - 5 3 mmol/L    Chloride 108 100 - 108 mmol/L    CO2 24 21 - 32 mmol/L    ANION GAP 9 4 - 13 mmol/L    BUN 19 5 - 25 mg/dL    Creatinine 0 82 0 60 - 1 30 mg/dL    Glucose 119 65 - 140 mg/dL    Glucose, Fasting 119 (H) 65 - 99 mg/dL    Calcium 7 8 (L) 8 3 - 10 1 mg/dL    AST 32 5 - 45 U/L    ALT 62 12 - 78 U/L    Alkaline Phosphatase 31 (L) 46 - 116 U/L    Total Protein 6 2 (L) 6 4 - 8 2 g/dL    Albumin 3 1 (L) 3 5 - 5 0 g/dL    Total Bilirubin 0 25 0 20 - 1 00 mg/dL    eGFR 91 ml/min/1 73sq m   CK (with reflex to MB)    Collection Time: 09/03/20  6:05 AM   Result Value Ref Range    Total CK 83 26 - 192 U/L       Imaging Studies: I have personally reviewed pertinent reports  and I have personally reviewed pertinent films in PACS  CTH- No acute intracranial abnormality

## 2020-09-03 NOTE — ASSESSMENT & PLAN NOTE
- Follows with GI team as an outpatient and is planned to start treatment     - LFTs normal on admission    -ammonia within normal limits

## 2020-09-03 NOTE — ASSESSMENT & PLAN NOTE
· Rheumatoid arthritis scheduled to see Rheumatology later this month  · Does have chronic pain on pregabalin    Reviewed on   · Given whole body pain with spasms, is CK was checked and is normal

## 2020-09-04 VITALS
SYSTOLIC BLOOD PRESSURE: 134 MMHG | RESPIRATION RATE: 18 BRPM | WEIGHT: 125.66 LBS | BODY MASS INDEX: 21.57 KG/M2 | HEART RATE: 86 BPM | TEMPERATURE: 97.2 F | DIASTOLIC BLOOD PRESSURE: 87 MMHG | OXYGEN SATURATION: 98 %

## 2020-09-04 PROBLEM — F19.10 IV DRUG ABUSE (HCC): Status: ACTIVE | Noted: 2020-09-04

## 2020-09-04 LAB
CARDIOLIPIN IGA SER IA-ACNC: <9 APL U/ML (ref 0–11)
CARDIOLIPIN IGG SER IA-ACNC: <9 GPL U/ML (ref 0–14)
CARDIOLIPIN IGM SER IA-ACNC: <9 MPL U/ML (ref 0–12)
FERRITIN SERPL-MCNC: 12 NG/ML (ref 8–388)
IRON SATN MFR SERPL: 12 %
IRON SERPL-MCNC: 50 UG/DL (ref 50–170)
PTH-INTACT SERPL-MCNC: 42.7 PG/ML (ref 18.4–80.1)
TIBC SERPL-MCNC: 417 UG/DL (ref 250–450)

## 2020-09-04 PROCEDURE — 83540 ASSAY OF IRON: CPT | Performed by: PSYCHIATRY & NEUROLOGY

## 2020-09-04 PROCEDURE — 86235 NUCLEAR ANTIGEN ANTIBODY: CPT | Performed by: PSYCHIATRY & NEUROLOGY

## 2020-09-04 PROCEDURE — 83970 ASSAY OF PARATHORMONE: CPT | Performed by: PSYCHIATRY & NEUROLOGY

## 2020-09-04 PROCEDURE — 82390 ASSAY OF CERULOPLASMIN: CPT | Performed by: PSYCHIATRY & NEUROLOGY

## 2020-09-04 PROCEDURE — 83550 IRON BINDING TEST: CPT | Performed by: PSYCHIATRY & NEUROLOGY

## 2020-09-04 PROCEDURE — 86255 FLUORESCENT ANTIBODY SCREEN: CPT | Performed by: PSYCHIATRY & NEUROLOGY

## 2020-09-04 PROCEDURE — 83516 IMMUNOASSAY NONANTIBODY: CPT | Performed by: PSYCHIATRY & NEUROLOGY

## 2020-09-04 PROCEDURE — 82728 ASSAY OF FERRITIN: CPT | Performed by: PSYCHIATRY & NEUROLOGY

## 2020-09-04 PROCEDURE — 82164 ANGIOTENSIN I ENZYME TEST: CPT | Performed by: PSYCHIATRY & NEUROLOGY

## 2020-09-04 PROCEDURE — 99217 PR OBSERVATION CARE DISCHARGE MANAGEMENT: CPT | Performed by: PHYSICIAN ASSISTANT

## 2020-09-04 PROCEDURE — 99225 PR SBSQ OBSERVATION CARE/DAY 25 MINUTES: CPT | Performed by: PSYCHIATRY & NEUROLOGY

## 2020-09-04 PROCEDURE — 82784 ASSAY IGA/IGD/IGG/IGM EACH: CPT | Performed by: PSYCHIATRY & NEUROLOGY

## 2020-09-04 PROCEDURE — 82525 ASSAY OF COPPER: CPT | Performed by: PSYCHIATRY & NEUROLOGY

## 2020-09-04 RX ORDER — LIDOCAINE 50 MG/G
1 PATCH TOPICAL DAILY
Status: DISCONTINUED | OUTPATIENT
Start: 2020-09-04 | End: 2020-09-04 | Stop reason: HOSPADM

## 2020-09-04 RX ADMIN — ALBUTEROL SULFATE 2 PUFF: 90 AEROSOL, METERED RESPIRATORY (INHALATION) at 01:35

## 2020-09-04 RX ADMIN — IBUPROFEN 600 MG: 600 TABLET ORAL at 00:30

## 2020-09-04 RX ADMIN — TIZANIDINE 4 MG: 4 TABLET ORAL at 09:04

## 2020-09-04 RX ADMIN — NICOTINE 1 PATCH: 14 PATCH TRANSDERMAL at 09:07

## 2020-09-04 RX ADMIN — MORPHINE SULFATE 2 MG: 2 INJECTION, SOLUTION INTRAMUSCULAR; INTRAVENOUS at 09:13

## 2020-09-04 RX ADMIN — MORPHINE SULFATE 2 MG: 2 INJECTION, SOLUTION INTRAMUSCULAR; INTRAVENOUS at 03:10

## 2020-09-04 RX ADMIN — IBUPROFEN 600 MG: 600 TABLET ORAL at 14:08

## 2020-09-04 RX ADMIN — OXYCODONE HYDROCHLORIDE 5 MG: 5 TABLET ORAL at 01:31

## 2020-09-04 RX ADMIN — OXYCODONE HYDROCHLORIDE 5 MG: 5 TABLET ORAL at 11:33

## 2020-09-04 RX ADMIN — PANTOPRAZOLE SODIUM 40 MG: 40 TABLET, DELAYED RELEASE ORAL at 06:38

## 2020-09-04 RX ADMIN — LIDOCAINE 1 PATCH: 50 PATCH CUTANEOUS at 11:32

## 2020-09-04 RX ADMIN — OXYCODONE HYDROCHLORIDE 5 MG: 5 TABLET ORAL at 06:38

## 2020-09-04 RX ADMIN — PREGABALIN 75 MG: 25 CAPSULE ORAL at 09:07

## 2020-09-04 RX ADMIN — BUDESONIDE AND FORMOTEROL FUMARATE DIHYDRATE 2 PUFF: 160; 4.5 AEROSOL RESPIRATORY (INHALATION) at 09:08

## 2020-09-04 NOTE — PLAN OF CARE
Problem: Potential for Falls  Goal: Patient will remain free of falls  Description: INTERVENTIONS:  - Assess patient frequently for physical needs  -  Identify cognitive and physical deficits and behaviors that affect risk of falls    -  [place] fall precautions as indicated by assessment   - Educate patient/family on patient safety including physical limitations  - Instruct patient to call for assistance with activity based on assessment  - Modify environment to reduce risk of injury  - Consider OT/PT consult to assist with strengthening/mobility  Outcome: Progressing

## 2020-09-04 NOTE — DISCHARGE SUMMARY
[facility]  Discharge- Bailey Javed 1980, 47 y o  female MRN: [id number]    Unit/Bed#: E4 -01 Encounter: [id number]    Primary Care Provider: MARSHA Hillman   Date and time admitted to hospital: 9/2/2020  5:15 PM        * Tremors of nervous system  Assessment & Plan  · Tremors of nervous system  Patient reports episodes happen 3-4 times yearly  Usually spontaneously resolves but this time happened at work  · Patient denies any increased anxiety  No evidence of electrolyte disturbances  TSH within limits  · Seen by Neurology while inpatient  · MRI C-spine with no cord signal abnormality, minimal disc bulges at C4-5 and 5-6, no significant canal or foraminal stenosis  · Heavy metal screen pending  Ammonia normal   CK normal   Vitamin-B and folate checked  Cardiolipin antibody pending, cryoglobulin ordered  · Patient to follow-up with movement Clinic outpatient      Tobacco abuse  Assessment & Plan  · Nicotine replacement patch  · Tobacco cessation education    [facility] )  Assessment & Plan  · Rheumatoid arthritis scheduled to see Rheumatology later this month  · Continue pregabalin      Hepatitis C  Assessment & Plan  · History of hepatitis-C    Dyspepsia  Assessment & Plan  · GERD continue PPI    Moderate persistent asthma without complication  Assessment & Plan  · Asthma without exacerbation  Continue symbicort singulair and loratadine    Hypothyroidism  Assessment & Plan  · History of hypothyroidism but stop taking medications    TSH within limits          Discharging Physician / Practitioner: Ivan Sebastian PA-C  PCP: Juan Dunbar, [address] [place] [address]  Admission Date:   Admission Orders (From admission, onward)     Ordered        09/02/20 1824  Place in Observation  Once                   Discharge Date: 09/04/20    Resolved Problems  Date Reviewed: 9/4/2020    None          Consultations During Hospital Stay:  · Neurology    Procedures Performed:   · CT head-no acute intracranial abnormalities  · MRI cervical spine-no cord signal abnormality home minimal disc bulge at C4-C5 and C5-C6, no significant canal or foraminal stenosis  · MRA brain-unremarkable MRI    Significant Findings / Test Results:   · See above    Incidental Findings:   · See above     Test Results Pending at Discharge (will require follow up):   · Heavy metal screen, lupus anticoagulant, beta 2 glycoprotein antibody, cardiolipin antibody, celiac disease antibody, Sjogren's antibody, angiotensin-converting enzyme, copper, ceruloplasmin and cryoglobulin     Outpatient Tests Requested:  · Outpatient follow-up with Neurology    Complications:  None    Reason for Admission:  Full body tremors    Hospital Course:     Hayde John is a 47 y o  female patient who originally presented to the hospital on 9/2/2020 due to tremors  Patient initially presented to the ED with complaint of whole body tremors  Patient was feeling shocks throughout her body causing involuntary movements and spasms  Patient was seen in consultation by Neurology  MRI C-spine and brain performed without acute abnormalities  Pain was controlled with analgesics p r n     Multiple labs pending at discharge, will follow-up in neurology clinic in 1 week  Please see above list of diagnoses and related plan for additional information  Condition at Discharge: stable     Discharge Day Visit / Exam:     Subjective:  Patient notes she is feeling better today  Still having some tremors but notes these are much improved from yesterday  Notes improvement in her pain rating at approximately a 5/10  Ready to go home  Vitals: Blood Pressure: 134/87 (09/04/20 0735)  Pulse: 86 (09/04/20 0735)  Temperature: (!) 97 2 °F (36 2 °C) (09/04/20 0735)  Temp Source: Temporal (09/04/20 0735)  Respirations: 18 (09/04/20 0735)  Weight - Scale: 57 kg (125 lb 10 6 oz) (09/02/20 1658)  SpO2: 98 % (09/04/20 0735)  Exam:   Physical Exam  Vitals signs reviewed  Constitutional:       General: She is not in acute distress  HENT:      Head: Normocephalic and atraumatic  Eyes:      General: No scleral icterus  Conjunctiva/sclera: Conjunctivae normal    Neck:      Musculoskeletal: Neck supple  Cardiovascular:      Rate and Rhythm: Normal rate and regular rhythm  Heart sounds: No murmur  Pulmonary:      Effort: Pulmonary effort is normal       Breath sounds: Normal breath sounds  Abdominal:      General: Bowel sounds are normal  There is no distension  Palpations: Abdomen is soft  Tenderness: There is no abdominal tenderness  Musculoskeletal:      Right lower leg: No edema  Left lower leg: No edema  Skin:     General: Skin is warm and dry  Neurological:      General: No focal deficit present  Mental Status: She is alert and oriented to person, place, and time  Psychiatric:         Mood and Affect: Mood normal          Behavior: Behavior normal          Discussion with Family:  None    Discharge instructions/Information to patient and family:   See after visit summary for information provided to patient and family  Provisions for Follow-Up Care:  See after visit summary for information related to follow-up care and any pertinent home health orders  Disposition:     Home    For Discharges to [facility] SNF:   · Not Applicable to this Patient - Not Applicable to this Patient    Planned Readmission: none     Discharge Statement:  I spent 35 minutes discharging the patient  This time was spent on the day of discharge  I had direct contact with the patient on the day of discharge  Greater than 50% of the total time was spent examining patient, answering all patient questions, arranging and discussing plan of care with patient as well as directly providing post-discharge instructions  Additional time then spent on discharge activities      Discharge Medications:  See after visit summary for reconciled discharge medications provided to patient and family        ** Please Note: This note has been constructed using a voice recognition system **

## 2020-09-04 NOTE — PROGRESS NOTES
Progress Note - Neurology   Maliha Thomas 47 y o  female MRN: [id number]  Unit/Bed#: E4 -01 Encounter: [id number]      Assessment/Plan     * Tremors of nervous system  Assessment & Plan   - Unclear etiology  Patient notes intermittent tremors worse with action and description of symptoms sound more consistent with myoclonus  Do not suspect seizures; patient denies LOC, bowel/bladder incontinence and notes that all 4 limbs are generally involved making a focal seizure unlikely    -onset was approximately 7 years ago after a gastrointestinal illness      - MRI brain and C-spine essentially unremarkable studies    - B12 and folate within normal limits  -pending labs include celiac antibodies, iron panel, Sjogren's antibodies, ACE, serum copper, ceruloplasmin, lupus anticoagulant, beta 2 glycoprotein, cardiolipin antibody  Unfortunately, patient left the hospital before cryoglobulins could be drawn  Patient to follow with the movement disorder clinic upon discharge  Appointment requested  History of IV drug abuse  Assessment & Plan  History of heroin use; 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  Tobacco abuse  Assessment & Plan   - Encourage tobacco cessation  Positive RF  Assessment & Plan   - Patient with positive RF screen and RF quantification 10 IU/mL  - Planning to see rheumatology as an outpatient  She does complain of diffuse body pain and is on pregabalin 75 mg QD as an outpatient  She was previously taking Gabapentin but notes that this was discontinued when she started pregabalin  Hepatitis C  Assessment & Plan   - Follows with GI team as an outpatient and is planned to start treatment     - LFTs normal on admission    -ammonia within normal limits  Hypothyroidism  Assessment & Plan   - TSH 1 664 on admission  Maliha Thomas will need follow up in in 3 months with movement disorder and memory attending  She will not require outpatient neurological testing    Labs are pending  Subjective:   Patient states she is at the tail end of 1 of her abnormal movement attacks, and that the movements have calmed down somewhat  She continues to have generalized twitchiness  No loss of awareness or consciousness  She is requesting morphine for back pain  Per nursing, received 2 doses of morphine and OxyContin this morning  Otherwise no other complaints  States she needs to leave soon as she has a hair and nail appointments  ROS: 12 point review of systems performed and negative except as indicated above  Vitals: Blood pressure 134/87, pulse 86, temperature (!) 97 2 °F (36 2 °C), temperature source Temporal, resp  rate 18, weight 57 kg (125 lb 10 6 oz), SpO2 98 %, not currently breastfeeding  ,Body mass index is 21 57 kg/m²  Physical Exam:   General:  Patient is well-developed, well-nourished, and in no acute distress  HEENT:  Head normocephalic  Eyes anicteric  Cardiovascular:  With regular rhythm  Lungs:  Normal effort  Nonlabored breathing  Extremities:  With no significant edema  Skin: No rashes  Neuro:  Patient is alert and pleasantly interactive  No symbolic language difficulty or dysarthria  EOMs intact without nystagmus  Face appears symmetric  Accurate with finger-to-nose maneuvers bilaterally  Patient appears generally tremulous, with fast movements of her head and shoulders  Reflexes 1+ bilateral brachioradialis, 1 bilateral biceps, 2 bilateral triceps, 3+ bilateral knees, 2+ bilateral ankles  Toe responses were downgoing bilaterally      Lab, Imaging and other studies:   CBC:   Results from last 7 days   Lab Units 09/03/20  0605 09/02/20  1407   WBC Thousand/uL 5 44 6 82   RBC Million/uL 3 98 4 35   HEMOGLOBIN g/dL 12 3 13 4   HEMATOCRIT % 38 2 41 0   MCV fL 96 94   PLATELETS Thousands/uL 176 190   , BMP/CMP:   Results from last 7 days   Lab Units 09/03/20  0605 09/02/20  1407   SODIUM mmol/L 141 140   POTASSIUM mmol/L 3 9 3 8   CHLORIDE mmol/L 108 107   CO2 mmol/L 24 29   BUN mg/dL 19 17   CREATININE mg/dL 0 82 0 82   CALCIUM mg/dL 7 8* 8 4   AST U/L 32 45   ALT U/L 62 72   ALK PHOS U/L 31* 24*   EGFR ml/min/1 73sq m 91 91   , Ammonia:   Results from last 7 days   Lab Units 09/03/20  1034   AMMONIA umol/L 33     VTE Prophylaxis: Sequential compression device (Venodyne)     Counseling / Coordination of Care  Total time spent today 25 minutes  Greater than 50% of total time was spent with the patient and / or family counseling and / or coordination of care  A description of the counseling / coordination of care: Discussed plan of care with attending neurologist and plan to have patient follow with movement Disorder specialist   Discussed plan with primary team   Discussed results of patient's MRIs with her

## 2020-09-04 NOTE — CASE MANAGEMENT
Cm reviewed pt care coordination rounds with JAY Flowers  Pt is a tentative d/c today to home with OPPT  Cm will f/u for final medical clearance and dcp needs

## 2020-09-04 NOTE — ASSESSMENT & PLAN NOTE
· Tremors of nervous system  Patient reports episodes happen 3-4 times yearly  Usually spontaneously resolves but this time happened at work  · Patient denies any increased anxiety  No evidence of electrolyte disturbances  TSH within limits  · Seen by Neurology while inpatient  · MRI C-spine with no cord signal abnormality, minimal disc bulges at C4-5 and 5-6, no significant canal or foraminal stenosis  · Heavy metal screen pending  Ammonia normal   CK normal   Vitamin-B and folate checked    Cardiolipin antibody pending, cryoglobulin ordered  · Patient to follow-up with movement Clinic outpatient

## 2020-09-04 NOTE — NURSING NOTE
Patient with concerns regarding pain management at this time  Prior to meeting with patient at the bedside I spoke with primary nurse, the patient's attending and PA overseeing her care  Patient with chronic musculoskeletal pain, informed by [facility] team goal is to treat with tordal or ibuprofen with morphine for breakthrough pain  I then sat with the patient one on one and reviewed what is currently ordered and how it may be used to assist with her pain control  Patient requesting dose of morphine at this time as well as heating pad for back  This was escalated to her primary nurse that will now be assuming her care  Patient verbalized understanding of pain management plan at this time

## 2020-09-04 NOTE — NURSING NOTE
Patient's IV removed  AVS reviewed with patient  Medications from pharmacy returned to patient  Medications that were taken from med box that patient had in purse were returned to hospital pharmacy  Cigarettes and Lighters returned as well   Patient taken to ER lobby by wheelchair with PCA to catch Lexis Manley

## 2020-09-04 NOTE — PROGRESS NOTES
Sat with patient this morning and reviewed list of pain medications she was receiving as well as times of last doses  Patient was given oxy this morning at 5762 and now asking for more pain medication/ morphine  Stated Toradol does not work  Can up with pain management plan including 1 dose morphine now with Ibuprofin in 30 minutes; At 1120 gave oxy instead  Patient states pain is at 5-6 where she is comfortable

## 2020-09-05 LAB
B2 GLYCOPROT1 IGA SER-ACNC: <9 GPI IGA UNITS (ref 0–25)
B2 GLYCOPROT1 IGG SER-ACNC: <9 GPI IGG UNITS (ref 0–20)
B2 GLYCOPROT1 IGM SER-ACNC: <9 GPI IGM UNITS (ref 0–32)
CERULOPLASMIN SERPL-MCNC: 19.1 MG/DL (ref 19–39)
ENA SS-A AB SER-ACNC: <0.2 AI (ref 0–0.9)
ENA SS-B AB SER-ACNC: <0.2 AI (ref 0–0.9)

## 2020-09-06 LAB
APTT SCREEN TO CONFIRM RATIO: 1.14 RATIO (ref 0–1.4)
CONFIRM APTT/NORMAL: 36.3 SEC (ref 0–55)
ENDOMYSIUM IGA SER QL: NEGATIVE
GLIADIN PEPTIDE IGA SER-ACNC: 4 UNITS (ref 0–19)
GLIADIN PEPTIDE IGG SER-ACNC: 1 UNITS (ref 0–19)
IGA SERPL-MCNC: 93 MG/DL (ref 87–352)
LA PPP-IMP: NORMAL
SCREEN APTT: 36.7 SEC (ref 0–51.9)
SCREEN DRVVT: 29.4 SEC (ref 0–47)
THROMBIN TIME: 21.1 SEC (ref 0–23)
TTG IGA SER-ACNC: <2 U/ML (ref 0–3)
TTG IGG SER-ACNC: <2 U/ML (ref 0–5)

## 2020-09-08 LAB — ACE SERPL-CCNC: 24 U/L (ref 14–82)

## 2020-09-09 ENCOUNTER — TELEPHONE (OUTPATIENT)
Dept: NEUROLOGY | Facility: CLINIC | Age: 40
End: 2020-09-09

## 2020-09-10 LAB
ARSENIC BLD-MCNC: 6 UG/L (ref 2–23)
CADMIUM BLD-MCNC: 1.1 UG/L (ref 0–1.2)
COPPER SERPL-MCNC: 88 UG/DL (ref 72–166)
LEAD BLD-MCNC: <1 UG/DL (ref 0–4)
MERCURY BLD-MCNC: <1 UG/L (ref 0–14.9)

## 2020-09-11 ENCOUNTER — TELEPHONE (OUTPATIENT)
Dept: NEUROLOGY | Facility: CLINIC | Age: 40
End: 2020-09-11

## 2020-09-11 NOTE — TELEPHONE ENCOUNTER
----- Message from Shahla Lindsey PA-C sent at 9/4/2020  1:48 PM EDT -----  Regarding: Hospital followup  Diagnosis/Reason for follow-up: abnormal movements  Subspecialty for follow-up: movement disorder - seen by Dr Weeks Apo over Teams shailesh  Existing neurologist: n/a  Tests/Labs/Imaging ordered: see Epic for pending labs  Attending/AP: attending  Recommended timing for follow-up: 3 mo  Notes: pt states you can leave voicemail if she does not answer  Thanks!

## 2020-09-16 DIAGNOSIS — B00.1 RECURRENT HERPES LABIALIS: ICD-10-CM

## 2020-09-16 RX ORDER — VALACYCLOVIR HYDROCHLORIDE 500 MG/1
500 TABLET, FILM COATED ORAL DAILY
Qty: 90 TABLET | Refills: 0 | Status: SHIPPED | OUTPATIENT
Start: 2020-09-16 | End: 2021-04-20 | Stop reason: SDUPTHER

## 2020-09-18 DIAGNOSIS — M79.10 MYALGIA: ICD-10-CM

## 2020-09-18 RX ORDER — CYCLOBENZAPRINE HCL 5 MG
5 TABLET ORAL 2 TIMES DAILY
Qty: 30 TABLET | Refills: 0 | Status: SHIPPED | OUTPATIENT
Start: 2020-09-18 | End: 2020-09-21 | Stop reason: ALTCHOICE

## 2020-09-21 ENCOUNTER — HOSPITAL ENCOUNTER (OUTPATIENT)
Dept: RADIOLOGY | Facility: HOSPITAL | Age: 40
Discharge: HOME | End: 2020-09-21
Attending: INTERNAL MEDICINE
Payer: COMMERCIAL

## 2020-09-21 ENCOUNTER — APPOINTMENT (OUTPATIENT)
Dept: LAB | Facility: MEDICAL CENTER | Age: 40
End: 2020-09-21
Payer: COMMERCIAL

## 2020-09-21 ENCOUNTER — APPOINTMENT (OUTPATIENT)
Dept: RADIOLOGY | Facility: MEDICAL CENTER | Age: 40
End: 2020-09-21
Payer: COMMERCIAL

## 2020-09-21 ENCOUNTER — OFFICE VISIT (OUTPATIENT)
Dept: RHEUMATOLOGY | Facility: CLINIC | Age: 40
End: 2020-09-21
Payer: COMMERCIAL

## 2020-09-21 VITALS
WEIGHT: 125 LBS | BODY MASS INDEX: 22.15 KG/M2 | TEMPERATURE: 97.8 F | SYSTOLIC BLOOD PRESSURE: 120 MMHG | HEIGHT: 63 IN | DIASTOLIC BLOOD PRESSURE: 80 MMHG

## 2020-09-21 DIAGNOSIS — R76.8 RHEUMATOID FACTOR POSITIVE: ICD-10-CM

## 2020-09-21 DIAGNOSIS — M19.90 ARTHRITIS: Primary | ICD-10-CM

## 2020-09-21 DIAGNOSIS — M79.10 MYALGIA: ICD-10-CM

## 2020-09-21 DIAGNOSIS — M19.90 ARTHRITIS: ICD-10-CM

## 2020-09-21 DIAGNOSIS — R21 RASH: ICD-10-CM

## 2020-09-21 LAB
CK MB SERPL-MCNC: 1.6 % (ref 0–2.5)
CK MB SERPL-MCNC: 2.2 NG/ML (ref 0–5)
CK SERPL-CCNC: 140 U/L (ref 26–192)
ERYTHROCYTE [SEDIMENTATION RATE] IN BLOOD: 12 MM/HOUR (ref 0–19)

## 2020-09-21 PROCEDURE — 36415 COLL VENOUS BLD VENIPUNCTURE: CPT | Performed by: INTERNAL MEDICINE

## 2020-09-21 PROCEDURE — 82550 ASSAY OF CK (CPK): CPT | Performed by: INTERNAL MEDICINE

## 2020-09-21 PROCEDURE — 82085 ASSAY OF ALDOLASE: CPT | Performed by: INTERNAL MEDICINE

## 2020-09-21 PROCEDURE — 81374 HLA I TYPING 1 ANTIGEN LR: CPT | Performed by: INTERNAL MEDICINE

## 2020-09-21 PROCEDURE — 85652 RBC SED RATE AUTOMATED: CPT | Performed by: INTERNAL MEDICINE

## 2020-09-21 PROCEDURE — 82553 CREATINE MB FRACTION: CPT | Performed by: INTERNAL MEDICINE

## 2020-09-21 PROCEDURE — 72200 X-RAY EXAM SI JOINTS: CPT

## 2020-09-21 PROCEDURE — 99244 OFF/OP CNSLTJ NEW/EST MOD 40: CPT | Performed by: INTERNAL MEDICINE

## 2020-09-21 PROCEDURE — 86200 CCP ANTIBODY: CPT | Performed by: INTERNAL MEDICINE

## 2020-09-21 RX ORDER — CELECOXIB 100 MG/1
100 CAPSULE ORAL 2 TIMES DAILY
Qty: 60 CAPSULE | Refills: 6 | Status: SHIPPED | OUTPATIENT
Start: 2020-09-21 | End: 2022-07-13

## 2020-09-21 RX ORDER — METHOCARBAMOL 500 MG/1
500 TABLET, FILM COATED ORAL 2 TIMES DAILY
Qty: 60 TABLET | Refills: 6 | Status: SHIPPED | OUTPATIENT
Start: 2020-09-21 | End: 2020-10-19

## 2020-09-21 NOTE — PATIENT INSTRUCTIONS
Do labs  Do low back x-rays  Can start celecoxib twice a day with meals for joint pain  Can stop cyclobenzaprine, and start methocarbamol twice a day as needed for muscle pain/stiffness    Return to clinic in 6 months    Arthritis   AMBULATORY CARE:   Arthritis  is a disease that causes inflammation in one or more joints  There are many types of arthritis, such as osteoarthritis, rheumatoid arthritis, and septic arthritis  Some types cause inflammation in the joints  Other types wear away the cartilage between joints  This makes the bones of the joint rub together when you move the joint  Your symptoms may be constant, or symptoms may come and go  Arthritis often gets worse over time and can cause permanent joint damage  Common signs and symptoms of arthritis:   · Pain, swelling, or stiffness in the joint    · Limited range of motion in the joint    · Warmth or redness over the joint    · Tenderness when you touch the joint    · Stiff joints in the morning that loosen with movement    · A creaking or grinding sound when you move the joint    · Fever  Seek care immediately if:   · You have a fever and severe joint pain or swelling  · You cannot move the affected joint  · You have severe joint pain you cannot tolerate  Contact your healthcare provider if:   · Your pain or swelling does not get better with treatment  · You have questions or concerns about your condition or care  Treatment  will depend on the type of arthritis you have and if it is severe  You may need any of the following:  · Acetaminophen  decreases pain and fever  It is available without a doctor's order  Ask how much to take and how often to take it  Follow directions  Acetaminophen can cause liver damage if not taken correctly  · NSAIDs , such as ibuprofen, help decrease swelling, pain, and fever  This medicine is available with or without a doctor's order  NSAIDs can cause stomach bleeding or kidney problems in certain people   If you take blood thinner medicine, always ask your healthcare provider if NSAIDs are safe for you  Always read the medicine label and follow directions  · Steroid medicine  helps reduce swelling and pain  · Surgery  may be needed to repair or replace a damaged joint  Manage arthritis:   · Rest your painful joint so it can heal   Your healthcare provider may recommend crutches or a walker if the affected joint is in a leg  · Apply ice or heat to the joint  Both can help decrease swelling and pain  Ice may also help prevent tissue damage  Use an ice pack, or put crushed ice in a plastic bag  Cover it with a towel and place it on your joint for 15 to 20 minutes every hour or as directed  You can apply heat for 20 minutes every 2 hours  Heat treatment includes hot packs or heat lamps  · Elevate your joint  Elevation helps reduce swelling and pain  Raise your joint above the level of your heart as often as you can  Prop your painful joint on pillows to keep it above your heart comfortably  · Go to physical or occupational therapy as directed  A physical therapist can teach you exercises to improve flexibility and range of motion  You may also be shown non-weight-bearing exercises that are safe for your joints, such as swimming  Exercise can help keep your joints flexible and reduce pain  An occupational therapist can help you learn to do your daily activities when your joints are stiff or sore  · Maintain a healthy weight  Extra weight puts increased pressure on your joints  Ask your healthcare provider what you should weigh  If you need to lose weight, he can help you create a weight loss program  Weight loss can help reduce pain and increase your ability to do your activities  The amount of exercise you do may vary each day, depending on your symptoms  · Wear flat or low-heeled shoes  This will help decrease pain and reduce pressure on your ankle, knee, and hip joints      · Use support devices as directed  You may be given splints to wear on your hands to help your joints rest and to decrease inflammation  While you sleep, use a pillow that is firm enough to support your neck and head  Other equipment  that may help you move and prevent falls:  · Orthotic shoes or insoles  help support your feet when you walk  · Crutches, a cane, or a walker  may help decrease your risk for falling  They also decrease stress on affected joints  · Devices to prevent falls  include raised toilet seats and bathtub bars to help you get up from sitting  Handrails can be placed in areas where you need balance and support  Follow up with your healthcare provider or rheumatologist as directed:  Write down your questions so you remember to ask them during your visits  © 2017 [employer] Information is for End User's use only and may not be sold, redistributed or otherwise used for commercial purposes  All illustrations and images included in CareNotes® are the copyrighted property of [employer]  or Abhinav Nagel  The above information is an  only  It is not intended as medical advice for individual conditions or treatments  Talk to your doctor, nurse or pharmacist before following any medical regimen to see if it is safe and effective for you

## 2020-09-21 NOTE — PROGRESS NOTES
Assessment and Plan: Jess Rogers is a 47 y o  female who presents as a Rheumatology consult referred by her PCP MARSHA Pendleton for evaluation of possible inflammatory arthritis  I let patient know that a RF of 10 should actually be reported as negative since it is <14  Patient admits to achiness all over; has been having pain like this for 7-8 years  Further inflammatory arthritis work-up ordered below, which returned unremarkable, including SI joint x-rays and HLA-B27 to evaluate for a possible seronegative spondyloarthropathy given patient's admission to right Achilles swelling and rash developing around her fingernails  Her ESR/CRP inflammatory markers are also normal, and extensive autoimmune work-up in the past has been unremarkable as well    For now, prescribed celecoxib 100mg po with meals to address her joint pain, and since cyclobenzaprine doesn't seem to be helping her muscle pain much, prescribed methocarbamol 500mg po bid prn for muscle pain/stiffness  Dermatology referral placed to evaluate for possible psoriasis involving patient's fingers  Plan:  Diagnoses and all orders for this visit:    Arthritis  -     Sedimentation rate, automated  -     Cyclic citrul peptide antibody, IgG  -     HLA-B27 antigen  -     XR sacroiliac joints < 3 views; Future  -     celecoxib (CeleBREX) 100 mg capsule; Take 1 capsule (100 mg total) by mouth 2 (two) times a day    Rheumatoid factor positive  -     Ambulatory referral to Rheumatology    Myalgia  -     CK  -     Aldolase  -     methocarbamol (ROBAXIN) 500 mg tablet; Take 1 tablet (500 mg total) by mouth 2 (two) times a day  -     CKMB    Rash  -     Ambulatory referral to Dermatology; Future    Follow-up plan: Return to clinic in 6 months      HPI  Jess Rogers is a 47 y o   female who presents as a Rheumatology consult referred by her PCP MARSHA Pendleton for evaluation of possible inflammatory arthritis   For the past 7-8 years, patient has been having pain all over; at times, gets so stiff that it feels like she's had a whole body workout, worse in the morning and with inactivity  Starts with muscles aching; body gets achy  Would take ibuprofen 800-1600mg a day; the ibuprofen stopped working a year ago, was switched to gabapentin 300mg three times a day; a couple of weeks ago, was switched to pregabalin 75mg po bid; helps sciatica  Cyclobenzaprine helps with body aches and stiffness, but not much  Ibuprofen helped for a decade before it stopped working a year ago  Was tried on naproxen, but it upset stomach  Used to see physical therapy until 7-8 weeks ago; hands swell a lot and knees swell constantly  Hand swelling gets better with activity  She also has been getting a rash on thumbs/under fingernails which started 4 months ago  Prednisone helps rash but not pain  Has been getting involuntary tremors/spasms  Most of her pain is in her wrists, elbows, knees, and ankles  Gets right Achilles area swelling  Review of Systems  Review of Systems   Constitutional: Negative for chills, fatigue, fever and unexpected weight change  HENT: Positive for sinus pressure and sinus pain  Negative for mouth sores and trouble swallowing  Eyes: Positive for pain  Negative for visual disturbance  Respiratory: Positive for cough, shortness of breath and wheezing  Cardiovascular: Positive for chest pain  Negative for leg swelling  Gastrointestinal: Positive for abdominal pain  Negative for blood in stool, constipation, diarrhea and nausea  Indigestion/heartburn   Endocrine: Positive for heat intolerance  Genitourinary: Positive for frequency  Musculoskeletal: Positive for arthralgias, back pain, joint swelling, myalgias and neck pain  Skin: Positive for rash  Negative for color change  Neurological: Positive for dizziness, tremors, weakness and numbness  Hematological: Negative for adenopathy     Psychiatric/Behavioral: Positive for dysphoric mood  Negative for sleep disturbance         Allergies  Allergies   Allergen Reactions    Ativan [Lorazepam]     Coconut Oil      coconut    Nuts     Penicillins     Shellfish-Derived Products     Tegretol [Carbamazepine]        Home Medications    Current Outpatient Medications:     nystatin (MYCOSTATIN) 500,000 units/5 mL suspension, Apply 5 mL (500,000 Units total) to the mouth or throat 4 (four) times a day, Disp: 473 mL, Rfl: 1    SUMAtriptan (IMITREX) 50 mg tablet, Take 1 tablet (50 mg total) by mouth once as needed for migraine, Disp: 9 tablet, Rfl: 0    valACYclovir (VALTREX) 500 mg tablet, Take 1 tablet (500 mg total) by mouth daily for 90 doses, Disp: 90 tablet, Rfl: 0    varenicline (CHANTIX COLLIN) 0 5 MG X 11 & 1 MG X 42 tablet, Take one 0 5mg tab by mouth 1x daily for 3 days, then increase to one 0 5mg tab 2x daily for 3 days, then increase to one 1mg tab 2x daily (Patient not taking: Reported on 12/3/2020), Disp: 53 tablet, Rfl: 0    albuterol (PROVENTIL HFA,VENTOLIN HFA) 90 mcg/act inhaler, INHALE 1 PUFF EVERY 4 (FOUR) HOURS AS NEEDED FOR WHEEZING, Disp: 18 Inhaler, Rfl: 3    celecoxib (CeleBREX) 100 mg capsule, Take 1 capsule (100 mg total) by mouth 2 (two) times a day, Disp: 60 capsule, Rfl: 6    cyclobenzaprine (FLEXERIL) 10 mg tablet, Take 1 tablet (10 mg total) by mouth 3 (three) times a day as needed for muscle spasms, Disp: 90 tablet, Rfl: 0    escitalopram (LEXAPRO) 10 mg tablet, Take 1 tablet (10 mg total) by mouth daily Take 1/2 pill for first 7 days then take one pill daily, Disp: 30 tablet, Rfl: 0    ipratropium-albuterol (DUO-NEB) 0 5-2 5 mg/3 mL nebulizer solution, Take 1 vial (3 mL total) by nebulization 4 (four) times a day, Disp: 50 vial, Rfl: 3    loratadine (CLARITIN) 10 mg tablet, TAKE 1 TABLET (10 MG TOTAL) BY MOUTH DAILY (Patient taking differently: Take 10 mg by mouth daily as needed ), Disp: 30 tablet, Rfl: 5    montelukast (SINGULAIR) 10 mg tablet, TAKE 1 TABLET (10 MG TOTAL) BY MOUTH DAILY AT BEDTIME, Disp: 90 tablet, Rfl: 1    omeprazole (PriLOSEC) 40 MG capsule, TAKE 1 CAPSULE (40 MG TOTAL) BY MOUTH DAILY, Disp: 90 capsule, Rfl: 1    pregabalin (LYRICA) 75 mg capsule, Take 1 capsule (75 mg total) by mouth 2 (two) times a day, Disp: 60 capsule, Rfl: 0    Symbicort 160-4 5 MCG/ACT inhaler, INHALE 2 PUFFS 2 (TWO) TIMES A DAY RINSE MOUTH AFTER USE , Disp: 10 2 Inhaler, Rfl: 5    Past Medical History  Past Medical History:   Diagnosis Date    Anemia     Anxiety     Arthritis     Asthma     Coronary artery disease     Disease of thyroid gland     GERD (gastroesophageal reflux disease)     History of heroin use     herion last used 3 5 years     Hypertension     Hypothyroidism     Infectious viral hepatitis     Mitral valve regurgitation     Myocardial infarction (HCC)     Pneumonia     RA (rheumatoid arthritis) (HCC)     Tobacco abuse     Urinary tract infection        Past Surgical History   Past Surgical History:   Procedure Laterality Date    APPENDECTOMY      OVARIAN CYST REMOVAL         Family History    Family History   Problem Relation Age of Onset    COPD Mother     No Known Problems Father     Asthma Brother     No Known Problems Brother    maternal grandfather and his side - multiple sclerosis    Social History  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[place] [address] History     Substance and Sexual Activity   Alcohol Use No     Social History     Substance and Sexual Activity   Drug Use Not Currently    Comment: gel for arthrisis     Social History     Tobacco Use   Smoking Status Current Every Day Smoker    Packs/day: 0 50    Years: 25 00    Pack years: 12 50    Types: Cigarettes    Start date: 1995   Smokeless Tobacco Never Used   smokes 1 PPD of cigarettes    Objective:  Vitals:    09/21/20 1338   BP: 120/80   BP Location: Right arm   Patient Position: Sitting   Cuff Size: Standard   Temp: 97 8 °F (36 6 °C)   TempSrc: Temporal   Weight: 56 7 kg (125 lb)   Height: 5' 3" (1 6 m)       Physical Exam  Constitutional:       General: She is not in acute distress  Appearance: She is well-developed  HENT:      Head: Normocephalic and atraumatic  Eyes:      General: Lids are normal  No scleral icterus  Conjunctiva/sclera: Conjunctivae normal    Neck:      Musculoskeletal: Neck supple  No muscular tenderness  Thyroid: No thyromegaly  Cardiovascular:      Rate and Rhythm: Normal rate and regular rhythm  Heart sounds: S1 normal and S2 normal  No murmur  No friction rub  Pulmonary:      Effort: Pulmonary effort is normal  No tachypnea or respiratory distress  Breath sounds: Normal breath sounds  No wheezing, rhonchi or rales  Musculoskeletal:         General: Tenderness present  Comments: R wrist tenderness, L 5th MCP tenderness, L ankle tenderness, bilateral SI joint tenderness   Lymphadenopathy:      Head:      Right side of head: No submental or submandibular adenopathy  Left side of head: No submental or submandibular adenopathy  Cervical: No cervical adenopathy  Skin:     General: Skin is warm and dry  Findings: Rash present  Nails: There is no clubbing  Comments: Rash on thumbs and under fingernails bilaterally   Neurological:      Mental Status: She is alert  Sensory: No sensory deficit  Psychiatric:         Behavior: Behavior normal  Behavior is cooperative  Reviewed labs and imaging  Imaging:   Xr Knee 4+ Views Right Injury    Result Date: 9/2/2020  Narrative: RIGHT KNEE INDICATION:   s/p trauma  COMPARISON:  None VIEWS:  XR KNEE 4+ VW RIGHT INJURY Images: 4 FINDINGS: There is no acute fracture or dislocation  There is no joint effusion  No significant degenerative changes  No lytic or blastic osseous lesion  Soft tissues are unremarkable  Impression: No acute osseous abnormality   Workstation performed: [id number]       Mri Cervical Spine W Wo Contrast    Result Date: 9/4/2020  Narrative: MRI CERVICAL SPINE WITH AND WITHOUT CONTRAST INDICATION: Tremors, jerking movements  COMPARISON:  None  TECHNIQUE:  Sagittal T1, sagittal T2, sagittal inversion recovery, axial 2D merge and axial T2  Sagittal T1 and axial T1 postcontrast   IV Contrast:  5 mL of Gadobutrol injection (SINGLE-DOSE) IMAGE QUALITY:  Diagnostic  FINDINGS: ALIGNMENT:  There is straightening of normal cervical lordosis  No significant spondylolisthesis  MARROW SIGNAL:  Normal marrow signal is identified within the visualized bony structures  No discrete marrow lesion  CERVICAL AND VISUALIZED UPPER THORACIC CORD:  Normal signal within the visualized cord  PREVERTEBRAL AND PARASPINAL SOFT TISSUES: Left thyroid lobe 1 3 cm nodule is noted  Incidental discovery of one or more thyroid nodule(s) measuring less than 1 5 cm and without suspicious features is noted in this patient who is above 28years old; according to guidelines published in the February 2015 white paper on incidental thyroid nodules in the Journal of the [employer] of Radiology [facility]), no further evaluation is recommended  VISUALIZED POSTERIOR FOSSA:  The visualized posterior fossa demonstrates no abnormal signal  CERVICAL DISC SPACES: C2-C3:  Unremarkable C3-C4:  Unremarkable C4-C5:  Minimal disc bulge  No significant canal or foraminal stenosis  C5-C6:  Minimal disc bulge  No canal or foraminal stenosis C6-C7:  Unremarkable C7-T1:  Unremarkable UPPER THORACIC DISC SPACES:  Normal  POSTCONTRAST IMAGING:  Normal      Impression: 1  No cord signal abnormality  2   Minimal disc bulges at levels C4-5 and C5-6  No significant canal or foraminal stenosis   Workstation performed: [id number]      Labs:   Office Visit on 09/21/2020   Component Date Value Ref Range Status    Sed Rate 09/21/2020 12  0 - 19 mm/hour Final    Cyclic Citrullin Peptide Ab 09/21/2020 5  0 - 19 units Final                              Negative               <20 Weak positive      20 - 39                            Moderate positive  40 - 59                            Strong positive        >59    Total CK 09/21/2020 140  26 - 192 U/L Final    Aldolase 09/21/2020 5 1  3 3 - 10 3 U/L Final    HLA B27 09/21/2020 Negative   Final    HLA-B*27 Negative  B27 allele interpretation for all loci based on IMGT/HLA  database version 3 38  This test was developed and its performance characteristics  determined by [facility]   It has not been cleared or approved  by the Food and Drug Administration  [facility] CLIA ID Number [id number]  This test was performed using PCR (Polymerase Chain Reaction)/SSOP  (Sequence Specific Oligonucleotide Probes) technique  SBT (Sequence  Based Typing) and/or SSP (Sequence Specific Primers) may be used as  supplemental methods when necessary  Please contact [facility] Customer  Service at [phone] if you have any questions     Director of [facility] Laboratory   Dr Teodoro Alcantara, PhD    CK-MB Index 09/21/2020 1 6  0 0 - 2 5 % Final    CK-MB 09/21/2020 2 2  0 0 - 5 0 ng/mL Final   Admission on 09/02/2020, Discharged on 09/04/2020   Component Date Value Ref Range Status    Ventricular Rate 09/02/2020 80  BPM Final    Atrial Rate 09/02/2020 80  BPM Final    UT Interval 09/02/2020 126  ms Final    QRSD Interval 09/02/2020 72  ms Final    QT Interval 09/02/2020 388  ms Final    QTC Interval 09/02/2020 447  ms Final    P Axis 09/02/2020 80  degrees Final    QRS Axis 09/02/2020 89  degrees Final    T Wave [place] 09/02/2020 61  degrees Final    WBC 09/03/2020 5 44  4 31 - 10 16 Thousand/uL Final    RBC 09/03/2020 3 98  3 81 - 5 12 Million/uL Final    Hemoglobin 09/03/2020 12 3  11 5 - 15 4 g/dL Final    Hematocrit 09/03/2020 38 2  34 8 - 46 1 % Final    MCV 09/03/2020 96  82 - 98 fL Final    MCH 09/03/2020 30 9  26 8 - 34 3 pg Final    MCHC 09/03/2020 32 2  31 4 - 37 4 g/dL Final    RDW 09/03/2020 12 8  11 6 - 15 1 % Final    Platelets 09/03/2020 176  149 - 390 Thousands/uL Final    MPV 09/03/2020 10 4  8 9 - 12 7 fL Final    Sodium 09/03/2020 141  136 - 145 mmol/L Final    Potassium 09/03/2020 3 9  3 5 - 5 3 mmol/L Final    Chloride 09/03/2020 108  100 - 108 mmol/L Final    CO2 09/03/2020 24  21 - 32 mmol/L Final    ANION GAP 09/03/2020 9  4 - 13 mmol/L Final    BUN 09/03/2020 19  5 - 25 mg/dL Final    Creatinine 09/03/2020 0 82  0 60 - 1 30 mg/dL Final    Standardized to IDMS reference method    Glucose 09/03/2020 119  65 - 140 mg/dL Final    If the patient is fasting, the ADA then defines impaired fasting glucose as > 100 mg/dL and diabetes as > or equal to 123 mg/dL  Specimen collection should occur prior to Sulfasalazine administration due to the potential for falsely depressed results  Specimen collection should occur prior to Sulfapyridine administration due to the potential for falsely elevated results   Glucose, Fasting 09/03/2020 119* 65 - 99 mg/dL Final    Specimen collection should occur prior to Sulfasalazine administration due to the potential for falsely depressed results  Specimen collection should occur prior to Sulfapyridine administration due to the potential for falsely elevated results   Calcium 09/03/2020 7 8* 8 3 - 10 1 mg/dL Final    AST 09/03/2020 32  5 - 45 U/L Final    Specimen collection should occur prior to Sulfasalazine administration due to the potential for falsely depressed results   ALT 09/03/2020 62  12 - 78 U/L Final    Specimen collection should occur prior to Sulfasalazine administration due to the potential for falsely depressed results       Alkaline Phosphatase 09/03/2020 31* 46 - 116 U/L Final    Total Protein 09/03/2020 6 2* 6 4 - 8 2 g/dL Final    Albumin 09/03/2020 3 1* 3 5 - 5 0 g/dL Final    Total Bilirubin 09/03/2020 0 25  0 20 - 1 00 mg/dL Final    Use of this assay is not recommended for patients undergoing treatment with eltrombopag due to the potential for falsely elevated results   eGFR 09/03/2020 91  ml/min/1 73sq m Final    Total CK 09/03/2020 83  26 - 192 U/L Final    Ammonia 09/03/2020 33  11 - 35 umol/L Final    Specimen collection should occur prior to Sulfapyridine administration due to the potential for falsely depressed results   Arsenic 09/03/2020 6  2 - 23 ug/L Final                                    Detection Limit = 1    Cadmium 09/03/2020 1 1  0 0 - 1 2 ug/L Final                               Environmental Exposure:                              Nonsmokers      0 3 - 1 2                              Smokers         0 6 - 3 9                             Occupational Exposure:                              [employer] Cadmium Std      5 0                              [employer]                   5 0                                  Detection Limit = 0 5    Mercury 09/03/2020 <1 0  0 0 - 14 9 ug/L Final                            Environmental Exposure:  <15 0                          Occupational Exposure:                           [employer] - Inorganic Mercury: 15 0                                  Detection Limit =  1 0    Lead Blood 09/03/2020 <1  0 - 4 ug/dL Final    Testing performed by Inductively coupled plasma/Mass Spectrometry  Environmental Exposure:                             WHO Recommendation    <20                            Occupational Exposure:                             [employer] Lead Std          40                             [employer]                    30                                  Detection Limit =  1  This test was developed and its performance  characteristics determined by [facility]  It has not been  cleared or approved by the Food and Drug Administration      Vitamin B-12 09/03/2020 410  100 - 900 pg/mL Final    Folate 09/03/2020 7 8  3 1 - 17 5 ng/mL Final    Anticardiolipin IgA 09/03/2020 <9  0 - 11 APL U/mL Final                              Negative:              <12                            Indeterminate:     12 - 20                            Low-Med Positive: >20 - 80                            High Positive:         >80    Anticardiolipin IgG 09/03/2020 <9  0 - 14 GPL U/mL Final                              Negative:              <15                            Indeterminate:     15 - 20                            Low-Med Positive: >20 - 80                            High Positive:         >80    Anticardiolipin IgM 09/03/2020 <9  0 - 12 MPL U/mL Final                              Negative:              <13                            Indeterminate:     13 - 20                            Low-Med Positive: >20 - 80                            High Positive:         >80    Beta-2 Glyco 1 IgG 09/03/2020 <9  0 - 20 GPI IgG units Final    The reference interval reflects a 3SD or 99th percentile interval,  which is thought to represent a potentially clinically significant  result in accordance with the International Consensus Statement on  the classification criteria for definitive antiphospholipid syndrome  (APS)  J Thromb Haem 2006;4:295-306   Beta-2 Glyco 1 IgA 09/03/2020 <9  0 - 25 GPI IgA units Final    The reference interval reflects a 3SD or 99th percentile interval,  which is thought to represent a potentially clinically significant  result in accordance with the International Consensus Statement on  the classification criteria for definitive antiphospholipid syndrome  (APS)  J Thromb Haem 2006;4:295-306   Beta-2 Glyco 1 IgM 09/03/2020 <9  0 - 32 GPI IgM units Final    The reference interval reflects a 3SD or 99th percentile interval,  which is thought to represent a potentially clinically significant  result in accordance with the International Consensus Statement on  the classification criteria for definitive antiphospholipid syndrome  (APS)  J Thromb Haem 2006;4:295-306      PTT Lupus Anticoagulant 09/03/2020 36 7  0 0 - 51 9 sec Final    Dilute Viper Venom Time 09/03/2020 29 4  0 0 - 47 0 sec Final    DILUTE PROTHROMBIN TIME(DPT) 09/03/2020 36 3  0 0 - 55 0 sec Final    THROMBIN TIME (DRVW) 09/03/2020 21 1  0 0 - 23 0 sec Final    DPT CONFIRM RATIO 09/03/2020 1 14  0 00 - 1 40 Ratio Final    LUPUS REFLEX INTERPRETATION 09/03/2020 Comment:   Final    No lupus anticoagulant was detected   Ceruloplasmin 09/04/2020 19 1  19 0 - 39 0 mg/dL Final    Copper 09/04/2020 88  72 - 166 ug/dL Final                                    Detection Limit = 5    Angio Convert Enzyme 09/04/2020 24  14 - 82 U/L Final    SS-A (RO) Ab 09/04/2020 <0 2  0 0 - 0 9 AI Final    SS-B (LA) Ab 09/04/2020 <0 2  0 0 - 0 9 AI Final    PTH 09/04/2020 42 7  18 4 - 80 1 pg/mL Final    IgA 09/04/2020 93  87 - 352 mg/dL Final    Gliadin IgA 09/04/2020 4  0 - 19 units Final                       Negative                   0 - 19                     Weak Positive             20 - 30                     Moderate to Strong Positive   >30    Gliadin IgG 09/04/2020 1  0 - 19 units Final                       Negative                   0 - 19                     Weak Positive             20 - 30                     Moderate to Strong Positive   >30    Tissue Transglut Ab IGG 09/04/2020 <2  0 - 5 U/mL Final                                  Negative        0 - 5                                Weak Positive   6 - 9                                Positive           >9    TISSUE TRANSGLUTAMINASE IGA 09/04/2020 <2  0 - 3 U/mL Final                                  Negative        0 -  3                                Weak Positive   4 - 10                                Positive           >10   Tissue Transglutaminase (tTG) has been identified   as the endomysial antigen  Studies have demonstr-   ated that endomysial IgA antibodies have over 99%   specificity for gluten sensitive enteropathy      Endomysial IgA 09/04/2020 Negative  Negative Final    Iron Saturation 09/04/2020 12  % Final    TIBC 09/04/2020 417  250 - 450 ug/dL Final    Iron 09/04/2020 50  50 - 170 ug/dL Final    Patients treated with metal-binding drugs (ie  Deferoxamine) may have depressed iron values      Ferritin 09/04/2020 12  8 - 388 ng/mL Final   Admission on 09/02/2020, Discharged on 09/02/2020   Component Date Value Ref Range Status    WBC 09/02/2020 6 82  4 31 - 10 16 Thousand/uL Final    RBC 09/02/2020 4 35  3 81 - 5 12 Million/uL Final    Hemoglobin 09/02/2020 13 4  11 5 - 15 4 g/dL Final    Hematocrit 09/02/2020 41 0  34 8 - 46 1 % Final    MCV 09/02/2020 94  82 - 98 fL Final    MCH 09/02/2020 30 8  26 8 - 34 3 pg Final    MCHC 09/02/2020 32 7  31 4 - 37 4 g/dL Final    RDW 09/02/2020 12 5  11 6 - 15 1 % Final    MPV 09/02/2020 10 2  8 9 - 12 7 fL Final    Platelets 13/32/8716 190  149 - 390 Thousands/uL Final    nRBC 09/02/2020 0  /100 WBCs Final    Neutrophils Relative 09/02/2020 61  43 - 75 % Final    Immat GRANS % 09/02/2020 0  0 - 2 % Final    Lymphocytes Relative 09/02/2020 30  14 - 44 % Final    Monocytes Relative 09/02/2020 6  4 - 12 % Final    Eosinophils Relative 09/02/2020 2  0 - 6 % Final    Basophils Relative 09/02/2020 1  0 - 1 % Final    Neutrophils Absolute 09/02/2020 4 16  1 85 - 7 62 Thousands/µL Final    Immature Grans Absolute 09/02/2020 0 03  0 00 - 0 20 Thousand/uL Final    Lymphocytes Absolute 09/02/2020 2 04  0 60 - 4 47 Thousands/µL Final    Monocytes Absolute 09/02/2020 0 39  0 17 - 1 22 Thousand/µL Final    Eosinophils Absolute 09/02/2020 0 15  0 00 - 0 61 Thousand/µL Final    Basophils Absolute 09/02/2020 0 05  0 00 - 0 10 Thousands/µL Final    Sodium 09/02/2020 140  136 - 145 mmol/L Final    Potassium 09/02/2020 3 8  3 5 - 5 3 mmol/L Final    Chloride 09/02/2020 107  100 - 108 mmol/L Final    CO2 09/02/2020 29  21 - 32 mmol/L Final    ANION GAP 09/02/2020 4  4 - 13 mmol/L Final    BUN 09/02/2020 17  5 - 25 mg/dL Final    Creatinine 09/02/2020 0 82  0 60 - 1 30 mg/dL Final    Standardized to IDMS reference method    Glucose 09/02/2020 75  65 - 140 mg/dL Final    If the patient is fasting, the ADA then defines impaired fasting glucose as > 100 mg/dL and diabetes as > or equal to 123 mg/dL  Specimen collection should occur prior to Sulfasalazine administration due to the potential for falsely depressed results  Specimen collection should occur prior to Sulfapyridine administration due to the potential for falsely elevated results   Calcium 09/02/2020 8 4  8 3 - 10 1 mg/dL Final    AST 09/02/2020 45  5 - 45 U/L Final    Specimen collection should occur prior to Sulfasalazine administration due to the potential for falsely depressed results   ALT 09/02/2020 72  12 - 78 U/L Final    Specimen collection should occur prior to Sulfasalazine administration due to the potential for falsely depressed results   Alkaline Phosphatase 09/02/2020 24* 46 - 116 U/L Final    Total Protein 09/02/2020 6 8  6 4 - 8 2 g/dL Final    Albumin 09/02/2020 3 5  3 5 - 5 0 g/dL Final    Total Bilirubin 09/02/2020 0 52  0 20 - 1 00 mg/dL Final    Use of this assay is not recommended for patients undergoing treatment with eltrombopag due to the potential for falsely elevated results   eGFR 09/02/2020 91  ml/min/1 73sq m Final    TSH 3RD GENERATON 09/02/2020 1 664  0 358 - 3 740 uIU/mL Final    The recommended reference ranges for TSH during pregnancy are as follows:   First trimester 0 1 to 2 5 uIU/mL   Second trimester  0 2 to 3 0 uIU/mL   Third trimester 0 3 to 3 0 uIU/m    Note: Normal ranges may not apply to patients who are transgender, non-binary, or whose legal sex, sex at birth, and gender identity differ  Using supplements with high doses of biotin 20 to more than 300 times greater than the adequate daily intake for adults of 30 mcg/day as established by the [place] [employer], can cause falsely depress results      Magnesium 09/02/2020 1 9  1 6 - 2 6 mg/dL Final   Admission on 08/03/2020, Discharged on 08/03/2020   Component Date Value Ref Range Status    Herpes Simplex Virus Culture 08/03/2020 Negative-No Herpes Simplex Virus isolated    Negative-No Herpes Simplex Virus isolated , Toxic, Contaminated Final   Appointment on 05/18/2020   Component Date Value Ref Range Status    CRP 05/18/2020 <3 0  <3 0 mg/L Final    Rheumatoid Factor 05/18/2020 Positive* Negative Final    See RF Quantitation    Lyme IgG/IgM Ab 05/18/2020 <0 91  0 00 - 0 90 ISR Final                                    Negative         <0 91                                  Equivocal  0 91 - 1 09                                  Positive         >1 09    JM 05/18/2020 Negative  Negative Final    WBC 05/18/2020 6 81  4 31 - 10 16 Thousand/uL Final    RBC 05/18/2020 4 38  3 81 - 5 12 Million/uL Final    Hemoglobin 05/18/2020 13 6  11 5 - 15 4 g/dL Final    Hematocrit 05/18/2020 40 8  34 8 - 46 1 % Final    MCV 05/18/2020 93  82 - 98 fL Final    MCH 05/18/2020 31 1  26 8 - 34 3 pg Final    MCHC 05/18/2020 33 3  31 4 - 37 4 g/dL Final    RDW 05/18/2020 13 0  11 6 - 15 1 % Final    MPV 05/18/2020 10 5  8 9 - 12 7 fL Final    Platelets 14/01/9135 195  149 - 390 Thousands/uL Final    nRBC 05/18/2020 0  /100 WBCs Final    Neutrophils Relative 05/18/2020 61  43 - 75 % Final    Immat GRANS % 05/18/2020 0  0 - 2 % Final    Lymphocytes Relative 05/18/2020 30  14 - 44 % Final    Monocytes Relative 05/18/2020 6  4 - 12 % Final    Eosinophils Relative 05/18/2020 2  0 - 6 % Final    Basophils Relative 05/18/2020 1  0 - 1 % Final    Neutrophils Absolute 05/18/2020 4 17  1 85 - 7 62 Thousands/µL Final    Immature Grans Absolute 05/18/2020 0 01  0 00 - 0 20 Thousand/uL Final    Lymphocytes Absolute 05/18/2020 2 03  0 60 - 4 47 Thousands/µL Final    Monocytes Absolute 05/18/2020 0 39  0 17 - 1 22 Thousand/µL Final    Eosinophils Absolute 05/18/2020 0 16  0 00 - 0 61 Thousand/µL Final    Basophils Absolute 05/18/2020 0 05  0 00 - 0 10 Thousands/µL Final    Sodium 05/18/2020 138  136 - 145 mmol/L Final    Potassium 05/18/2020 3 7  3 5 - 5 3 mmol/L Final    Chloride 05/18/2020 105  100 - 108 mmol/L Final    CO2 05/18/2020 24  21 - 32 mmol/L Final    ANION GAP 05/18/2020 9  4 - 13 mmol/L Final    BUN 05/18/2020 13  5 - 25 mg/dL Final    Creatinine 05/18/2020 0 86  0 60 - 1 30 mg/dL Final    Standardized to IDMS reference method    Glucose, Fasting 05/18/2020 112* 65 - 99 mg/dL Final      Specimen collection should occur prior to Sulfasalazine administration due to the potential for falsely depressed results  Specimen collection should occur prior to Sulfapyridine administration due to the potential for falsely elevated results   Calcium 05/18/2020 8 6  8 3 - 10 1 mg/dL Final    AST 05/18/2020 35  5 - 45 U/L Final      Specimen collection should occur prior to Sulfasalazine administration due to the potential for falsely depressed results   ALT 05/18/2020 52  12 - 78 U/L Final      Specimen collection should occur prior to Sulfasalazine administration due to the potential for falsely depressed results   Alkaline Phosphatase 05/18/2020 31* 46 - 116 U/L Final    Total Protein 05/18/2020 7 0  6 4 - 8 2 g/dL Final    Albumin 05/18/2020 3 8  3 5 - 5 0 g/dL Final    Total Bilirubin 05/18/2020 0 20  0 20 - 1 00 mg/dL Final      Use of this assay is not recommended for patients undergoing treatment with eltrombopag due to the potential for falsely elevated results   eGFR 05/18/2020 87  ml/min/1 73sq m Final    Hemoglobin A1C 05/18/2020 5 1  Normal 3 8-5 6%; PreDiabetic 5 7-6 4%;  Diabetic >=6 5%; Glycemic control for adults with diabetes <7 0% % Final    EAG 05/18/2020 100  mg/dl Final    Cholesterol 05/18/2020 150  50 - 200 mg/dL Final      Cholesterol:       Desirable         <200 mg/dl       Borderline         200-239 mg/dl       High              >239           Triglycerides 05/18/2020 41  <=150 mg/dL Final      Triglyceride:     Normal          <150 mg/dl     Borderline High 150-199 mg/dl     High            200-499 mg/dl        Very High       >499 mg/dl    Specimen collection should occur prior to N-Acetylcysteine or Metamizole administration due to the potential for falsely depressed results   HDL, Direct 05/18/2020 89  >=40 mg/dL Final      HDL Cholesterol:       Low     <41 mg/dL  Specimen collection should occur prior to Metamizole administration due to the potential for falsley depressed results   LDL Calculated 05/18/2020 53  0 - 100 mg/dL Final      LDL Cholesterol:     Optimal           <100 mg/dl     Near Optimal      100-129 mg/dl     Above Optimal       Borderline High 130-159 mg/dl       High            160-189 mg/dl       Very High       >189 mg/dl         This screening LDL is a calculated result  It does not have the accuracy of the Direct Measured LDL in the monitoring of patients with hyperlipidemia and/or statin therapy  Direct Measure LDL (FWA837) must be ordered separately in these patients   Non-HDL-Chol (CHOL-HDL) 05/18/2020 61  mg/dl Final    TSH 3RD GENERATON 05/18/2020 2 389  0 358 - 3 740 uIU/mL Final      The recommended reference ranges for TSH during pregnancy are as follows:   First trimester 0 1 to 2 5 uIU/mL   Second trimester  0 2 to 3 0 uIU/mL   Third trimester 0 3 to 3 0 uIU/m    Note: Normal ranges may not apply to patients who are transgender, non-binary, or whose legal sex, sex at birth, and gender identity differ  Using supplements with high doses of biotin 20 to more than 300 times greater than the adequate daily intake for adults of 30 mcg/day as established by the [place] [employer], can cause falsely depress results      Hepatitis B Surface Ag 05/18/2020 Non-reactive  Non-reactive, NonReactive - Confirmed Final    Hepatitis C Ab 05/18/2020 High Reactive* Non-reactive Final    Hep B C IgM 05/18/2020 Non-reactive  Non-reactive Final    Hep B Core Total Ab 05/18/2020 Non-reactive  Non-reactive Final  HCV PCR Quantitative 05/18/2020 [id number]  IU/mL Final    Test Information 05/18/2020 Comment   Final    The quantitative range of this assay is 15 IU/mL to 100 million IU/mL      HCV Quantitative Log 05/18/2020 6 917  log10 IU/mL Final    H pylori Ag, Stl 05/18/2020 Negative  Negative Final    RF Quantitation 05/18/2020 10 IU/mL* (none) Final   Admission on 02/12/2020, Discharged on 02/12/2020   Component Date Value Ref Range Status    WBC 02/12/2020 7 81  4 31 - 10 16 Thousand/uL Final    RBC 02/12/2020 4 93  3 81 - 5 12 Million/uL Final    Hemoglobin 02/12/2020 15 1  11 5 - 15 4 g/dL Final    Hematocrit 02/12/2020 44 8  34 8 - 46 1 % Final    MCV 02/12/2020 91  82 - 98 fL Final    MCH 02/12/2020 30 6  26 8 - 34 3 pg Final    MCHC 02/12/2020 33 7  31 4 - 37 4 g/dL Final    RDW 02/12/2020 12 0  11 6 - 15 1 % Final    MPV 02/12/2020 10 3  8 9 - 12 7 fL Final    Platelets 56/91/4574 205  149 - 390 Thousands/uL Final    nRBC 02/12/2020 0  /100 WBCs Final    Neutrophils Relative 02/12/2020 59  43 - 75 % Final    Immat GRANS % 02/12/2020 0  0 - 2 % Final    Lymphocytes Relative 02/12/2020 33  14 - 44 % Final    Monocytes Relative 02/12/2020 5  4 - 12 % Final    Eosinophils Relative 02/12/2020 2  0 - 6 % Final    Basophils Relative 02/12/2020 1  0 - 1 % Final    Neutrophils Absolute 02/12/2020 4 70  1 85 - 7 62 Thousands/µL Final    Immature Grans Absolute 02/12/2020 0 02  0 00 - 0 20 Thousand/uL Final    Lymphocytes Absolute 02/12/2020 2 56  0 60 - 4 47 Thousands/µL Final    Monocytes Absolute 02/12/2020 0 35  0 17 - 1 22 Thousand/µL Final    Eosinophils Absolute 02/12/2020 0 13  0 00 - 0 61 Thousand/µL Final    Basophils Absolute 02/12/2020 0 05  0 00 - 0 10 Thousands/µL Final    Sodium 02/12/2020 142  136 - 145 mmol/L Final    Potassium 02/12/2020 2 9* 3 5 - 5 3 mmol/L Final    Chloride 02/12/2020 105  100 - 108 mmol/L Final    CO2 02/12/2020 23  21 - 32 mmol/L Final    ANION GAP 02/12/2020 14* 4 - 13 mmol/L Final    BUN 02/12/2020 13  5 - 25 mg/dL Final    Creatinine 02/12/2020 0 88  0 60 - 1 30 mg/dL Final    Standardized to IDMS reference method    Glucose 02/12/2020 101  65 - 140 mg/dL Final      If the patient is fasting, the ADA then defines impaired fasting glucose as > 100 mg/dL and diabetes as > or equal to 123 mg/dL  Specimen collection should occur prior to Sulfasalazine administration due to the potential for falsely depressed results  Specimen collection should occur prior to Sulfapyridine administration due to the potential for falsely elevated results   Calcium 02/12/2020 9 0  8 3 - 10 1 mg/dL Final    eGFR 02/12/2020 84  ml/min/1 73sq m Final    EXT PREG TEST UR (Ref: Negative) 02/12/2020 Negative   Final    Control 02/12/2020 Valid   Final    Color, UA 02/12/2020 Yellow   Final    Clarity, UA 02/12/2020 Clear   Final    pH, UA 02/12/2020 5 5  4 5 - 8 0 Final    Leukocytes, UA 02/12/2020 Negative  Negative Final    Nitrite, UA 02/12/2020 Negative  Negative Final    Protein, UA 02/12/2020 30 (1+)* Negative mg/dl Final    Glucose, UA 02/12/2020 Negative  Negative mg/dl Final    Ketones, UA 02/12/2020 Negative  Negative mg/dl Final    Urobilinogen, UA 02/12/2020 0 2  0 2, 1 0 E U /dl E U /dl Final    Bilirubin, UA 02/12/2020 Interference- unable to analyze* Negative Final    The dipstick result may be falsely positive due to interfering substances  We recommend reliance upon serum bilirubin, liver & kidney function tests to guide patient care if clinically indicated      Blood, UA 02/12/2020 Trace* Negative Final    Specific Gravity, UA 02/12/2020 >=1 030  1 003 - 1 030 Final    RBC, UA 02/12/2020 2-4* None Seen, 0-5 /hpf Final    WBC, UA 02/12/2020 1-2* None Seen, 0-5, 5-55, 5-65 /hpf Final    Epithelial Cells 02/12/2020 Moderate* None Seen, Occasional /hpf Final    Bacteria, UA 02/12/2020 Moderate* None Seen, Occasional /hpf Final    MUCUS THREADS 02/12/2020 Moderate* None Seen Final   Appointment on 11/27/2019   Component Date Value Ref Range Status    WBC 11/27/2019 9 04  4 31 - 10 16 Thousand/uL Final    RBC 11/27/2019 4 63  3 81 - 5 12 Million/uL Final    Hemoglobin 11/27/2019 14 1  11 5 - 15 4 g/dL Final    Hematocrit 11/27/2019 43 8  34 8 - 46 1 % Final    MCV 11/27/2019 95  82 - 98 fL Final    MCH 11/27/2019 30 5  26 8 - 34 3 pg Final    MCHC 11/27/2019 32 2  31 4 - 37 4 g/dL Final    RDW 11/27/2019 13 1  11 6 - 15 1 % Final    MPV 11/27/2019 10 5  8 9 - 12 7 fL Final    Platelets 39/24/7174 229  149 - 390 Thousands/uL Final    nRBC 11/27/2019 0  /100 WBCs Final    Neutrophils Relative 11/27/2019 71  43 - 75 % Final    Immat GRANS % 11/27/2019 0  0 - 2 % Final    Lymphocytes Relative 11/27/2019 21  14 - 44 % Final    Monocytes Relative 11/27/2019 5  4 - 12 % Final    Eosinophils Relative 11/27/2019 2  0 - 6 % Final    Basophils Relative 11/27/2019 1  0 - 1 % Final    Neutrophils Absolute 11/27/2019 6 41  1 85 - 7 62 Thousands/µL Final    Immature Grans Absolute 11/27/2019 0 04  0 00 - 0 20 Thousand/uL Final    Lymphocytes Absolute 11/27/2019 1 93  0 60 - 4 47 Thousands/µL Final    Monocytes Absolute 11/27/2019 0 46  0 17 - 1 22 Thousand/µL Final    Eosinophils Absolute 11/27/2019 0 14  0 00 - 0 61 Thousand/µL Final    Basophils Absolute 11/27/2019 0 06  0 00 - 0 10 Thousands/µL Final    Sodium 11/27/2019 141  136 - 145 mmol/L Final    Potassium 11/27/2019 4 3  3 5 - 5 3 mmol/L Final    Chloride 11/27/2019 109* 100 - 108 mmol/L Final    CO2 11/27/2019 29  21 - 32 mmol/L Final    ANION GAP 11/27/2019 3* 4 - 13 mmol/L Final    BUN 11/27/2019 11  5 - 25 mg/dL Final    Creatinine 11/27/2019 0 79  0 60 - 1 30 mg/dL Final    Standardized to IDMS reference method    Glucose, Fasting 11/27/2019 86  65 - 99 mg/dL Final      Specimen collection should occur prior to Sulfasalazine administration due to the potential for falsely depressed results  Specimen collection should occur prior to Sulfapyridine administration due to the potential for falsely elevated results   Calcium 11/27/2019 9 0  8 3 - 10 1 mg/dL Final    AST 11/27/2019 35  5 - 45 U/L Final      Specimen collection should occur prior to Sulfasalazine administration due to the potential for falsely depressed results   ALT 11/27/2019 53  12 - 78 U/L Final      Specimen collection should occur prior to Sulfasalazine and/or Sulfapyridine administration due to the potential for falsely depressed results   Alkaline Phosphatase 11/27/2019 31* 46 - 116 U/L Final    Total Protein 11/27/2019 7 0  6 4 - 8 2 g/dL Final    Albumin 11/27/2019 4 2  3 5 - 5 0 g/dL Final    Total Bilirubin 11/27/2019 0 63  0 20 - 1 00 mg/dL Final    eGFR 11/27/2019 96  ml/min/1 73sq m Final    Hemoglobin A1C 11/27/2019 5 3  4 2 - 6 3 % Final    EAG 11/27/2019 105  mg/dl Final    Cholesterol 11/27/2019 176  50 - 200 mg/dL Final      Cholesterol:       Desirable         <200 mg/dl       Borderline         200-239 mg/dl       High              >239           Triglycerides 11/27/2019 77  <=150 mg/dL Final      Triglyceride:     Normal          <150 mg/dl     Borderline High 150-199 mg/dl     High            200-499 mg/dl        Very High       >499 mg/dl    Specimen collection should occur prior to N-Acetylcysteine or Metamizole administration due to the potential for falsely depressed results   HDL, Direct 11/27/2019 96  >=40 mg/dL Final      HDL Cholesterol:       Low     <41 mg/dL  Specimen collection should occur prior to Metamizole administration due to the potential for falsley depressed results      LDL Calculated 11/27/2019 65  0 - 100 mg/dL Final      LDL Cholesterol:     Optimal           <100 mg/dl     Near Optimal      100-129 mg/dl     Above Optimal       Borderline High 130-159 mg/dl       High            160-189 mg/dl       Very High       >189 mg/dl         This screening LDL is a calculated result  It does not have the accuracy of the Direct Measured LDL in the monitoring of patients with hyperlipidemia and/or statin therapy  Direct Measure LDL (LEO508) must be ordered separately in these patients      Non-HDL-Chol (CHOL-HDL) 11/27/2019 80  mg/dl Final    A-1 Antitrypsin 11/27/2019 115  100 - 188 mg/dL Final                  **Please note reference interval change**    HIV-1/HIV-2 Ab 11/27/2019 Non-Reactive  Non-Reactive Final    Phosphorus 11/27/2019 3 3  2 7 - 4 5 mg/dL Final

## 2020-09-22 LAB — ALDOLASE SERPL-CCNC: 5.1 U/L (ref 3.3–10.3)

## 2020-09-23 LAB — CCP IGA+IGG SERPL IA-ACNC: 5 UNITS (ref 0–19)

## 2020-09-28 DIAGNOSIS — M79.7 FIBROMYALGIA: ICD-10-CM

## 2020-09-28 LAB — HLA-B27 QL NAA+PROBE: NEGATIVE

## 2020-09-29 RX ORDER — PREGABALIN 75 MG/1
75 CAPSULE ORAL 2 TIMES DAILY
Qty: 60 CAPSULE | Refills: 0 | Status: SHIPPED | OUTPATIENT
Start: 2020-09-29 | End: 2020-10-22 | Stop reason: SDUPTHER

## 2020-10-12 ENCOUNTER — NURSE TRIAGE (OUTPATIENT)
Dept: OTHER | Facility: OTHER | Age: 40
End: 2020-10-12

## 2020-10-19 ENCOUNTER — TELEMEDICINE (OUTPATIENT)
Dept: FAMILY MEDICINE CLINIC | Facility: CLINIC | Age: 40
End: 2020-10-19

## 2020-10-19 DIAGNOSIS — J45.40 MODERATE PERSISTENT ASTHMA, UNSPECIFIED WHETHER COMPLICATED: ICD-10-CM

## 2020-10-19 DIAGNOSIS — J45.40 MODERATE PERSISTENT ASTHMA WITHOUT COMPLICATION: ICD-10-CM

## 2020-10-19 DIAGNOSIS — J45.40 MODERATE PERSISTENT ASTHMA, UNSPECIFIED WHETHER COMPLICATED: Primary | ICD-10-CM

## 2020-10-19 DIAGNOSIS — M79.606 LUMBAR PAIN WITH RADIATION DOWN LEG: ICD-10-CM

## 2020-10-19 DIAGNOSIS — M54.50 LUMBAR PAIN WITH RADIATION DOWN LEG: ICD-10-CM

## 2020-10-19 DIAGNOSIS — R10.84 GENERALIZED ABDOMINAL PAIN: ICD-10-CM

## 2020-10-19 PROCEDURE — G2012 BRIEF CHECK IN BY MD/QHP: HCPCS | Performed by: NURSE PRACTITIONER

## 2020-10-19 RX ORDER — IPRATROPIUM BROMIDE AND ALBUTEROL SULFATE 2.5; .5 MG/3ML; MG/3ML
3 SOLUTION RESPIRATORY (INHALATION) 4 TIMES DAILY
Qty: 50 VIAL | Refills: 3 | Status: SHIPPED | OUTPATIENT
Start: 2020-10-19

## 2020-10-19 RX ORDER — CYCLOBENZAPRINE HCL 10 MG
10 TABLET ORAL 3 TIMES DAILY PRN
Qty: 90 TABLET | Refills: 0 | Status: SHIPPED | OUTPATIENT
Start: 2020-10-19 | End: 2020-11-26 | Stop reason: SDUPTHER

## 2020-10-19 RX ORDER — OMEPRAZOLE 40 MG/1
40 CAPSULE, DELAYED RELEASE ORAL DAILY
Qty: 30 CAPSULE | Refills: 0 | Status: SHIPPED | OUTPATIENT
Start: 2020-10-19 | End: 2020-11-25

## 2020-10-19 RX ORDER — ALBUTEROL SULFATE 90 UG/1
1 AEROSOL, METERED RESPIRATORY (INHALATION) EVERY 4 HOURS PRN
Qty: 18 INHALER | Refills: 3 | Status: SHIPPED | OUTPATIENT
Start: 2020-10-19 | End: 2021-02-08 | Stop reason: SDUPTHER

## 2020-10-19 RX ORDER — PREDNISONE 20 MG/1
40 TABLET ORAL DAILY
Qty: 10 TABLET | Refills: 0 | Status: SHIPPED | OUTPATIENT
Start: 2020-10-19 | End: 2020-10-24

## 2020-10-19 RX ORDER — AZITHROMYCIN 250 MG/1
TABLET, FILM COATED ORAL
Qty: 6 TABLET | Refills: 0 | Status: SHIPPED | OUTPATIENT
Start: 2020-10-19 | End: 2020-10-23

## 2020-10-19 RX ORDER — MONTELUKAST SODIUM 10 MG/1
10 TABLET ORAL
Qty: 30 TABLET | Refills: 0 | Status: SHIPPED | OUTPATIENT
Start: 2020-10-19 | End: 2020-11-25

## 2020-10-22 DIAGNOSIS — M79.7 FIBROMYALGIA: ICD-10-CM

## 2020-10-22 RX ORDER — PREGABALIN 75 MG/1
75 CAPSULE ORAL 2 TIMES DAILY
Qty: 60 CAPSULE | Refills: 0 | Status: SHIPPED | OUTPATIENT
Start: 2020-10-22 | End: 2020-11-26 | Stop reason: SDUPTHER

## 2020-10-28 ENCOUNTER — TELEPHONE (OUTPATIENT)
Dept: FAMILY MEDICINE CLINIC | Facility: CLINIC | Age: 40
End: 2020-10-28

## 2020-10-28 ENCOUNTER — TELEPHONE (OUTPATIENT)
Dept: GASTROENTEROLOGY | Facility: AMBULARY SURGERY CENTER | Age: 40
End: 2020-10-28

## 2020-10-28 ENCOUNTER — TELEPHONE (OUTPATIENT)
Dept: NEUROLOGY | Facility: CLINIC | Age: 40
End: 2020-10-28

## 2020-11-02 ENCOUNTER — TELEPHONE (OUTPATIENT)
Dept: FAMILY MEDICINE CLINIC | Facility: CLINIC | Age: 40
End: 2020-11-02

## 2020-11-04 ENCOUNTER — TELEMEDICINE (OUTPATIENT)
Dept: GASTROENTEROLOGY | Facility: MEDICAL CENTER | Age: 40
End: 2020-11-04
Payer: COMMERCIAL

## 2020-11-04 DIAGNOSIS — R11.2 NAUSEA AND VOMITING, INTRACTABILITY OF VOMITING NOT SPECIFIED, UNSPECIFIED VOMITING TYPE: ICD-10-CM

## 2020-11-04 DIAGNOSIS — R10.84 GENERALIZED ABDOMINAL PAIN: ICD-10-CM

## 2020-11-04 DIAGNOSIS — B18.2 HEPATITIS C VIRUS CARRIER STATE (HCC): Primary | ICD-10-CM

## 2020-11-04 PROCEDURE — 99214 OFFICE O/P EST MOD 30 MIN: CPT | Performed by: PHYSICIAN ASSISTANT

## 2020-11-05 ENCOUNTER — TELEPHONE (OUTPATIENT)
Dept: GASTROENTEROLOGY | Facility: CLINIC | Age: 40
End: 2020-11-05

## 2020-11-05 DIAGNOSIS — B18.2 HEP C W/O COMA, CHRONIC (HCC): Primary | ICD-10-CM

## 2020-11-06 ENCOUNTER — HOSPITAL ENCOUNTER (OUTPATIENT)
Facility: HOSPITAL | Age: 40
Setting detail: OBSERVATION
Discharge: HOME | End: 2020-11-07
Attending: EMERGENCY MEDICINE | Admitting: INTERNAL MEDICINE
Payer: COMMERCIAL

## 2020-11-06 DIAGNOSIS — R07.89 CHEST WALL PAIN: ICD-10-CM

## 2020-11-06 DIAGNOSIS — R93.89 ABNORMAL CHEST CT: Primary | ICD-10-CM

## 2020-11-06 DIAGNOSIS — B19.20 HEPATITIS C: ICD-10-CM

## 2020-11-06 DIAGNOSIS — R07.81 PLEURITIC CHEST PAIN: ICD-10-CM

## 2020-11-06 DIAGNOSIS — Z72.0 TOBACCO USE: ICD-10-CM

## 2020-11-06 PROCEDURE — 99285 EMERGENCY DEPT VISIT HI MDM: CPT

## 2020-11-06 PROCEDURE — 81025 URINE PREGNANCY TEST: CPT | Performed by: NURSE PRACTITIONER

## 2020-11-06 RX ORDER — KETOROLAC TROMETHAMINE 30 MG/ML
15 INJECTION, SOLUTION INTRAMUSCULAR; INTRAVENOUS ONCE
Status: COMPLETED | OUTPATIENT
Start: 2020-11-07 | End: 2020-11-07

## 2020-11-07 ENCOUNTER — APPOINTMENT (EMERGENCY)
Dept: CT IMAGING | Facility: HOSPITAL | Age: 40
End: 2020-11-07
Payer: COMMERCIAL

## 2020-11-07 VITALS
WEIGHT: 126 LBS | BODY MASS INDEX: 22.32 KG/M2 | DIASTOLIC BLOOD PRESSURE: 100 MMHG | SYSTOLIC BLOOD PRESSURE: 132 MMHG | OXYGEN SATURATION: 96 % | HEART RATE: 73 BPM | RESPIRATION RATE: 18 BRPM | TEMPERATURE: 96.9 F

## 2020-11-07 PROBLEM — R93.89 ABNORMAL CT OF THE CHEST: Status: ACTIVE | Noted: 2020-11-07

## 2020-11-07 PROBLEM — R07.81 PLEURITIC CHEST PAIN: Status: ACTIVE | Noted: 2020-11-07

## 2020-11-07 LAB
ALBUMIN SERPL BCP-MCNC: 3 G/DL (ref 3.5–5)
ALBUMIN SERPL BCP-MCNC: 3.5 G/DL (ref 3.5–5)
ALP SERPL-CCNC: 33 U/L (ref 46–116)
ALP SERPL-CCNC: 38 U/L (ref 46–116)
ALT SERPL W P-5'-P-CCNC: 39 U/L (ref 12–78)
ALT SERPL W P-5'-P-CCNC: 46 U/L (ref 12–78)
AMPHETAMINES SERPL QL SCN: NEGATIVE
ANION GAP SERPL CALCULATED.3IONS-SCNC: 5 MMOL/L (ref 4–13)
ANION GAP SERPL CALCULATED.3IONS-SCNC: 8 MMOL/L (ref 4–13)
APTT PPP: 26 SECONDS (ref 23–37)
AST SERPL W P-5'-P-CCNC: 26 U/L (ref 5–45)
AST SERPL W P-5'-P-CCNC: 30 U/L (ref 5–45)
BACTERIA UR QL AUTO: ABNORMAL /HPF
BARBITURATES UR QL: NEGATIVE
BASOPHILS # BLD AUTO: 0.05 THOUSANDS/ΜL (ref 0–0.1)
BASOPHILS # BLD AUTO: 0.06 THOUSANDS/ΜL (ref 0–0.1)
BASOPHILS NFR BLD AUTO: 1 % (ref 0–1)
BASOPHILS NFR BLD AUTO: 1 % (ref 0–1)
BENZODIAZ UR QL: NEGATIVE
BILIRUB SERPL-MCNC: 0.27 MG/DL (ref 0.2–1)
BILIRUB SERPL-MCNC: 0.36 MG/DL (ref 0.2–1)
BILIRUB UR QL STRIP: ABNORMAL
BUN SERPL-MCNC: 15 MG/DL (ref 5–25)
BUN SERPL-MCNC: 18 MG/DL (ref 5–25)
CALCIUM ALBUM COR SERPL-MCNC: 8.9 MG/DL (ref 8.3–10.1)
CALCIUM SERPL-MCNC: 8.1 MG/DL (ref 8.3–10.1)
CALCIUM SERPL-MCNC: 8.5 MG/DL (ref 8.3–10.1)
CHLORIDE SERPL-SCNC: 104 MMOL/L (ref 100–108)
CHLORIDE SERPL-SCNC: 106 MMOL/L (ref 100–108)
CK SERPL-CCNC: 109 U/L (ref 26–192)
CLARITY UR: CLEAR
CO2 SERPL-SCNC: 25 MMOL/L (ref 21–32)
CO2 SERPL-SCNC: 30 MMOL/L (ref 21–32)
COCAINE UR QL: NEGATIVE
COLOR UR: ABNORMAL
COLOR, POC: ABNORMAL
CREAT SERPL-MCNC: 0.66 MG/DL (ref 0.6–1.3)
CREAT SERPL-MCNC: 0.78 MG/DL (ref 0.6–1.3)
EOSINOPHIL # BLD AUTO: 0.17 THOUSAND/ΜL (ref 0–0.61)
EOSINOPHIL # BLD AUTO: 0.18 THOUSAND/ΜL (ref 0–0.61)
EOSINOPHIL NFR BLD AUTO: 2 % (ref 0–6)
EOSINOPHIL NFR BLD AUTO: 2 % (ref 0–6)
ERYTHROCYTE [DISTWIDTH] IN BLOOD BY AUTOMATED COUNT: 12.7 % (ref 11.6–15.1)
ERYTHROCYTE [DISTWIDTH] IN BLOOD BY AUTOMATED COUNT: 12.8 % (ref 11.6–15.1)
EXT PREG TEST URINE: NEGATIVE
EXT. CONTROL ED NAV: NORMAL
GFR SERPL CREATININE-BSD FRML MDRD: 112 ML/MIN/1.73SQ M
GFR SERPL CREATININE-BSD FRML MDRD: 97 ML/MIN/1.73SQ M
GLUCOSE SERPL-MCNC: 84 MG/DL (ref 65–140)
GLUCOSE SERPL-MCNC: 94 MG/DL (ref 65–140)
GLUCOSE UR STRIP-MCNC: NEGATIVE MG/DL
HCT VFR BLD AUTO: 38.1 % (ref 34.8–46.1)
HCT VFR BLD AUTO: 38.6 % (ref 34.8–46.1)
HGB BLD-MCNC: 12.5 G/DL (ref 11.5–15.4)
HGB BLD-MCNC: 12.5 G/DL (ref 11.5–15.4)
HGB UR QL STRIP.AUTO: NEGATIVE
IMM GRANULOCYTES # BLD AUTO: 0.03 THOUSAND/UL (ref 0–0.2)
IMM GRANULOCYTES # BLD AUTO: 0.04 THOUSAND/UL (ref 0–0.2)
IMM GRANULOCYTES NFR BLD AUTO: 0 % (ref 0–2)
IMM GRANULOCYTES NFR BLD AUTO: 0 % (ref 0–2)
INR PPP: 0.88 (ref 0.84–1.19)
KETONES UR STRIP-MCNC: NEGATIVE MG/DL
LEUKOCYTE ESTERASE UR QL STRIP: NEGATIVE
LIPASE SERPL-CCNC: 204 U/L (ref 73–393)
LYMPHOCYTES # BLD AUTO: 2.15 THOUSANDS/ΜL (ref 0.6–4.47)
LYMPHOCYTES # BLD AUTO: 2.28 THOUSANDS/ΜL (ref 0.6–4.47)
LYMPHOCYTES NFR BLD AUTO: 20 % (ref 14–44)
LYMPHOCYTES NFR BLD AUTO: 25 % (ref 14–44)
MAGNESIUM SERPL-MCNC: 2 MG/DL (ref 1.6–2.6)
MCH RBC QN AUTO: 30.2 PG (ref 26.8–34.3)
MCH RBC QN AUTO: 30.6 PG (ref 26.8–34.3)
MCHC RBC AUTO-ENTMCNC: 32.4 G/DL (ref 31.4–37.4)
MCHC RBC AUTO-ENTMCNC: 32.8 G/DL (ref 31.4–37.4)
MCV RBC AUTO: 93 FL (ref 82–98)
MCV RBC AUTO: 93 FL (ref 82–98)
METHADONE UR QL: NEGATIVE
MONOCYTES # BLD AUTO: 0.68 THOUSAND/ΜL (ref 0.17–1.22)
MONOCYTES # BLD AUTO: 0.72 THOUSAND/ΜL (ref 0.17–1.22)
MONOCYTES NFR BLD AUTO: 7 % (ref 4–12)
MONOCYTES NFR BLD AUTO: 8 % (ref 4–12)
MUCOUS THREADS UR QL AUTO: ABNORMAL
NEUTROPHILS # BLD AUTO: 5.82 THOUSANDS/ΜL (ref 1.85–7.62)
NEUTROPHILS # BLD AUTO: 7.59 THOUSANDS/ΜL (ref 1.85–7.62)
NEUTS SEG NFR BLD AUTO: 64 % (ref 43–75)
NEUTS SEG NFR BLD AUTO: 70 % (ref 43–75)
NITRITE UR QL STRIP: NEGATIVE
NON-SQ EPI CELLS URNS QL MICRO: ABNORMAL /HPF
NRBC BLD AUTO-RTO: 0 /100 WBCS
NRBC BLD AUTO-RTO: 0 /100 WBCS
OPIATES UR QL SCN: NEGATIVE
OXYCODONE+OXYMORPHONE UR QL SCN: NEGATIVE
PCP UR QL: NEGATIVE
PH UR STRIP.AUTO: 5.5 [PH] (ref 4.5–8)
PLATELET # BLD AUTO: 181 THOUSANDS/UL (ref 149–390)
PLATELET # BLD AUTO: 184 THOUSANDS/UL (ref 149–390)
PMV BLD AUTO: 10 FL (ref 8.9–12.7)
PMV BLD AUTO: 10.1 FL (ref 8.9–12.7)
POTASSIUM SERPL-SCNC: 3.4 MMOL/L (ref 3.5–5.3)
POTASSIUM SERPL-SCNC: 3.7 MMOL/L (ref 3.5–5.3)
PROCALCITONIN SERPL-MCNC: <0.05 NG/ML
PROT SERPL-MCNC: 6.1 G/DL (ref 6.4–8.2)
PROT SERPL-MCNC: 6.8 G/DL (ref 6.4–8.2)
PROT UR STRIP-MCNC: ABNORMAL MG/DL
PROTHROMBIN TIME: 11.8 SECONDS (ref 11.6–14.5)
RBC # BLD AUTO: 4.09 MILLION/UL (ref 3.81–5.12)
RBC # BLD AUTO: 4.14 MILLION/UL (ref 3.81–5.12)
RBC #/AREA URNS AUTO: ABNORMAL /HPF
SARS-COV-2 RNA RESP QL NAA+PROBE: NEGATIVE
SODIUM SERPL-SCNC: 139 MMOL/L (ref 136–145)
SODIUM SERPL-SCNC: 139 MMOL/L (ref 136–145)
SP GR UR STRIP.AUTO: >=1.03 (ref 1–1.03)
THC UR QL: NEGATIVE
TROPONIN I SERPL-MCNC: <0.02 NG/ML
UROBILINOGEN UR QL STRIP.AUTO: 0.2 E.U./DL
WBC # BLD AUTO: 10.73 THOUSAND/UL (ref 4.31–10.16)
WBC # BLD AUTO: 9.04 THOUSAND/UL (ref 4.31–10.16)
WBC #/AREA URNS AUTO: ABNORMAL /HPF

## 2020-11-07 PROCEDURE — 99285 EMERGENCY DEPT VISIT HI MDM: CPT | Performed by: EMERGENCY MEDICINE

## 2020-11-07 PROCEDURE — 85730 THROMBOPLASTIN TIME PARTIAL: CPT | Performed by: NURSE PRACTITIONER

## 2020-11-07 PROCEDURE — 36415 COLL VENOUS BLD VENIPUNCTURE: CPT | Performed by: NURSE PRACTITIONER

## 2020-11-07 PROCEDURE — 87389 HIV-1 AG W/HIV-1&-2 AB AG IA: CPT | Performed by: PHYSICIAN ASSISTANT

## 2020-11-07 PROCEDURE — 84484 ASSAY OF TROPONIN QUANT: CPT | Performed by: NURSE PRACTITIONER

## 2020-11-07 PROCEDURE — NC001 PR NO CHARGE: Performed by: INTERNAL MEDICINE

## 2020-11-07 PROCEDURE — 71275 CT ANGIOGRAPHY CHEST: CPT

## 2020-11-07 PROCEDURE — 83690 ASSAY OF LIPASE: CPT | Performed by: PHYSICIAN ASSISTANT

## 2020-11-07 PROCEDURE — 82550 ASSAY OF CK (CPK): CPT | Performed by: NURSE PRACTITIONER

## 2020-11-07 PROCEDURE — 99236 HOSP IP/OBS SAME DATE HI 85: CPT | Performed by: INTERNAL MEDICINE

## 2020-11-07 PROCEDURE — 96361 HYDRATE IV INFUSION ADD-ON: CPT

## 2020-11-07 PROCEDURE — 96374 THER/PROPH/DIAG INJ IV PUSH: CPT

## 2020-11-07 PROCEDURE — 85025 COMPLETE CBC W/AUTO DIFF WBC: CPT | Performed by: NURSE PRACTITIONER

## 2020-11-07 PROCEDURE — 84145 PROCALCITONIN (PCT): CPT | Performed by: PHYSICIAN ASSISTANT

## 2020-11-07 PROCEDURE — 85610 PROTHROMBIN TIME: CPT | Performed by: NURSE PRACTITIONER

## 2020-11-07 PROCEDURE — 93005 ELECTROCARDIOGRAM TRACING: CPT

## 2020-11-07 PROCEDURE — 81001 URINALYSIS AUTO W/SCOPE: CPT

## 2020-11-07 PROCEDURE — 99244 OFF/OP CNSLTJ NEW/EST MOD 40: CPT | Performed by: INTERNAL MEDICINE

## 2020-11-07 PROCEDURE — G1004 CDSM NDSC: HCPCS

## 2020-11-07 PROCEDURE — 80053 COMPREHEN METABOLIC PANEL: CPT | Performed by: PHYSICIAN ASSISTANT

## 2020-11-07 PROCEDURE — 85025 COMPLETE CBC W/AUTO DIFF WBC: CPT | Performed by: PHYSICIAN ASSISTANT

## 2020-11-07 PROCEDURE — 80053 COMPREHEN METABOLIC PANEL: CPT | Performed by: NURSE PRACTITIONER

## 2020-11-07 PROCEDURE — 83735 ASSAY OF MAGNESIUM: CPT | Performed by: NURSE PRACTITIONER

## 2020-11-07 PROCEDURE — 87635 SARS-COV-2 COVID-19 AMP PRB: CPT | Performed by: NURSE PRACTITIONER

## 2020-11-07 PROCEDURE — 80307 DRUG TEST PRSMV CHEM ANLYZR: CPT | Performed by: NURSE PRACTITIONER

## 2020-11-07 RX ORDER — PREDNISONE 20 MG/1
20 TABLET ORAL DAILY
Qty: 6 TABLET | Refills: 0 | Status: SHIPPED | OUTPATIENT
Start: 2020-11-08 | End: 2020-11-14

## 2020-11-07 RX ORDER — DIAZEPAM 5 MG/ML
2.5 INJECTION, SOLUTION INTRAMUSCULAR; INTRAVENOUS ONCE
Status: COMPLETED | OUTPATIENT
Start: 2020-11-07 | End: 2020-11-07

## 2020-11-07 RX ORDER — FAMOTIDINE 20 MG/1
20 TABLET, FILM COATED ORAL 2 TIMES DAILY
Status: DISCONTINUED | OUTPATIENT
Start: 2020-11-07 | End: 2020-11-07 | Stop reason: HOSPADM

## 2020-11-07 RX ORDER — FLUTICASONE FUROATE AND VILANTEROL 100; 25 UG/1; UG/1
1 POWDER RESPIRATORY (INHALATION) DAILY
Status: DISCONTINUED | OUTPATIENT
Start: 2020-11-07 | End: 2020-11-07 | Stop reason: HOSPADM

## 2020-11-07 RX ORDER — PANTOPRAZOLE SODIUM 40 MG/1
40 TABLET, DELAYED RELEASE ORAL
Status: DISCONTINUED | OUTPATIENT
Start: 2020-11-07 | End: 2020-11-07 | Stop reason: HOSPADM

## 2020-11-07 RX ORDER — ALBUTEROL SULFATE 90 UG/1
1 AEROSOL, METERED RESPIRATORY (INHALATION) EVERY 4 HOURS PRN
Status: DISCONTINUED | OUTPATIENT
Start: 2020-11-07 | End: 2020-11-07 | Stop reason: HOSPADM

## 2020-11-07 RX ORDER — POTASSIUM CHLORIDE 20 MEQ/1
40 TABLET, EXTENDED RELEASE ORAL ONCE
Status: DISCONTINUED | OUTPATIENT
Start: 2020-11-07 | End: 2020-11-07 | Stop reason: HOSPADM

## 2020-11-07 RX ORDER — NICOTINE 21 MG/24HR
1 PATCH, TRANSDERMAL 24 HOURS TRANSDERMAL DAILY
Status: DISCONTINUED | OUTPATIENT
Start: 2020-11-07 | End: 2020-11-07 | Stop reason: HOSPADM

## 2020-11-07 RX ORDER — SODIUM CHLORIDE 9 MG/ML
50 INJECTION, SOLUTION INTRAVENOUS CONTINUOUS
Status: DISCONTINUED | OUTPATIENT
Start: 2020-11-07 | End: 2020-11-07 | Stop reason: HOSPADM

## 2020-11-07 RX ORDER — SUMATRIPTAN 50 MG/1
50 TABLET, FILM COATED ORAL ONCE AS NEEDED
Status: DISCONTINUED | OUTPATIENT
Start: 2020-11-07 | End: 2020-11-07 | Stop reason: HOSPADM

## 2020-11-07 RX ORDER — IBUPROFEN 800 MG/1
800 TABLET ORAL EVERY 8 HOURS PRN
Qty: 15 TABLET | Refills: 0 | Status: SHIPPED | OUTPATIENT
Start: 2020-11-07 | End: 2020-12-02

## 2020-11-07 RX ORDER — KETOROLAC TROMETHAMINE 30 MG/ML
30 INJECTION, SOLUTION INTRAMUSCULAR; INTRAVENOUS ONCE
Status: COMPLETED | OUTPATIENT
Start: 2020-11-07 | End: 2020-11-07

## 2020-11-07 RX ORDER — ACETAMINOPHEN 325 MG/1
975 TABLET ORAL EVERY 8 HOURS SCHEDULED
Status: DISCONTINUED | OUTPATIENT
Start: 2020-11-07 | End: 2020-11-07

## 2020-11-07 RX ORDER — HEPARIN SODIUM 5000 [USP'U]/ML
5000 INJECTION, SOLUTION INTRAVENOUS; SUBCUTANEOUS EVERY 8 HOURS SCHEDULED
Status: DISCONTINUED | OUTPATIENT
Start: 2020-11-07 | End: 2020-11-07 | Stop reason: HOSPADM

## 2020-11-07 RX ORDER — ACETAMINOPHEN 325 MG/1
975 TABLET ORAL EVERY 8 HOURS SCHEDULED
Status: DISCONTINUED | OUTPATIENT
Start: 2020-11-07 | End: 2020-11-07 | Stop reason: HOSPADM

## 2020-11-07 RX ORDER — KETOROLAC TROMETHAMINE 30 MG/ML
30 INJECTION, SOLUTION INTRAMUSCULAR; INTRAVENOUS EVERY 6 HOURS PRN
Status: DISCONTINUED | OUTPATIENT
Start: 2020-11-07 | End: 2020-11-07 | Stop reason: HOSPADM

## 2020-11-07 RX ORDER — PREGABALIN 75 MG/1
75 CAPSULE ORAL 2 TIMES DAILY
Status: DISCONTINUED | OUTPATIENT
Start: 2020-11-07 | End: 2020-11-07 | Stop reason: HOSPADM

## 2020-11-07 RX ORDER — PREDNISONE 10 MG/1
10 TABLET ORAL DAILY
Qty: 7 TABLET | Refills: 0 | Status: SHIPPED | OUTPATIENT
Start: 2020-11-14 | End: 2020-11-21

## 2020-11-07 RX ORDER — PREDNISONE 20 MG/1
20 TABLET ORAL DAILY
Status: DISCONTINUED | OUTPATIENT
Start: 2020-11-07 | End: 2020-11-07 | Stop reason: HOSPADM

## 2020-11-07 RX ORDER — IBUPROFEN 600 MG/1
600 TABLET ORAL EVERY 6 HOURS SCHEDULED
Status: DISCONTINUED | OUTPATIENT
Start: 2020-11-07 | End: 2020-11-07 | Stop reason: HOSPADM

## 2020-11-07 RX ORDER — ONDANSETRON 2 MG/ML
4 INJECTION INTRAMUSCULAR; INTRAVENOUS EVERY 6 HOURS PRN
Status: DISCONTINUED | OUTPATIENT
Start: 2020-11-07 | End: 2020-11-07 | Stop reason: HOSPADM

## 2020-11-07 RX ORDER — MONTELUKAST SODIUM 10 MG/1
10 TABLET ORAL
Status: DISCONTINUED | OUTPATIENT
Start: 2020-11-07 | End: 2020-11-07 | Stop reason: HOSPADM

## 2020-11-07 RX ORDER — DIAZEPAM 5 MG/ML
2.5 INJECTION, SOLUTION INTRAMUSCULAR; INTRAVENOUS EVERY 6 HOURS PRN
Status: DISCONTINUED | OUTPATIENT
Start: 2020-11-07 | End: 2020-11-07 | Stop reason: HOSPADM

## 2020-11-07 RX ORDER — PREDNISONE 10 MG/1
10 TABLET ORAL DAILY
Status: DISCONTINUED | OUTPATIENT
Start: 2020-11-14 | End: 2020-11-07 | Stop reason: HOSPADM

## 2020-11-07 RX ORDER — LIDOCAINE 50 MG/G
1 PATCH TOPICAL DAILY
Status: DISCONTINUED | OUTPATIENT
Start: 2020-11-07 | End: 2020-11-07 | Stop reason: HOSPADM

## 2020-11-07 RX ADMIN — KETOROLAC TROMETHAMINE 30 MG: 30 INJECTION, SOLUTION INTRAMUSCULAR at 04:54

## 2020-11-07 RX ADMIN — PREDNISONE 20 MG: 20 TABLET ORAL at 12:14

## 2020-11-07 RX ADMIN — SODIUM CHLORIDE 1000 ML: 0.9 INJECTION, SOLUTION INTRAVENOUS at 01:04

## 2020-11-07 RX ADMIN — KETOROLAC TROMETHAMINE 15 MG: 30 INJECTION, SOLUTION INTRAMUSCULAR at 01:06

## 2020-11-07 RX ADMIN — PREGABALIN 75 MG: 75 CAPSULE ORAL at 08:42

## 2020-11-07 RX ADMIN — SODIUM CHLORIDE 50 ML/HR: 0.9 INJECTION, SOLUTION INTRAVENOUS at 06:52

## 2020-11-07 RX ADMIN — DIAZEPAM 2.5 MG: 10 INJECTION, SOLUTION INTRAMUSCULAR; INTRAVENOUS at 06:47

## 2020-11-07 RX ADMIN — IOHEXOL 85 ML: 350 INJECTION, SOLUTION INTRAVENOUS at 02:45

## 2020-11-07 RX ADMIN — FAMOTIDINE 20 MG: 20 TABLET, FILM COATED ORAL at 12:14

## 2020-11-07 RX ADMIN — IBUPROFEN 600 MG: 600 TABLET ORAL at 12:14

## 2020-11-07 RX ADMIN — KETOROLAC TROMETHAMINE 30 MG: 30 INJECTION, SOLUTION INTRAMUSCULAR at 07:10

## 2020-11-08 LAB
ATRIAL RATE: 94 BPM
P AXIS: 91 DEGREES
PR INTERVAL: 128 MS
QRS AXIS: 81 DEGREES
QRSD INTERVAL: 72 MS
QT INTERVAL: 374 MS
QTC INTERVAL: 467 MS
T WAVE AXIS: 79 DEGREES
VENTRICULAR RATE: 94 BPM

## 2020-11-08 PROCEDURE — 93010 ELECTROCARDIOGRAM REPORT: CPT | Performed by: INTERNAL MEDICINE

## 2020-11-09 LAB — HIV 1+2 AB+HIV1 P24 AG SERPL QL IA: NORMAL

## 2020-11-12 ENCOUNTER — TELEPHONE (OUTPATIENT)
Dept: GASTROENTEROLOGY | Facility: CLINIC | Age: 40
End: 2020-11-12

## 2020-11-25 DIAGNOSIS — J45.40 MODERATE PERSISTENT ASTHMA, UNSPECIFIED WHETHER COMPLICATED: ICD-10-CM

## 2020-11-25 DIAGNOSIS — J30.89 ALLERGIC RHINITIS DUE TO OTHER ALLERGIC TRIGGER, UNSPECIFIED SEASONALITY: ICD-10-CM

## 2020-11-25 DIAGNOSIS — J45.40 MODERATE PERSISTENT ASTHMA WITHOUT COMPLICATION: ICD-10-CM

## 2020-11-25 DIAGNOSIS — R10.84 GENERALIZED ABDOMINAL PAIN: ICD-10-CM

## 2020-11-25 RX ORDER — OMEPRAZOLE 40 MG/1
40 CAPSULE, DELAYED RELEASE ORAL DAILY
Qty: 90 CAPSULE | Refills: 1 | Status: SHIPPED | OUTPATIENT
Start: 2020-11-25

## 2020-11-25 RX ORDER — LORATADINE 10 MG/1
10 TABLET ORAL DAILY
Qty: 30 TABLET | Refills: 5 | Status: SHIPPED | OUTPATIENT
Start: 2020-11-25 | End: 2022-07-13

## 2020-11-25 RX ORDER — MONTELUKAST SODIUM 10 MG/1
10 TABLET ORAL
Qty: 90 TABLET | Refills: 1 | Status: SHIPPED | OUTPATIENT
Start: 2020-11-25

## 2020-11-25 RX ORDER — BUDESONIDE AND FORMOTEROL FUMARATE DIHYDRATE 160; 4.5 UG/1; UG/1
AEROSOL RESPIRATORY (INHALATION)
Qty: 10.2 INHALER | Refills: 5 | Status: SHIPPED | OUTPATIENT
Start: 2020-11-25 | End: 2021-02-17 | Stop reason: SDUPTHER

## 2020-11-26 DIAGNOSIS — M79.606 LUMBAR PAIN WITH RADIATION DOWN LEG: ICD-10-CM

## 2020-11-26 DIAGNOSIS — M79.7 FIBROMYALGIA: ICD-10-CM

## 2020-11-26 DIAGNOSIS — M54.50 LUMBAR PAIN WITH RADIATION DOWN LEG: ICD-10-CM

## 2020-11-30 DIAGNOSIS — M79.606 LUMBAR PAIN WITH RADIATION DOWN LEG: ICD-10-CM

## 2020-11-30 DIAGNOSIS — M79.7 FIBROMYALGIA: ICD-10-CM

## 2020-11-30 DIAGNOSIS — M54.50 LUMBAR PAIN WITH RADIATION DOWN LEG: ICD-10-CM

## 2020-11-30 RX ORDER — CYCLOBENZAPRINE HCL 10 MG
10 TABLET ORAL 3 TIMES DAILY PRN
Qty: 90 TABLET | Refills: 0 | Status: SHIPPED | OUTPATIENT
Start: 2020-11-30 | End: 2020-12-26 | Stop reason: SDUPTHER

## 2020-11-30 RX ORDER — PREGABALIN 75 MG/1
75 CAPSULE ORAL 2 TIMES DAILY
Qty: 60 CAPSULE | Refills: 0 | Status: SHIPPED | OUTPATIENT
Start: 2020-11-30 | End: 2020-12-26 | Stop reason: SDUPTHER

## 2020-11-30 RX ORDER — PREGABALIN 75 MG/1
75 CAPSULE ORAL 2 TIMES DAILY
Qty: 60 CAPSULE | Refills: 0 | OUTPATIENT
Start: 2020-11-30

## 2020-11-30 RX ORDER — CYCLOBENZAPRINE HCL 10 MG
10 TABLET ORAL 3 TIMES DAILY PRN
Qty: 90 TABLET | Refills: 0 | OUTPATIENT
Start: 2020-11-30

## 2020-12-02 ENCOUNTER — HOSPITAL ENCOUNTER (EMERGENCY)
Facility: HOSPITAL | Age: 40
Discharge: HOME | End: 2020-12-02
Attending: EMERGENCY MEDICINE
Payer: COMMERCIAL

## 2020-12-02 VITALS
DIASTOLIC BLOOD PRESSURE: 91 MMHG | WEIGHT: 125.66 LBS | HEART RATE: 110 BPM | BODY MASS INDEX: 22.26 KG/M2 | TEMPERATURE: 97.6 F | SYSTOLIC BLOOD PRESSURE: 146 MMHG | RESPIRATION RATE: 18 BRPM | OXYGEN SATURATION: 97 %

## 2020-12-02 DIAGNOSIS — R25.2 SPASM: Primary | ICD-10-CM

## 2020-12-02 LAB
ALBUMIN SERPL BCP-MCNC: 3.8 G/DL (ref 3.5–5)
ALP SERPL-CCNC: 40 U/L (ref 46–116)
ALT SERPL W P-5'-P-CCNC: 43 U/L (ref 12–78)
ANION GAP SERPL CALCULATED.3IONS-SCNC: 10 MMOL/L (ref 4–13)
AST SERPL W P-5'-P-CCNC: 29 U/L (ref 5–45)
BASOPHILS # BLD AUTO: 0.04 THOUSANDS/ΜL (ref 0–0.1)
BASOPHILS NFR BLD AUTO: 1 % (ref 0–1)
BILIRUB SERPL-MCNC: 0.45 MG/DL (ref 0.2–1)
BUN SERPL-MCNC: 9 MG/DL (ref 5–25)
CALCIUM SERPL-MCNC: 9.1 MG/DL (ref 8.3–10.1)
CHLORIDE SERPL-SCNC: 103 MMOL/L (ref 100–108)
CO2 SERPL-SCNC: 26 MMOL/L (ref 21–32)
CREAT SERPL-MCNC: 0.8 MG/DL (ref 0.6–1.3)
EOSINOPHIL # BLD AUTO: 0.13 THOUSAND/ΜL (ref 0–0.61)
EOSINOPHIL NFR BLD AUTO: 2 % (ref 0–6)
ERYTHROCYTE [DISTWIDTH] IN BLOOD BY AUTOMATED COUNT: 12.6 % (ref 11.6–15.1)
GFR SERPL CREATININE-BSD FRML MDRD: 94 ML/MIN/1.73SQ M
GLUCOSE SERPL-MCNC: 116 MG/DL (ref 65–140)
HCT VFR BLD AUTO: 40.2 % (ref 34.8–46.1)
HGB BLD-MCNC: 13.2 G/DL (ref 11.5–15.4)
IMM GRANULOCYTES # BLD AUTO: 0.02 THOUSAND/UL (ref 0–0.2)
IMM GRANULOCYTES NFR BLD AUTO: 0 % (ref 0–2)
LYMPHOCYTES # BLD AUTO: 1.81 THOUSANDS/ΜL (ref 0.6–4.47)
LYMPHOCYTES NFR BLD AUTO: 26 % (ref 14–44)
MCH RBC QN AUTO: 29.9 PG (ref 26.8–34.3)
MCHC RBC AUTO-ENTMCNC: 32.8 G/DL (ref 31.4–37.4)
MCV RBC AUTO: 91 FL (ref 82–98)
MONOCYTES # BLD AUTO: 0.43 THOUSAND/ΜL (ref 0.17–1.22)
MONOCYTES NFR BLD AUTO: 6 % (ref 4–12)
NEUTROPHILS # BLD AUTO: 4.52 THOUSANDS/ΜL (ref 1.85–7.62)
NEUTS SEG NFR BLD AUTO: 65 % (ref 43–75)
NRBC BLD AUTO-RTO: 0 /100 WBCS
PLATELET # BLD AUTO: 198 THOUSANDS/UL (ref 149–390)
PMV BLD AUTO: 10 FL (ref 8.9–12.7)
POTASSIUM SERPL-SCNC: 3.3 MMOL/L (ref 3.5–5.3)
PROT SERPL-MCNC: 7.1 G/DL (ref 6.4–8.2)
RBC # BLD AUTO: 4.41 MILLION/UL (ref 3.81–5.12)
SODIUM SERPL-SCNC: 139 MMOL/L (ref 136–145)
WBC # BLD AUTO: 6.95 THOUSAND/UL (ref 4.31–10.16)

## 2020-12-02 PROCEDURE — 99284 EMERGENCY DEPT VISIT MOD MDM: CPT | Performed by: EMERGENCY MEDICINE

## 2020-12-02 PROCEDURE — 96360 HYDRATION IV INFUSION INIT: CPT

## 2020-12-02 PROCEDURE — 85025 COMPLETE CBC W/AUTO DIFF WBC: CPT | Performed by: PHYSICIAN ASSISTANT

## 2020-12-02 PROCEDURE — 99284 EMERGENCY DEPT VISIT MOD MDM: CPT

## 2020-12-02 PROCEDURE — 80053 COMPREHEN METABOLIC PANEL: CPT | Performed by: PHYSICIAN ASSISTANT

## 2020-12-02 PROCEDURE — 36415 COLL VENOUS BLD VENIPUNCTURE: CPT | Performed by: PHYSICIAN ASSISTANT

## 2020-12-02 RX ORDER — DIAZEPAM 5 MG/1
5 TABLET ORAL ONCE
Status: COMPLETED | OUTPATIENT
Start: 2020-12-02 | End: 2020-12-02

## 2020-12-02 RX ORDER — DIAZEPAM 5 MG/1
5 TABLET ORAL ONCE
Status: DISCONTINUED | OUTPATIENT
Start: 2020-12-02 | End: 2020-12-02 | Stop reason: HOSPADM

## 2020-12-02 RX ADMIN — DIAZEPAM 5 MG: 5 TABLET ORAL at 16:13

## 2020-12-02 RX ADMIN — SODIUM CHLORIDE 1000 ML: 0.9 INJECTION, SOLUTION INTRAVENOUS at 16:18

## 2020-12-03 ENCOUNTER — TELEPHONE (OUTPATIENT)
Dept: NEUROLOGY | Facility: CLINIC | Age: 40
End: 2020-12-03

## 2020-12-03 ENCOUNTER — OFFICE VISIT (OUTPATIENT)
Dept: NEUROLOGY | Facility: CLINIC | Age: 40
End: 2020-12-03
Payer: COMMERCIAL

## 2020-12-03 VITALS
HEART RATE: 65 BPM | BODY MASS INDEX: 23.57 KG/M2 | WEIGHT: 133 LBS | HEIGHT: 63 IN | DIASTOLIC BLOOD PRESSURE: 82 MMHG | SYSTOLIC BLOOD PRESSURE: 120 MMHG

## 2020-12-03 DIAGNOSIS — M54.50 LOW BACK PAIN, UNSPECIFIED BACK PAIN LATERALITY, UNSPECIFIED CHRONICITY, UNSPECIFIED WHETHER SCIATICA PRESENT: ICD-10-CM

## 2020-12-03 DIAGNOSIS — B18.2 CHRONIC HEPATITIS C WITHOUT HEPATIC COMA (HCC): ICD-10-CM

## 2020-12-03 DIAGNOSIS — R25.1 TREMOR: ICD-10-CM

## 2020-12-03 DIAGNOSIS — R25.9 ABNORMAL MOVEMENTS: Primary | ICD-10-CM

## 2020-12-03 DIAGNOSIS — R25.2 CRAMP OF LIMB: ICD-10-CM

## 2020-12-03 PROCEDURE — 1111F DSCHRG MED/CURRENT MED MERGE: CPT | Performed by: PSYCHIATRY & NEUROLOGY

## 2020-12-03 PROCEDURE — 99215 OFFICE O/P EST HI 40 MIN: CPT | Performed by: PSYCHIATRY & NEUROLOGY

## 2020-12-03 PROCEDURE — 4004F PT TOBACCO SCREEN RCVD TLK: CPT | Performed by: PSYCHIATRY & NEUROLOGY

## 2020-12-03 PROCEDURE — 3008F BODY MASS INDEX DOCD: CPT | Performed by: PSYCHIATRY & NEUROLOGY

## 2020-12-04 ENCOUNTER — TELEPHONE (OUTPATIENT)
Dept: FAMILY MEDICINE CLINIC | Facility: CLINIC | Age: 40
End: 2020-12-04

## 2020-12-04 DIAGNOSIS — R25.2 CRAMP OF LIMB: Primary | ICD-10-CM

## 2020-12-04 DIAGNOSIS — M54.50 LOW BACK PAIN, UNSPECIFIED BACK PAIN LATERALITY, UNSPECIFIED CHRONICITY, UNSPECIFIED WHETHER SCIATICA PRESENT: ICD-10-CM

## 2020-12-26 DIAGNOSIS — M79.7 FIBROMYALGIA: ICD-10-CM

## 2020-12-26 DIAGNOSIS — M79.606 LUMBAR PAIN WITH RADIATION DOWN LEG: ICD-10-CM

## 2020-12-26 DIAGNOSIS — M54.50 LUMBAR PAIN WITH RADIATION DOWN LEG: ICD-10-CM

## 2020-12-28 RX ORDER — CYCLOBENZAPRINE HCL 10 MG
10 TABLET ORAL 3 TIMES DAILY PRN
Qty: 90 TABLET | Refills: 0 | Status: SHIPPED | OUTPATIENT
Start: 2020-12-28 | End: 2021-01-23 | Stop reason: SDUPTHER

## 2020-12-28 RX ORDER — PREGABALIN 75 MG/1
75 CAPSULE ORAL 2 TIMES DAILY
Qty: 60 CAPSULE | Refills: 0 | Status: SHIPPED | OUTPATIENT
Start: 2020-12-28 | End: 2021-01-23 | Stop reason: SDUPTHER

## 2020-12-31 ENCOUNTER — TELEMEDICINE (OUTPATIENT)
Dept: FAMILY MEDICINE CLINIC | Facility: CLINIC | Age: 40
End: 2020-12-31

## 2020-12-31 DIAGNOSIS — F33.1 MODERATE EPISODE OF RECURRENT MAJOR DEPRESSIVE DISORDER (HCC): ICD-10-CM

## 2020-12-31 DIAGNOSIS — F32.1 CURRENT MODERATE EPISODE OF MAJOR DEPRESSIVE DISORDER, UNSPECIFIED WHETHER RECURRENT (HCC): Primary | ICD-10-CM

## 2020-12-31 PROBLEM — F32.A DEPRESSION: Status: ACTIVE | Noted: 2020-12-31

## 2020-12-31 PROBLEM — F33.2 SEVERE EPISODE OF RECURRENT MAJOR DEPRESSIVE DISORDER, WITHOUT PSYCHOTIC FEATURES (HCC): Status: ACTIVE | Noted: 2020-12-31

## 2020-12-31 PROBLEM — F33.2 SEVERE EPISODE OF RECURRENT MAJOR DEPRESSIVE DISORDER, WITHOUT PSYCHOTIC FEATURES (HCC): Status: RESOLVED | Noted: 2020-12-31 | Resolved: 2020-12-31

## 2020-12-31 PROBLEM — F32.A DEPRESSION: Status: RESOLVED | Noted: 2020-12-31 | Resolved: 2020-12-31

## 2020-12-31 PROCEDURE — G2012 BRIEF CHECK IN BY MD/QHP: HCPCS | Performed by: NURSE PRACTITIONER

## 2020-12-31 RX ORDER — ESCITALOPRAM OXALATE 10 MG/1
10 TABLET ORAL DAILY
Qty: 30 TABLET | Refills: 0 | Status: SHIPPED | OUTPATIENT
Start: 2020-12-31 | End: 2021-02-23 | Stop reason: SDUPTHER

## 2021-01-23 DIAGNOSIS — M54.50 LUMBAR PAIN WITH RADIATION DOWN LEG: ICD-10-CM

## 2021-01-23 DIAGNOSIS — M79.7 FIBROMYALGIA: ICD-10-CM

## 2021-01-23 DIAGNOSIS — M79.606 LUMBAR PAIN WITH RADIATION DOWN LEG: ICD-10-CM

## 2021-01-25 ENCOUNTER — TELEPHONE (OUTPATIENT)
Dept: FAMILY MEDICINE CLINIC | Facility: CLINIC | Age: 41
End: 2021-01-25

## 2021-01-25 DIAGNOSIS — M54.50 LUMBAR PAIN WITH RADIATION DOWN LEG: ICD-10-CM

## 2021-01-25 DIAGNOSIS — M79.7 FIBROMYALGIA: ICD-10-CM

## 2021-01-25 DIAGNOSIS — M79.606 LUMBAR PAIN WITH RADIATION DOWN LEG: ICD-10-CM

## 2021-01-25 RX ORDER — PREGABALIN 75 MG/1
75 CAPSULE ORAL 2 TIMES DAILY
Qty: 60 CAPSULE | Refills: 0 | Status: SHIPPED | OUTPATIENT
Start: 2021-01-25 | End: 2021-02-23 | Stop reason: SDUPTHER

## 2021-01-25 RX ORDER — PREGABALIN 75 MG/1
75 CAPSULE ORAL 2 TIMES DAILY
Qty: 60 CAPSULE | Refills: 0 | Status: SHIPPED | OUTPATIENT
Start: 2021-01-25 | End: 2021-01-25 | Stop reason: SDUPTHER

## 2021-01-25 RX ORDER — CYCLOBENZAPRINE HCL 10 MG
10 TABLET ORAL 3 TIMES DAILY PRN
Qty: 90 TABLET | Refills: 0 | Status: SHIPPED | OUTPATIENT
Start: 2021-01-25 | End: 2021-02-23 | Stop reason: SDUPTHER

## 2021-01-25 RX ORDER — CYCLOBENZAPRINE HCL 10 MG
10 TABLET ORAL 3 TIMES DAILY PRN
Qty: 90 TABLET | Refills: 0 | Status: SHIPPED | OUTPATIENT
Start: 2021-01-25 | End: 2021-01-25 | Stop reason: SDUPTHER

## 2021-01-25 NOTE — TELEPHONE ENCOUNTER
Patient is calling to request a pharmacy change for the medications that were filled today      pregabalin (LYRICA) 75 mg capsule  cyclobenzaprine (FLEXERIL) 10 mg tablet       If this medication can please be sent over to [facility] in [place], [address],   [phone] PHONE      Thank you

## 2021-02-01 ENCOUNTER — TELEPHONE (OUTPATIENT)
Dept: FAMILY MEDICINE CLINIC | Facility: CLINIC | Age: 41
End: 2021-02-01

## 2021-02-01 DIAGNOSIS — Z23 ENCOUNTER FOR IMMUNIZATION: ICD-10-CM

## 2021-02-08 ENCOUNTER — TELEPHONE (OUTPATIENT)
Dept: PULMONOLOGY | Facility: CLINIC | Age: 41
End: 2021-02-08

## 2021-02-08 DIAGNOSIS — J45.40 MODERATE PERSISTENT ASTHMA WITHOUT COMPLICATION: ICD-10-CM

## 2021-02-08 RX ORDER — ALBUTEROL SULFATE 90 UG/1
1 AEROSOL, METERED RESPIRATORY (INHALATION) EVERY 4 HOURS PRN
Qty: 18 INHALER | Refills: 3 | Status: SHIPPED | OUTPATIENT
Start: 2021-02-08

## 2021-02-17 DIAGNOSIS — J45.40 MODERATE PERSISTENT ASTHMA WITHOUT COMPLICATION: ICD-10-CM

## 2021-02-17 RX ORDER — BUDESONIDE AND FORMOTEROL FUMARATE DIHYDRATE 160; 4.5 UG/1; UG/1
2 AEROSOL RESPIRATORY (INHALATION) 2 TIMES DAILY
Qty: 10.2 INHALER | Refills: 5 | Status: SHIPPED | OUTPATIENT
Start: 2021-02-17 | End: 2021-03-15

## 2021-02-23 DIAGNOSIS — M79.7 FIBROMYALGIA: ICD-10-CM

## 2021-02-23 DIAGNOSIS — J45.40 MODERATE PERSISTENT ASTHMA WITHOUT COMPLICATION: ICD-10-CM

## 2021-02-23 DIAGNOSIS — M79.606 LUMBAR PAIN WITH RADIATION DOWN LEG: ICD-10-CM

## 2021-02-23 DIAGNOSIS — F32.1 CURRENT MODERATE EPISODE OF MAJOR DEPRESSIVE DISORDER, UNSPECIFIED WHETHER RECURRENT (HCC): ICD-10-CM

## 2021-02-23 DIAGNOSIS — M54.50 LUMBAR PAIN WITH RADIATION DOWN LEG: ICD-10-CM

## 2021-02-23 RX ORDER — BUDESONIDE AND FORMOTEROL FUMARATE DIHYDRATE 160; 4.5 UG/1; UG/1
2 AEROSOL RESPIRATORY (INHALATION) 2 TIMES DAILY
Qty: 10.2 INHALER | Refills: 0 | Status: CANCELLED | OUTPATIENT
Start: 2021-02-23

## 2021-02-23 RX ORDER — ALBUTEROL SULFATE 90 UG/1
1 AEROSOL, METERED RESPIRATORY (INHALATION) EVERY 4 HOURS PRN
Qty: 18 INHALER | Refills: 0 | Status: CANCELLED | OUTPATIENT
Start: 2021-02-23

## 2021-02-24 DIAGNOSIS — M79.7 FIBROMYALGIA: ICD-10-CM

## 2021-02-24 RX ORDER — CYCLOBENZAPRINE HCL 10 MG
10 TABLET ORAL 3 TIMES DAILY PRN
Qty: 90 TABLET | Refills: 0 | Status: SHIPPED | OUTPATIENT
Start: 2021-02-24 | End: 2021-04-20 | Stop reason: SDUPTHER

## 2021-02-24 RX ORDER — PREGABALIN 75 MG/1
75 CAPSULE ORAL 2 TIMES DAILY
Qty: 60 CAPSULE | Refills: 0 | Status: SHIPPED | OUTPATIENT
Start: 2021-02-24 | End: 2021-03-05

## 2021-02-24 RX ORDER — PREGABALIN 75 MG/1
CAPSULE ORAL
Qty: 60 CAPSULE | OUTPATIENT
Start: 2021-02-24

## 2021-02-24 RX ORDER — ESCITALOPRAM OXALATE 10 MG/1
10 TABLET ORAL DAILY
Qty: 30 TABLET | Refills: 0 | Status: SHIPPED | OUTPATIENT
Start: 2021-02-24 | End: 2021-04-20 | Stop reason: SDUPTHER

## 2021-03-05 ENCOUNTER — TELEMEDICINE (OUTPATIENT)
Dept: FAMILY MEDICINE CLINIC | Facility: CLINIC | Age: 41
End: 2021-03-05

## 2021-03-05 DIAGNOSIS — M79.7 FIBROMYALGIA: ICD-10-CM

## 2021-03-05 PROCEDURE — 99442 PR PHYS/QHP TELEPHONE EVALUATION 11-20 MIN: CPT | Performed by: NURSE PRACTITIONER

## 2021-03-05 RX ORDER — PREGABALIN 75 MG/1
75 CAPSULE ORAL DAILY
Qty: 11 CAPSULE | Refills: 0 | Status: SHIPPED | OUTPATIENT
Start: 2021-03-05 | End: 2021-03-08 | Stop reason: SDUPTHER

## 2021-03-05 NOTE — PROGRESS NOTES
Virtual Brief Visit    Assessment/Plan:    Problem List Items Addressed This Visit     None      Visit Diagnoses     Fibromyalgia        Relevant Medications    pregabalin (LYRICA) 75 mg capsule        We will do taper for the next 2 weeks and stop Lyrica  75 mg daily x 7 days, 75 mg every other day x 7 days and then stop  Patient was sent a prescription for 11 Lyrica pills which is exactly enough for the discussed taper  No additional refills will be given  Reason for visit is   Chief Complaint   Patient presents with    Virtual Brief Visit        Encounter provider Dipak Fields    Provider located at [address] [address] [phone]    Recent Visits  No visits were found meeting these conditions  Showing recent visits within past 7 days and meeting all other requirements     Today's Visits  Date Type Provider Dept   03/05/21 Telemedicine Ramez Fields 50 today's visits and meeting all other requirements     Future Appointments  No visits were found meeting these conditions  Showing future appointments within next 150 days and meeting all other requirements        After connecting through telephone, the patient was identified by name and date of birth  Fernando Renteria was informed that this is a telemedicine visit and that the visit is being conducted through [facility] and patient was informed that this is a secure, HIPAA-compliant platform  She agrees to proceed     My office door was closed  No one else was in the room  She acknowledged consent and understanding of privacy and security of the platform  The patient has agreed to participate and understands she can discontinue the visit at any time  It was my intention to perform this visit via video technology but the patient was not able to do a video connection so the visit was completed via telephone audio only        Patient is aware this is a billable service  Subjective    July Puente is a 47 y o  female who presents today for this virtual visit to discuss weaning off of Lyrica  Patient was started on Lyrica several months ago for fibromyalgia  Patient revealed that she has been abusing the Lyrica  Her dose is 75 milligrams b i d  for a total of 150 milligrams daily  Patient reports that she has been taking 6 capsules daily which is a total of 450 milligrams daily  Patient totally out of medication at this time  Has been out for several days  Would like to discuss a weaning schedule to prevent severe withdrawal symptoms        Past Medical History:   Diagnosis Date    Anemia     Anxiety     Arthritis     Asthma     Coronary artery disease     Disease of thyroid gland     GERD (gastroesophageal reflux disease)     History of heroin use     herion last used 3 5 years     Hypertension     Hypothyroidism     Infectious viral hepatitis     Mitral valve regurgitation     Myocardial infarction (HCC)     Pneumonia     RA [facility]rheumatoid arthritis) ([facility] [address] )     Tobacco abuse     Urinary tract infection        Past Surgical History:   Procedure Laterality Date    APPENDECTOMY      OVARIAN CYST REMOVAL         Current Outpatient Medications   Medication Sig Dispense Refill    albuterol (PROVENTIL HFA,VENTOLIN HFA) 90 mcg/act inhaler Inhale 1 puff every 4 (four) hours as needed for wheezing 18 Inhaler 3    celecoxib (CeleBREX) 100 mg capsule Take 1 capsule (100 mg total) by mouth 2 (two) times a day 60 capsule 6    cyclobenzaprine (FLEXERIL) 10 mg tablet Take 1 tablet (10 mg total) by mouth 3 (three) times a day as needed for muscle spasms 90 tablet 0    escitalopram (LEXAPRO) 10 mg tablet Take 1 tablet (10 mg total) by mouth daily Take 1/2 pill for first 7 days then take one pill daily 30 tablet 0    ipratropium-albuterol (DUO-NEB) 0 5-2 5 mg/3 mL nebulizer solution Take 1 vial (3 mL total) by nebulization 4 (four) times a day 50 vial 3    loratadine (CLARITIN) 10 mg tablet TAKE 1 TABLET (10 MG TOTAL) BY MOUTH DAILY (Patient taking differently: Take 10 mg by mouth daily as needed ) 30 tablet 5    montelukast (SINGULAIR) 10 mg tablet TAKE 1 TABLET (10 MG TOTAL) BY MOUTH DAILY AT BEDTIME 90 tablet 1    nystatin (MYCOSTATIN) 500,000 units/5 mL suspension Apply 5 mL (500,000 Units total) to the mouth or throat 4 (four) times a day 473 mL 1    omeprazole (PriLOSEC) 40 MG capsule TAKE 1 CAPSULE (40 MG TOTAL) BY MOUTH DAILY 90 capsule 1    pregabalin (LYRICA) 75 mg capsule Take 1 capsule (75 mg total) by mouth daily 1 pill per day x 7 days then 1 pill every other day x 7 days then STOP 11 capsule 0    SUMAtriptan (IMITREX) 50 mg tablet Take 1 tablet (50 mg total) by mouth once as needed for migraine 9 tablet 0    Symbicort 160-4 5 MCG/ACT inhaler Inhale 2 puffs 2 (two) times a day Rinse mouth after use  10 2 Inhaler 5    valACYclovir (VALTREX) 500 mg tablet Take 1 tablet (500 mg total) by mouth daily for 90 doses 90 tablet 0    varenicline (CHANTIX COLLIN) 0 5 MG X 11 & 1 MG X 42 tablet Take one 0 5mg tab by mouth 1x daily for 3 days, then increase to one 0 5mg tab 2x daily for 3 days, then increase to one 1mg tab 2x daily (Patient not taking: Reported on 12/3/2020) 53 tablet 0     No current facility-administered medications for this visit  Allergies   Allergen Reactions    Ativan [Lorazepam]     Coconut Oil      coconut    Nuts     Penicillins     Shellfish-Derived Products     Tegretol [Carbamazepine]        Review of Systems   Constitutional: Negative for chills and fever  HENT: Negative for ear pain and sore throat  Eyes: Negative for pain and visual disturbance  Respiratory: Negative for cough and shortness of breath  Cardiovascular: Negative for chest pain and palpitations  Gastrointestinal: Negative for abdominal pain and vomiting  Genitourinary: Negative for dysuria and hematuria  Musculoskeletal: Negative for arthralgias and back pain  Skin: Negative for color change and rash  Neurological: Negative for seizures and syncope  All other systems reviewed and are negative  There were no vitals filed for this visit  I spent 15 minutes directly with the patient during this visit    VIRTUAL VISIT DISCLAIMER    Sarahi Jesusanastasiia acknowledges that she has consented to an online visit or consultation  She understands that the online visit is based solely on information provided by her, and that, in the absence of a face-to-face physical evaluation by the physician, the diagnosis she receives is both limited and provisional in terms of accuracy and completeness  This is not intended to replace a full medical face-to-face evaluation by the physician  Sarahi Washington understands and accepts these terms

## 2021-03-08 ENCOUNTER — TELEPHONE (OUTPATIENT)
Dept: FAMILY MEDICINE CLINIC | Facility: CLINIC | Age: 41
End: 2021-03-08

## 2021-03-08 DIAGNOSIS — M79.7 FIBROMYALGIA: ICD-10-CM

## 2021-03-08 RX ORDER — PREGABALIN 75 MG/1
75 CAPSULE ORAL DAILY
Qty: 11 CAPSULE | Refills: 0 | Status: SHIPPED | OUTPATIENT
Start: 2021-03-08 | End: 2022-07-13

## 2021-03-08 NOTE — TELEPHONE ENCOUNTER
Pt called in very upset that her medication was sent to [facility] pharmacy, pt states that she has been using the [facility] in [facility] for 2 months now, pt would like her meds sent as soon as possible        Please advise

## 2021-03-09 ENCOUNTER — TELEPHONE (OUTPATIENT)
Dept: FAMILY MEDICINE CLINIC | Facility: CLINIC | Age: 41
End: 2021-03-09

## 2021-03-09 NOTE — TELEPHONE ENCOUNTER
Patient is calling about pregabalin (LYRICA) 75 mg capsule   It was sent over to the right pharmacy but the pharmacy is stating that the Dr needs to contact the pharmacy and explain that the patient is okay to have it   Pharmacy # [phone]    Any questions please contact the patient         Thank you,

## 2021-03-14 DIAGNOSIS — J45.40 MODERATE PERSISTENT ASTHMA WITHOUT COMPLICATION: ICD-10-CM

## 2021-03-15 RX ORDER — BUDESONIDE AND FORMOTEROL FUMARATE DIHYDRATE 160; 4.5 UG/1; UG/1
AEROSOL RESPIRATORY (INHALATION)
Qty: 10.2 G | Refills: 0 | Status: SHIPPED | OUTPATIENT
Start: 2021-03-15 | End: 2022-07-13

## 2021-03-16 NOTE — TELEPHONE ENCOUNTER
Patient aware that Dr Jc Velasquez would like an in person visit not virtual appt  Patient declined follow up for now " I will figure it out or will have to find a new provider in my area"

## 2021-04-02 ENCOUNTER — TELEPHONE (OUTPATIENT)
Dept: NEUROLOGY | Facility: CLINIC | Age: 41
End: 2021-04-02

## 2021-04-06 NOTE — TELEPHONE ENCOUNTER
2nd attempt    LMOM to r/s  [facility] 4/12  1p  [facility]     moved away      Please c/b asap    Gave T4 sched ext

## 2021-04-20 DIAGNOSIS — M79.606 LUMBAR PAIN WITH RADIATION DOWN LEG: ICD-10-CM

## 2021-04-20 DIAGNOSIS — B00.1 RECURRENT HERPES LABIALIS: ICD-10-CM

## 2021-04-20 DIAGNOSIS — F32.1 CURRENT MODERATE EPISODE OF MAJOR DEPRESSIVE DISORDER, UNSPECIFIED WHETHER RECURRENT (HCC): ICD-10-CM

## 2021-04-20 DIAGNOSIS — M54.50 LUMBAR PAIN WITH RADIATION DOWN LEG: ICD-10-CM

## 2021-04-20 DIAGNOSIS — J45.40 MODERATE PERSISTENT ASTHMA WITHOUT COMPLICATION: ICD-10-CM

## 2021-04-20 RX ORDER — BUDESONIDE AND FORMOTEROL FUMARATE DIHYDRATE 160; 4.5 UG/1; UG/1
2 AEROSOL RESPIRATORY (INHALATION) 2 TIMES DAILY
Qty: 10.2 G | Refills: 0 | Status: CANCELLED | OUTPATIENT
Start: 2021-04-20

## 2021-04-20 RX ORDER — ALBUTEROL SULFATE 90 UG/1
1 AEROSOL, METERED RESPIRATORY (INHALATION) EVERY 4 HOURS PRN
Qty: 18 INHALER | Refills: 0 | Status: CANCELLED | OUTPATIENT
Start: 2021-04-20

## 2021-04-21 RX ORDER — VALACYCLOVIR HYDROCHLORIDE 500 MG/1
500 TABLET, FILM COATED ORAL DAILY
Qty: 90 TABLET | Refills: 0 | Status: SHIPPED | OUTPATIENT
Start: 2021-04-21 | End: 2021-06-08 | Stop reason: SDUPTHER

## 2021-04-21 RX ORDER — ESCITALOPRAM OXALATE 10 MG/1
10 TABLET ORAL DAILY
Qty: 30 TABLET | Refills: 0 | Status: SHIPPED | OUTPATIENT
Start: 2021-04-21 | End: 2021-06-08 | Stop reason: SDUPTHER

## 2021-04-21 RX ORDER — CYCLOBENZAPRINE HCL 10 MG
10 TABLET ORAL 3 TIMES DAILY PRN
Qty: 90 TABLET | Refills: 0 | Status: SHIPPED | OUTPATIENT
Start: 2021-04-21 | End: 2021-06-08 | Stop reason: SDUPTHER

## 2021-06-08 DIAGNOSIS — F32.1 CURRENT MODERATE EPISODE OF MAJOR DEPRESSIVE DISORDER, UNSPECIFIED WHETHER RECURRENT (HCC): ICD-10-CM

## 2021-06-08 DIAGNOSIS — Z72.0 TOBACCO ABUSE: ICD-10-CM

## 2021-06-08 DIAGNOSIS — M79.606 LUMBAR PAIN WITH RADIATION DOWN LEG: ICD-10-CM

## 2021-06-08 DIAGNOSIS — J45.40 MODERATE PERSISTENT ASTHMA WITHOUT COMPLICATION: ICD-10-CM

## 2021-06-08 DIAGNOSIS — B00.1 RECURRENT HERPES LABIALIS: ICD-10-CM

## 2021-06-08 DIAGNOSIS — M54.50 LUMBAR PAIN WITH RADIATION DOWN LEG: ICD-10-CM

## 2021-06-08 RX ORDER — ALBUTEROL SULFATE 90 UG/1
1 AEROSOL, METERED RESPIRATORY (INHALATION) EVERY 4 HOURS PRN
Refills: 0 | Status: CANCELLED | OUTPATIENT
Start: 2021-06-08

## 2021-06-08 RX ORDER — ESCITALOPRAM OXALATE 10 MG/1
10 TABLET ORAL DAILY
Qty: 30 TABLET | Refills: 0 | Status: SHIPPED | OUTPATIENT
Start: 2021-06-08

## 2021-06-08 RX ORDER — VARENICLINE TARTRATE 25 MG
KIT ORAL
Qty: 53 TABLET | Refills: 0 | Status: SHIPPED | OUTPATIENT
Start: 2021-06-08 | End: 2022-07-13

## 2021-06-08 RX ORDER — CYCLOBENZAPRINE HCL 10 MG
10 TABLET ORAL 3 TIMES DAILY PRN
Qty: 90 TABLET | Refills: 0 | Status: SHIPPED | OUTPATIENT
Start: 2021-06-08 | End: 2022-07-13

## 2021-06-08 RX ORDER — VALACYCLOVIR HYDROCHLORIDE 500 MG/1
500 TABLET, FILM COATED ORAL DAILY
Qty: 90 TABLET | Refills: 0 | Status: SHIPPED | OUTPATIENT
Start: 2021-06-08 | End: 2022-07-13

## 2021-06-10 NOTE — TELEPHONE ENCOUNTER
SECOND ATTEMPT:    Called PT again today 06/10/21, and still NO ANSWER  Tried to leave a Message again, but this time I couldn't  WILL TRY AGAIN TOMORROW 06/11/21

## 2021-06-11 NOTE — TELEPHONE ENCOUNTER
THIRD AND FINAL ATTEMPT:    Called PT once again and Still NO ANSWER  Left the PT a Message once Again of the Reason of my / our Call  Needless to say, a Letter will be sent to the PT to contact the Office to Schedule an Appt

## 2021-07-15 NOTE — ASSESSMENT & PLAN NOTE
The patient is a 28-year-old female who Fredo from prior evaluation comes in now for follow-up. She notes that her breathing is. But she does not want to use her supplemental oxygen and states that she does not know how to use it.   She has equipment Unknown etiology at this  Likely to be secondary to thyroid dysfunction  Has history of thyroid disease but unknown if his hyper or hypo thyroidism  Will check TSH, CBC and CMP    Could also be secondary to other endocrine disorders, or malignancy in light of hot flashes, smoker,  weight loss early history of Chronic obstructive pulmonary disease

## 2022-07-13 ENCOUNTER — APPOINTMENT (EMERGENCY)
Dept: CT IMAGING | Facility: HOSPITAL | Age: 42
End: 2022-07-13
Payer: COMMERCIAL

## 2022-07-13 ENCOUNTER — HOSPITAL ENCOUNTER (EMERGENCY)
Facility: HOSPITAL | Age: 42
Discharge: HOME | End: 2022-07-13
Attending: EMERGENCY MEDICINE
Payer: COMMERCIAL

## 2022-07-13 VITALS
SYSTOLIC BLOOD PRESSURE: 136 MMHG | HEART RATE: 96 BPM | DIASTOLIC BLOOD PRESSURE: 91 MMHG | WEIGHT: 120 LBS | RESPIRATION RATE: 18 BRPM | TEMPERATURE: 98.1 F | HEIGHT: 63 IN | BODY MASS INDEX: 21.26 KG/M2 | OXYGEN SATURATION: 97 %

## 2022-07-13 DIAGNOSIS — R11.2 NAUSEA AND VOMITING: ICD-10-CM

## 2022-07-13 DIAGNOSIS — R10.9 ABDOMINAL PAIN: Primary | ICD-10-CM

## 2022-07-13 DIAGNOSIS — E87.6 HYPOKALEMIA: ICD-10-CM

## 2022-07-13 DIAGNOSIS — R31.9 HEMATURIA: ICD-10-CM

## 2022-07-13 DIAGNOSIS — K52.9 ENTERITIS: ICD-10-CM

## 2022-07-13 LAB
ALBUMIN SERPL BCP-MCNC: 4.1 G/DL (ref 3.5–5)
ALP SERPL-CCNC: 45 U/L (ref 46–116)
ALT SERPL W P-5'-P-CCNC: 17 U/L (ref 12–78)
ANION GAP SERPL CALCULATED.3IONS-SCNC: 11 MMOL/L (ref 4–13)
AST SERPL W P-5'-P-CCNC: 18 U/L (ref 5–45)
BACTERIA UR QL AUTO: ABNORMAL /HPF
BASOPHILS # BLD AUTO: 0.08 THOUSANDS/ΜL (ref 0–0.1)
BASOPHILS NFR BLD AUTO: 1 % (ref 0–1)
BILIRUB SERPL-MCNC: 0.3 MG/DL (ref 0.2–1)
BILIRUB UR QL STRIP: NEGATIVE
BUN SERPL-MCNC: 14 MG/DL (ref 5–25)
CALCIUM SERPL-MCNC: 8.6 MG/DL (ref 8.3–10.1)
CHLORIDE SERPL-SCNC: 106 MMOL/L (ref 100–108)
CLARITY UR: CLEAR
CO2 SERPL-SCNC: 23 MMOL/L (ref 21–32)
COLOR UR: YELLOW
CREAT SERPL-MCNC: 0.92 MG/DL (ref 0.6–1.3)
EOSINOPHIL # BLD AUTO: 0.27 THOUSAND/ΜL (ref 0–0.61)
EOSINOPHIL NFR BLD AUTO: 2 % (ref 0–6)
ERYTHROCYTE [DISTWIDTH] IN BLOOD BY AUTOMATED COUNT: 14.8 % (ref 11.6–15.1)
EXT PREG TEST URINE: NEGATIVE
EXT. CONTROL ED NAV: NORMAL
GFR SERPL CREATININE-BSD FRML MDRD: 78 ML/MIN/1.73SQ M
GLUCOSE SERPL-MCNC: 101 MG/DL (ref 65–140)
GLUCOSE UR STRIP-MCNC: NEGATIVE MG/DL
HCT VFR BLD AUTO: 36.3 % (ref 34.8–46.1)
HGB BLD-MCNC: 11.8 G/DL (ref 11.5–15.4)
HGB UR QL STRIP.AUTO: ABNORMAL
IMM GRANULOCYTES # BLD AUTO: 0.05 THOUSAND/UL (ref 0–0.2)
IMM GRANULOCYTES NFR BLD AUTO: 0 % (ref 0–2)
KETONES UR STRIP-MCNC: NEGATIVE MG/DL
LEUKOCYTE ESTERASE UR QL STRIP: NEGATIVE
LIPASE SERPL-CCNC: 98 U/L (ref 73–393)
LYMPHOCYTES # BLD AUTO: 2.59 THOUSANDS/ΜL (ref 0.6–4.47)
LYMPHOCYTES NFR BLD AUTO: 19 % (ref 14–44)
MCH RBC QN AUTO: 28.6 PG (ref 26.8–34.3)
MCHC RBC AUTO-ENTMCNC: 32.5 G/DL (ref 31.4–37.4)
MCV RBC AUTO: 88 FL (ref 82–98)
MONOCYTES # BLD AUTO: 0.82 THOUSAND/ΜL (ref 0.17–1.22)
MONOCYTES NFR BLD AUTO: 6 % (ref 4–12)
NEUTROPHILS # BLD AUTO: 9.51 THOUSANDS/ΜL (ref 1.85–7.62)
NEUTS SEG NFR BLD AUTO: 72 % (ref 43–75)
NITRITE UR QL STRIP: NEGATIVE
NON-SQ EPI CELLS URNS QL MICRO: ABNORMAL /HPF
NRBC BLD AUTO-RTO: 0 /100 WBCS
OTHER STN SPEC: ABNORMAL
PH UR STRIP.AUTO: 7 [PH]
PLATELET # BLD AUTO: 253 THOUSANDS/UL (ref 149–390)
PMV BLD AUTO: 11.6 FL (ref 8.9–12.7)
POTASSIUM SERPL-SCNC: 3 MMOL/L (ref 3.5–5.3)
PROT SERPL-MCNC: 7.1 G/DL (ref 6.4–8.2)
PROT UR STRIP-MCNC: ABNORMAL MG/DL
RBC # BLD AUTO: 4.13 MILLION/UL (ref 3.81–5.12)
RBC #/AREA URNS AUTO: ABNORMAL /HPF
SODIUM SERPL-SCNC: 140 MMOL/L (ref 136–145)
SP GR UR STRIP.AUTO: 1.01 (ref 1–1.03)
UROBILINOGEN UR QL STRIP.AUTO: 0.2 E.U./DL
WBC # BLD AUTO: 13.32 THOUSAND/UL (ref 4.31–10.16)
WBC #/AREA URNS AUTO: ABNORMAL /HPF

## 2022-07-13 PROCEDURE — 96376 TX/PRO/DX INJ SAME DRUG ADON: CPT

## 2022-07-13 PROCEDURE — 81025 URINE PREGNANCY TEST: CPT | Performed by: EMERGENCY MEDICINE

## 2022-07-13 PROCEDURE — G1004 CDSM NDSC: HCPCS

## 2022-07-13 PROCEDURE — 85025 COMPLETE CBC W/AUTO DIFF WBC: CPT | Performed by: EMERGENCY MEDICINE

## 2022-07-13 PROCEDURE — 80053 COMPREHEN METABOLIC PANEL: CPT | Performed by: EMERGENCY MEDICINE

## 2022-07-13 PROCEDURE — 99285 EMERGENCY DEPT VISIT HI MDM: CPT

## 2022-07-13 PROCEDURE — 74176 CT ABD & PELVIS W/O CONTRAST: CPT

## 2022-07-13 PROCEDURE — 81001 URINALYSIS AUTO W/SCOPE: CPT | Performed by: EMERGENCY MEDICINE

## 2022-07-13 PROCEDURE — 83690 ASSAY OF LIPASE: CPT | Performed by: EMERGENCY MEDICINE

## 2022-07-13 PROCEDURE — 99284 EMERGENCY DEPT VISIT MOD MDM: CPT | Performed by: EMERGENCY MEDICINE

## 2022-07-13 PROCEDURE — 96361 HYDRATE IV INFUSION ADD-ON: CPT

## 2022-07-13 PROCEDURE — 96375 TX/PRO/DX INJ NEW DRUG ADDON: CPT

## 2022-07-13 PROCEDURE — 96374 THER/PROPH/DIAG INJ IV PUSH: CPT

## 2022-07-13 PROCEDURE — 36415 COLL VENOUS BLD VENIPUNCTURE: CPT | Performed by: EMERGENCY MEDICINE

## 2022-07-13 RX ORDER — KETOROLAC TROMETHAMINE 30 MG/ML
15 INJECTION, SOLUTION INTRAMUSCULAR; INTRAVENOUS ONCE
Status: COMPLETED | OUTPATIENT
Start: 2022-07-13 | End: 2022-07-13

## 2022-07-13 RX ORDER — ONDANSETRON 2 MG/ML
4 INJECTION INTRAMUSCULAR; INTRAVENOUS ONCE
Status: COMPLETED | OUTPATIENT
Start: 2022-07-13 | End: 2022-07-13

## 2022-07-13 RX ORDER — POTASSIUM CHLORIDE 14.9 MG/ML
20 INJECTION INTRAVENOUS ONCE
Status: DISCONTINUED | OUTPATIENT
Start: 2022-07-13 | End: 2022-07-13

## 2022-07-13 RX ORDER — SODIUM CHLORIDE 9 MG/ML
100 INJECTION, SOLUTION INTRAVENOUS CONTINUOUS
Status: DISCONTINUED | OUTPATIENT
Start: 2022-07-13 | End: 2022-07-14 | Stop reason: HOSPADM

## 2022-07-13 RX ORDER — MAGNESIUM SULFATE HEPTAHYDRATE 40 MG/ML
2 INJECTION, SOLUTION INTRAVENOUS ONCE
Status: DISCONTINUED | OUTPATIENT
Start: 2022-07-13 | End: 2022-07-13

## 2022-07-13 RX ORDER — POTASSIUM CHLORIDE 20 MEQ/1
40 TABLET, EXTENDED RELEASE ORAL ONCE
Status: DISCONTINUED | OUTPATIENT
Start: 2022-07-13 | End: 2022-07-13

## 2022-07-13 RX ORDER — ONDANSETRON 4 MG/1
4 TABLET, ORALLY DISINTEGRATING ORAL EVERY 6 HOURS PRN
Qty: 12 TABLET | Refills: 0 | Status: SHIPPED | OUTPATIENT
Start: 2022-07-13

## 2022-07-13 RX ORDER — DICYCLOMINE HCL 20 MG
20 TABLET ORAL EVERY 6 HOURS PRN
Qty: 12 TABLET | Refills: 0 | Status: SHIPPED | OUTPATIENT
Start: 2022-07-13

## 2022-07-13 RX ORDER — ONDANSETRON 2 MG/ML
1 INJECTION INTRAMUSCULAR; INTRAVENOUS ONCE
Status: COMPLETED | OUTPATIENT
Start: 2022-07-13 | End: 2022-07-13

## 2022-07-13 RX ORDER — DICYCLOMINE HCL 20 MG
20 TABLET ORAL ONCE
Status: COMPLETED | OUTPATIENT
Start: 2022-07-13 | End: 2022-07-13

## 2022-07-13 RX ADMIN — KETOROLAC TROMETHAMINE 15 MG: 30 INJECTION, SOLUTION INTRAMUSCULAR; INTRAVENOUS at 19:20

## 2022-07-13 RX ADMIN — ONDANSETRON 4 MG: 2 INJECTION INTRAMUSCULAR; INTRAVENOUS at 22:01

## 2022-07-13 RX ADMIN — DICYCLOMINE HYDROCHLORIDE 20 MG: 20 TABLET ORAL at 22:01

## 2022-07-13 RX ADMIN — SODIUM CHLORIDE 100 ML/HR: 0.9 INJECTION, SOLUTION INTRAVENOUS at 20:24

## 2022-07-13 RX ADMIN — ONDANSETRON 4 MG: 2 INJECTION INTRAMUSCULAR; INTRAVENOUS at 20:24

## 2022-07-13 NOTE — ED PROVIDER NOTES
History  Chief Complaint   Patient presents with    Abdominal Pain     Pt arrives with abd pain that is crampy  Pain is also in the lower back "near the kidneys"  Reports pain started around 1600, has nausea and diarrhea  Reports hx of IBS but this is not similar to previous IBS attacks  Received zofran in ambulance, reports some relief with that     46year old female presents for evaluation of bilateral flank pain associated with generalized abdominal pain, nausea and diarrhea  Bilateral flank pain is described as throbbing with gradual onset at 4:19 pm today while sitting  No recent heavy lifting, twisting or bending  History of similar in the past, but less severe which she states has been diagnosed as "kidney pain " She then developed diffuse abdominal cramping as well as nausea and diarrhea  Patient reports 4-5 watery stools in the past 2 hours  Patient took pepto bismol for her symptoms 30 minutes ago without improvement  She was given 4 mg zofran by EMS in transit  No episodes of emesis  She states she has chronic dysuria and the feeling she cannot empty her bladder which is unchanged from baseline  Prior laparoscopic ovarian cyst removal and appendectomy  History provided by:  Patient  Abdominal Pain  Pain location:  L flank and R flank  Pain quality: cramping and throbbing    Pain radiates to:  Does not radiate  Pain severity:  Severe  Onset quality:  Gradual  Duration:  3 hours  Timing:  Constant  Progression:  Worsening  Chronicity:  New  Relieved by:  Nothing  Worsened by:  Nothing  Ineffective treatments: pepto bismol  Associated symptoms: diarrhea, dysuria and nausea    Associated symptoms: no chest pain, no chills, no constipation, no cough, no fever, no shortness of breath, no sore throat and no vomiting    Risk factors: multiple surgeries        Prior to Admission Medications   Prescriptions Last Dose Informant Patient Reported? Taking?    albuterol (PROVENTIL HFA,VENTOLIN HFA) 90 mcg/act inhaler Past Month at Unknown time  No Yes   Sig: Inhale 1 puff every 4 (four) hours as needed for wheezing   escitalopram (LEXAPRO) 10 mg tablet 7/13/2022 at Unknown time  No Yes   Sig: Take 1 tablet (10 mg total) by mouth daily Take 1/2 pill for first 7 days then take one pill daily   ipratropium-albuterol (DUO-NEB) 0 5-2 5 mg/3 mL nebulizer solution Past Month at Unknown time  No Yes   Sig: Take 1 vial (3 mL total) by nebulization 4 (four) times a day   montelukast (SINGULAIR) 10 mg tablet 7/13/2022 at Unknown time  No Yes   Sig: TAKE 1 TABLET (10 MG TOTAL) BY MOUTH DAILY AT BEDTIME   omeprazole (PriLOSEC) 40 MG capsule Past Month at Unknown time  No Yes   Sig: TAKE 1 CAPSULE (40 MG TOTAL) BY MOUTH DAILY   valACYclovir (VALTREX) 500 mg tablet 7/13/2022 at Unknown time Self No Yes   Sig: Take 1 tablet (500 mg total) by mouth daily for 90 doses   Patient taking differently: Take 2,000 mg by mouth daily      Facility-Administered Medications: None       Past Medical History:   Diagnosis Date    Anemia     Anxiety     Arthritis     Asthma     Coronary artery disease     Disease of thyroid gland     GERD (gastroesophageal reflux disease)     History of heroin use     herion last used 3 5 years     Hypertension     Hypothyroidism     IBS (irritable bowel syndrome)     Infectious viral hepatitis     Mitral valve regurgitation     Myocardial infarction (HCC)     Pneumonia     RA (rheumatoid arthritis) (HCC)     Tobacco abuse     Urinary tract infection        Past Surgical History:   Procedure Laterality Date    APPENDECTOMY      OVARIAN CYST REMOVAL         Family History   Problem Relation Age of Onset    COPD Mother     No Known Problems Father     Asthma Brother     No Known Problems Brother      I have reviewed and agree with the history as documented      E-Cigarette/Vaping    E-Cigarette Use Former User      E-Cigarette/Vaping Substances    Nicotine Yes     THC No     CBD No      Social History     Tobacco Use    Smoking status: Current Every Day Smoker     Packs/day: 0 50     Years: 25 00     Pack years: 12 50     Types: Cigarettes     Start date: 1995    Smokeless tobacco: Never Used   Vaping Use    Vaping Use: Former    Substances: Nicotine   Substance Use Topics    Alcohol use: No    Drug use: Not Currently     Comment: gel for arthrisis       Review of Systems   Constitutional: Negative for appetite change, chills and fever  HENT: Negative for congestion and sore throat  Respiratory: Negative for cough, chest tightness and shortness of breath  Cardiovascular: Negative for chest pain and leg swelling  Gastrointestinal: Positive for abdominal pain, diarrhea and nausea  Negative for constipation and vomiting  Genitourinary: Positive for dysuria  Musculoskeletal: Negative for myalgias  Skin: Negative for rash and wound  Neurological: Negative for dizziness, syncope and headaches  All other systems reviewed and are negative  Physical Exam  Physical Exam  Vitals and nursing note reviewed  Constitutional:       General: She is not in acute distress  Appearance: She is well-developed  She is not toxic-appearing or diaphoretic  HENT:      Head: Normocephalic and atraumatic  Right Ear: External ear normal       Left Ear: External ear normal       Nose: Nose normal    Eyes:      General: No scleral icterus  Cardiovascular:      Rate and Rhythm: Normal rate and regular rhythm  Pulses: Normal pulses  Pulmonary:      Effort: Pulmonary effort is normal  No respiratory distress  Abdominal:      General: There is no distension  Palpations: Abdomen is soft  Tenderness: There is generalized abdominal tenderness  There is right CVA tenderness and left CVA tenderness  There is no guarding or rebound  Musculoskeletal:         General: No deformity  Normal range of motion  Skin:     General: Skin is warm and dry  Findings: No rash  Neurological:      General: No focal deficit present  Mental Status: She is alert        Gait: Gait normal    Psychiatric:         Mood and Affect: Mood normal          Vital Signs  ED Triage Vitals   Temperature Pulse Respirations Blood Pressure SpO2   07/13/22 1859 07/13/22 1859 07/13/22 1859 07/13/22 1859 07/13/22 1859   98 1 °F (36 7 °C) 73 18 133/91 99 %      Temp Source Heart Rate Source Patient Position - Orthostatic VS BP Location FiO2 (%)   07/13/22 1859 07/13/22 1859 07/13/22 1930 07/13/22 1859 --   Temporal Monitor Sitting Right arm       Pain Score       07/13/22 1859       10 - Worst Possible Pain           Vitals:    07/13/22 1859 07/13/22 1930 07/13/22 2000 07/13/22 2115   BP: 133/91 135/84 130/90 136/91   Pulse: 73 71 62 96   Patient Position - Orthostatic VS:  Sitting Sitting Sitting         Visual Acuity      ED Medications  Medications   sodium chloride 0 9 % infusion (100 mL/hr Intravenous New Bag 7/13/22 2024)   ondansetron (FOR EMS ONLY) (ZOFRAN) 4 mg/2 mL injection 4 mg (0 mg Does not apply Given to EMS 7/13/22 1923)   ketorolac (TORADOL) injection 15 mg (15 mg Intravenous Given 7/13/22 1920)   ondansetron (ZOFRAN) injection 4 mg (4 mg Intravenous Given 7/13/22 2024)   barium (READI-CAT 2) suspension 900 mL (225 mL Oral Given 7/13/22 2105)   ondansetron (ZOFRAN) injection 4 mg (4 mg Intravenous Given 7/13/22 2201)   dicyclomine (BENTYL) tablet 20 mg (20 mg Oral Given 7/13/22 2201)       Diagnostic Studies  Results Reviewed     Procedure Component Value Units Date/Time    Urine Microscopic [[id number]]  (Abnormal) Collected: 07/13/22 1918    Lab Status: Final result Specimen: Urine, Clean Catch Updated: 07/13/22 2011     RBC, UA 10-20 /hpf      WBC, UA 1-2 /hpf      Epithelial Cells Occasional /hpf      Bacteria, UA Occasional /hpf      OTHER OBSERVATIONS Renal Epithelial Cells Present    Comprehensive metabolic panel [[id number]]  (Abnormal) Collected: 07/13/22 1917    Lab Status: Final result Specimen: Blood from Arm, Right Updated: 07/13/22 2002     Sodium 140 mmol/L      Potassium 3 0 mmol/L      Chloride 106 mmol/L      CO2 23 mmol/L      ANION GAP 11 mmol/L      BUN 14 mg/dL      Creatinine 0 92 mg/dL      Glucose 101 mg/dL      Calcium 8 6 mg/dL      AST 18 U/L      ALT 17 U/L      Alkaline Phosphatase 45 U/L      Total Protein 7 1 g/dL      Albumin 4 1 g/dL      Total Bilirubin 0 30 mg/dL      eGFR 78 ml/min/1 73sq m     Narrative:      [place] guidelines for Chronic Kidney Disease (CKD):     Stage 1 with normal or high GFR (GFR > 90 mL/min/1 73 square meters)    Stage 2 Mild CKD (GFR = 60-89 mL/min/1 73 square meters)    Stage 3A Moderate CKD (GFR = 45-59 mL/min/1 73 square meters)    Stage 3B Moderate CKD (GFR = 30-44 mL/min/1 73 square meters)    Stage 4 Severe CKD (GFR = 15-29 mL/min/1 73 square meters)    Stage 5 End Stage CKD (GFR <15 mL/min/1 73 square meters)  Note: GFR calculation is accurate only with a steady state creatinine    Lipase [[id number]]  (Normal) Collected: 07/13/22 1917    Lab Status: Final result Specimen: Blood from Arm, Right Updated: 07/13/22 2002     Lipase 98 u/L     UA w Reflex to Microscopic w Reflex to Culture [[id number]]  (Abnormal) Collected: 07/13/22 1918    Lab Status: Final result Specimen: Urine, Clean Catch Updated: 07/13/22 1939     Color, UA Yellow     Clarity, UA Clear     Specific Gravity, UA 1 010     pH, UA 7 0     Leukocytes, UA Negative     Nitrite, UA Negative     Protein, UA 30 (1+) mg/dl      Glucose, UA Negative mg/dl      Ketones, UA Negative mg/dl      Urobilinogen, UA 0 2 E U /dl      Bilirubin, UA Negative     Occult Blood, UA Moderate    CBC and differential [[id number]]  (Abnormal) Collected: 07/13/22 1917    Lab Status: Final result Specimen: Blood from Arm, Right Updated: 07/13/22 1937     WBC 13 32 Thousand/uL      RBC 4 13 Million/uL      Hemoglobin 11 8 g/dL      Hematocrit 36 3 %      MCV 88 fL MCH 28 6 pg      MCHC 32 5 g/dL      RDW 14 8 %      MPV 11 6 fL      Platelets 304 Thousands/uL      nRBC 0 /100 WBCs      Neutrophils Relative 72 %      Immat GRANS % 0 %      Lymphocytes Relative 19 %      Monocytes Relative 6 %      Eosinophils Relative 2 %      Basophils Relative 1 %      Neutrophils Absolute 9 51 Thousands/µL      Immature Grans Absolute 0 05 Thousand/uL      Lymphocytes Absolute 2 59 Thousands/µL      Monocytes Absolute 0 82 Thousand/µL      Eosinophils Absolute 0 27 Thousand/µL      Basophils Absolute 0 08 Thousands/µL     POCT pregnancy, urine [[id number]]  (Normal) Resulted: 07/13/22 1920    Lab Status: Final result Updated: 07/13/22 1920     EXT PREG TEST UR (Ref: Negative) negative     Control valid                 CT abdomen pelvis wo contrast   Final Result by Kenrick Beverly MD (07/13 2148)      Thickening of the small bowel particularly in the left abdomen likely representing an enteritis  Workstation performed: [id number]                    Procedures  Procedures         ED Course  ED Course as of 07/13/22 2205 Wed Jul 13, 2022 2035 Patient states she has chronic hypokalemia and does not want treatment  Patient advised that not treating her low potassium could result in arrhythmia and sudden cardiac death  Patient states that she understands this risk and is able to repeat back understanding  SBIRT 20yo+    Flowsheet Row Most Recent Value   SBIRT (25 yo +)    In order to provide better care to our patients, we are screening all of our patients for alcohol and drug use  Would it be okay to ask you these screening questions? Yes Filed at: 07/13/2022 1937   Initial Alcohol Screen: US AUDIT-C     1  How often do you have a drink containing alcohol? 0 Filed at: 07/13/2022 1937   2  How many drinks containing alcohol do you have on a typical day you are drinking? 0 Filed at: 07/13/2022 1937   3b  FEMALE Any Age, or MALE 65+:  How often do you have 4 or more drinks on one occassion? 0 Filed at: 07/13/2022 1937   Audit-C Score 0 Filed at: 07/13/2022 1937   TERRY: How many times in the past year have you    Used an illegal drug or used a prescription medication for non-medical reasons? Never Filed at: 07/13/2022 1937                    [facility]  Number of Diagnoses or Management Options  Abdominal pain: new and requires workup  Enteritis: new and requires workup  Hematuria: new and requires workup  Hypokalemia: new and requires workup  Nausea and vomiting: new and requires workup  Diagnosis management comments: 46year old female presents for evaluation of abdominal pain, nausea and diarrhea  Labs with hypokalemia  Hematuria on UA  Patient states she is not currently on her period  Patient refused potassium replacement despite risk of cardiac arrhythmia and death  CT with signs of enteritis  No renal stones  Symptomatic management  PCP follow up  Return precautions provided  Amount and/or Complexity of Data Reviewed  Clinical lab tests: ordered and reviewed  Tests in the radiology section of CPT®: ordered and reviewed    Patient Progress  Patient progress: stable      Disposition  Final diagnoses:   Abdominal pain   Hypokalemia   Enteritis   Nausea and vomiting   Hematuria     Time reflects when diagnosis was documented in both MDM as applicable and the Disposition within this note     Time User Action Codes Description Comment    7/13/2022  8:03 PM Jerry [place] J Add [R10 9] Abdominal pain     7/13/2022  8:03 PM [place] J Add [E87 6] Hypokalemia     7/13/2022  9:53 PM Aldora Lo Add [K52 9] Enteritis     7/13/2022  9:57 PM Jerry [place] J Add [R11 2] Nausea and vomiting     7/13/2022 10:04 PM Aldora Lo Add [R31 9] Hematuria       ED Disposition     ED Disposition   Discharge    Condition   Stable    Date/Time   Wed Jul 13, 2022  9:53 PM    Comment   Griffin Bailey discharge to home/self care                 Follow-up Information     Follow up With Specialties Details Why Contact Info Additional Information    [place], [address], Nurse Practitioner Schedule an appointment as soon as possible for a visit in 2 days for re-evaluation [address]         [address] Emergency Department Emergency Medicine Go to  If symptoms worsen [address] [place],[phone]  [address] [facility] Emergency Department, [address] [facility], [facility] [address]          Patient's Medications   Discharge Prescriptions    DICYCLOMINE (BENTYL) 20 MG TABLET    Take 1 tablet (20 mg total) by mouth every 6 (six) hours as needed (abdominal cramping)       Start Date: 7/13/2022 End Date: --       Order Dose: 20 mg       Quantity: 12 tablet    Refills: 0    ONDANSETRON (ZOFRAN ODT) 4 MG DISINTEGRATING TABLET    Take 1 tablet (4 mg total) by mouth every 6 (six) hours as needed for nausea or vomiting       Start Date: 7/13/2022 End Date: --       Order Dose: 4 mg       Quantity: 12 tablet    Refills: 0       No discharge procedures on file      PDMP Review       Value Time User    PDMP Reviewed  Yes 2/24/2021 12:14 PM [facility]          ED Provider  Electronically Signed by           Kayla Mckeon MD  07/13/22 6342

## 2022-07-14 NOTE — DISCHARGE INSTRUCTIONS
Since you refused potassium replacement while in the ED, please increase your intake of potassium rich foods and follow up with your primary care doctor